# Patient Record
Sex: MALE | Race: WHITE | NOT HISPANIC OR LATINO | Employment: FULL TIME | URBAN - METROPOLITAN AREA
[De-identification: names, ages, dates, MRNs, and addresses within clinical notes are randomized per-mention and may not be internally consistent; named-entity substitution may affect disease eponyms.]

---

## 2017-01-13 ENCOUNTER — OFFICE VISIT (OUTPATIENT)
Dept: ENDOCRINOLOGY | Facility: CLINIC | Age: 37
End: 2017-01-13

## 2017-01-13 VITALS
WEIGHT: 145 LBS | HEART RATE: 63 BPM | SYSTOLIC BLOOD PRESSURE: 132 MMHG | HEIGHT: 70 IN | BODY MASS INDEX: 20.76 KG/M2 | DIASTOLIC BLOOD PRESSURE: 80 MMHG

## 2017-01-13 DIAGNOSIS — Z23 NEED FOR PROPHYLACTIC VACCINATION AND INOCULATION AGAINST INFLUENZA: ICD-10-CM

## 2017-01-13 DIAGNOSIS — E10.9 CONTROLLED DIABETES MELLITUS TYPE 1 WITHOUT COMPLICATIONS (H): Primary | ICD-10-CM

## 2017-01-13 DIAGNOSIS — E10.9 CONTROLLED DIABETES MELLITUS TYPE 1 WITHOUT COMPLICATIONS (H): ICD-10-CM

## 2017-01-13 LAB
CHOLEST SERPL-MCNC: 135 MG/DL
CREAT SERPL-MCNC: 0.64 MG/DL (ref 0.66–1.25)
CREAT UR-MCNC: 23 MG/DL
GFR SERPL CREATININE-BSD FRML MDRD: ABNORMAL ML/MIN/1.7M2
HBA1C MFR BLD: 6.2 % (ref 4.3–6)
HDLC SERPL-MCNC: 64 MG/DL
LDLC SERPL CALC-MCNC: 63 MG/DL
MICROALBUMIN UR-MCNC: <5 MG/L
MICROALBUMIN/CREAT UR: NORMAL MG/G CR (ref 0–17)
NONHDLC SERPL-MCNC: 70 MG/DL
POTASSIUM SERPL-SCNC: 4.2 MMOL/L (ref 3.4–5.3)
TRIGL SERPL-MCNC: 38 MG/DL
TSH SERPL DL<=0.05 MIU/L-ACNC: 0.64 MU/L (ref 0.4–4)

## 2017-01-13 RX ORDER — LISINOPRIL 10 MG/1
10 TABLET ORAL DAILY
Qty: 90 TABLET | Refills: 3 | Status: SHIPPED | OUTPATIENT
Start: 2017-01-13 | End: 2018-01-19

## 2017-01-13 ASSESSMENT — PAIN SCALES - GENERAL: PAINLEVEL: NO PAIN (0)

## 2017-01-13 NOTE — PROGRESS NOTES
Quick Note:    The results which are within normal limits. Will continue with plan as discussed. Pt informed.    Bushra Rosa MD  1/13/2017  5:46 PM    ______

## 2017-01-13 NOTE — Clinical Note
Patient:  Joseph O Thoen  :   1980  MRN:     [id number]        Mr.Joseph O Thoen  [address] [facility] [address]        2017    Dear Yung    Here is your urine which is negative for protein. This is great news.    If you have any questions, please feel free to contact my nurse at [phone] select option #3 for triage nurse  or  option #1 for scheduling related questions.    Regards    Bushra Rosa MD        Resulted Orders   Albumin Random Urine Quantitative   Result Value Ref Range    Creatinine Urine 23 mg/dL    Albumin Urine mg/L <5 mg/L    Albumin Urine mg/g Cr Unable to calculate due to low value 0 - 17 mg/g Cr   Hemoglobin A1c POCT   Result Value Ref Range    Hemoglobin A1C 6.2 (A) 4.3 - 6.0 %       Bushra Rosa MD

## 2017-01-13 NOTE — PROGRESS NOTES
Injectable Influenza Immunization Documentation    1.  Is the person to be vaccinated sick today?  No    2. Does the person to be vaccinated have an allergy to eggs or to a component of the vaccine?  No    3. Has the person to be vaccinated today ever had a serious reaction to influenza vaccine in the past?  No    4. Has the person to be vaccinated ever had Guillain-Parker syndrome?  No     Form completed by shelly hogue

## 2017-01-13 NOTE — Clinical Note
Patient:  Joseph O Thoen  :   1980  MRN:     [id number]        Mr.Joseph O Thoen  [address] [facility] [address]        2017    Dear Jose    Here are your results which are within normal limits.  Let's continue with the plan as discussed.    If you have any questions, please feel free to contact my nurse at [phone] select option #3 for triage nurse  or  option #1 for scheduling related questions.    Regards    Bushra Rosa MD      Resulted Orders   Creatinine   Result Value Ref Range    Creatinine 0.64 (L) 0.66 - 1.25 mg/dL    GFR Estimate >90  Non  GFR Calc   >60 mL/min/1.7m2    GFR Estimate If Black >90   GFR Calc   >60 mL/min/1.7m2   Lipid Profile   Result Value Ref Range    Cholesterol 135 <200 mg/dL    Triglycerides 38 <150 mg/dL    HDL Cholesterol 64 >39 mg/dL    LDL Cholesterol Calculated 63 <100 mg/dL      Comment:      Desirable:       <100 mg/dl    Non HDL Cholesterol 70 <130 mg/dL   TSH   Result Value Ref Range    TSH 0.64 0.40 - 4.00 mU/L   Tissue transglutaminase rylee IgA and IgG   Result Value Ref Range    Tissue Transglutaminase Antibody IgA 1 <7 U/mL      Comment:      Negative    Tissue Transglutaminase Rylee IgG <1  Negative   <7 U/mL   Potassium   Result Value Ref Range    Potassium 4.2 3.4 - 5.3 mmol/L       Lab

## 2017-01-13 NOTE — NURSING NOTE
Chief Complaint   Patient presents with     RECHECK     F/U TYPE I DM     Gisele Pablo, [facility]  Endocrinology & Diabetes 3G

## 2017-01-13 NOTE — Clinical Note
1/13/2017       RE: Joseph O Thoen  [address] [facility] [address]     Dear Colleague,    Thank you for referring your patient, Joseph O Thoen, to the [facility] ENDOCRINOLOGY at [facility]. Please see a copy of my visit note below.    Endocrinology Progress Note  CC: Diabetes care    HPI:   #1 Diabetes - likely Type 1  The patient was initially seen in May of 2011. He had a 30 pound weight loss with polyuria, polydipsia, and serum glucose of 466. His May 2011 hemoglobin A1c was 13.5. His anion gap of 17. His C-peptide was 0.8 with a glucose of 318. His TSH was normal. His urine was negative for microalbumin. His DEMARCO antibodies were negative. He was initially tried on metformin but if his sugars do not improve over the course of 4 days, he was then started on Lantus and Novolog. His evaluation in September of 2011 was significant for negative anti-islet and anti-insulin antibodies. His August 2011 hemoglobin A1c was 5.8.September 2011 hemoglobin A1c of 5.2. March 2012 hemoglobin A1c of 4.9. In July 2012, the patient started a Medtronic Paradigm pump. The patient is generally very satisfied with his pump. August 2013 evaluation showed low C-peptide at 0.2, concurrent glucose of 80, and negative DEMARCO Antibodies. January 2014 hemoglobin A1c of 6.1. July 2014 hemoglobin A1C is 6.2. In 2014, the patient added CGMS to his pump. January 2015 hemoglobin A1c of 5.9. The patient has met with diabetes education and has adjusted his CGMS.  July 2015 hemoglobin A1c of 6.2.    Interval history since last visit:   Patient reports doing well.  January 2017 hemoglobin A1c of 6.2 reviewing his CGM S, he tends to have hyperglycemia post breakfast as well as post supper (in the 160s).  His blood sugars appear to be fairly stable overnight.  He reports tolerating his insulin pump and sensor.      Pump program:  Basal rate:  Midnight to 10 AM at 0.55 units per hour  10 AM to noon at 0.575  "units per hour  Noon to 2 PM at 0.55 units per hour  2 PM to 6 PM at 0.575 units per hour  6 PM to midnight at 0.6 units per hour    Bolus:  9 PM to 6 AM at one unit per 20 g carbohydrate, 6 AM to 9 PM at one unit per 15 g carbohydrate.  Insulin sensitivity: 9:30 PM to 6 AM at one unit per 60 mg/dL above goal  Active insulin time 4 hours    ROS related to diabetes   CV: none   Neuro:none   Neph: Neg in July 2015, patient on lisinopril 10 mg daily  Eye: eye exam negative in January 2016  Infections: patient denies   Dental: pending   Immunizations: pneumonia shot in 2011.  Flu shot in 2017  Lantus prescription given in case of pump failure in July 2015  Glucagon prescription given in January 2016    ROS: As stated in HPI, otherwise negative.    PMH: No history of hospitalizations, surgeries.     Medications:   Current Outpatient Prescriptions   Medication Sig Dispense Refill     insulin glargine (LANTUS) 100 UNIT/ML injection Inject 13 Units Subcutaneous At Bedtime To provide basal insulin If pump fails 3 mL 0     insulin aspart (NOVOLOG VIAL) 100 UNITS/ML injection Pt uses about 40 units per day in the pump 40 mL 3     lisinopril (PRINIVIL/ZESTRIL) 10 MG tablet Take 1 tablet (10 mg) by mouth daily 90 tablet 3     blood glucose (STAS CONTOUR) test strip Patient is transitioning to insulin pump.  Needs these test strips because this is the only meter that works with his pump.  Will need to test 6 times daily 200 strip prn     MICROLET LANCETS MISC 1 each 6 times daily 200 each 11     acetone, Urine, test STRP 1 each by In Vitro route as needed. 50 strip 3     insulin syringe 31G X 5/16\" 0.3 ML MISC 1 each as needed. 100 each 3     Insulin Pen Needle (BD PEN NEEDLE KOKI U/F) 32G X 4 MM MISC Use 4  daily or as directed. 200 each 3     Blood Pressure Monitor KIT Take bp 2 times a week or PRN problems.  Dx DM 1 kit 0     Insulin Pen Needle (PEN NEEDLES) 31G X 8 MM MISC Use as directed       [DISCONTINUED] lisinopril " "(PRINIVIL/ZESTRIL) 10 MG tablet Take 1 tablet (10 mg) by mouth daily 90 tablet 2     [DISCONTINUED] insulin aspart (NOVOLOG VIAL) 100 UNITS/ML VIAL Pt uses about 30 units per day in the pump 30 mL 3     [DISCONTINUED] insulin glargine (LANTUS) 100 UNIT/ML PEN Inject 13 Units Subcutaneous At Bedtime To provide basal insulin If pump fails 3.9 mL 0       Family History:   Thyroid disease, negative for diabetes    Physical Exam:  Vital signs:   /80 mmHg  Pulse 63  Estimated body mass index is 21.09 kg/(m^2) as calculated from the following:    Height as of 8/8/16: 1.778 m (5' 10\").    Weight as of 8/8/16: 66.679 kg (147 lb).  GENERAL APPEARANCE: Alert and no distress  NECK: No lymphadenopathy appreciated  Thyroid: No obvious nodules palpated   CV: RRR without M/R/G  Lungs: CTA bilaterally  Abdomen: Soft, Nontender, non distended, positive bowel sounds   Skin: No infection in feet , intact peripheral pulses  Mood: Normal   Lymph: neg in neck and supraclavicular area    No visits with results within 1 Week(s) from this visit.  Latest known visit with results is:    Orders Only on 01/13/2017   Component Date Value Ref Range Status     Creatinine 01/13/2017 0.64* 0.66 - 1.25 mg/dL Final     GFR Estimate 01/13/2017   >60 mL/min/1.7m2 Final                    Value:>90  Non  GFR Calc       GFR Estimate If Black 01/13/2017   >60 mL/min/1.7m2 Final                    Value:>90   GFR Calc       Cholesterol 01/13/2017 135  <200 mg/dL Final     Triglycerides 01/13/2017 38  <150 mg/dL Final     HDL Cholesterol 01/13/2017 64  >39 mg/dL Final     LDL Cholesterol Calculated 01/13/2017 63  <100 mg/dL Final    Desirable:       <100 mg/dl     Non HDL Cholesterol 01/13/2017 70  <130 mg/dL Final     TSH 01/13/2017 0.64  0.40 - 4.00 mU/L Final     Potassium 01/13/2017 4.2  3.4 - 5.3 mmol/L Final   Office Visit on 01/13/2017   Component Date Value Ref Range Status     Creatinine Urine 01/13/2017 23   " Final     Albumin Urine mg/L 01/13/2017 <5   Final     Albumin Urine mg/g Cr 01/13/2017 Unable to calculate due to low value  0 - 17 mg/g Cr Final     Hemoglobin A1C 01/13/2017 6.2* 4.3 - 6.0 % Final           Assessment/Plan    1) Diabetes Mellitus -likely type 1  Patient continues to do well in terms of glycemic control given his recent hemoglobin A1c 1.6.I am uncertain whether the patient has type I or type 2 diabetes, but the evidence for type 1 diabetes include low BMI of 21, and a low C-peptide of 0.8 with glucose of 318 and hx of previous anion gap 17. His C-peptide was low at 0.2 (concurrent glucose of 80) and his DEMARCO antibodies continue to remain negative as of August 2013.    The patient continues with this Medtronic pump with sensor.  Given his hyperglycemia post breakfast and after supper, I will increase his prandial coverage with breakfast and supper (new program at one unit per 12 g carbohydrate) and slightly increase his prandial coverage with lunch (one unit per 14 g carbohydrate)    Basal rate:  11 PM to 10 AM at 0.55 units per hour  10 AM to noon at 0.575 units per hour  Noon to 2 PM at 0.55 units per hour  2 PM to 6 PM at 0.575 units per hour  6 PM to 11 PM at 0.625 units per hour    Bolus:  9 PM total 6 AM at one unit per 20 g carbohydrate  6 AM to 11 AM at one unit per 12 g carbohydrate  11 AM to 5:30 PM at one unit per 14 g carbohydrate  5:30 PM to 9 PM at one unit per 12 g carbohydrate    1 unit per 50 above goal of 100 during the day, and 1 unit per 60 above goal 120 from 9 PM to 6 AM    Prescription given for Lantus in case of pump failure.  Referral made for ophthalmology.  Flu shot given today.  Refills for NovoLog and lisinopril sent to pharmacy.    Patient interest in the possibility of the closed Loop Medtronic pump when it becomes available.  We will talk at return visit.    #2 history of hypertension  July 2015 potassium and creatinine normal.  Blood pressure today was good.  Patient  continues on lisinopril.  Will check potassium and creatinine today    25 minutes spent with patient of which 20 minutes was spent in face-to-face discussion coordination of care    Return visit planned in 6 months, sooner if needed.        Injectable Influenza Immunization Documentation    1.  Is the person to be vaccinated sick today?  No    2. Does the person to be vaccinated have an allergy to eggs or to a component of the vaccine?  No    3. Has the person to be vaccinated today ever had a serious reaction to influenza vaccine in the past?  No    4. Has the person to be vaccinated ever had Guillain-Walters syndrome?  No     Form completed by shelly hogue        Again, thank you for allowing me to participate in the care of your patient.      Sincerely,    Bushra Rosa MD

## 2017-01-13 NOTE — MR AVS SNAPSHOT
After Visit Summary   1/13/2017    Joseph O Thoen    MRN: [id number]           Patient Information     Date Of Birth          1980        Visit Information        Provider Department      1/13/2017 12:00 PM Bushra Rosa MD  [facility]        Today's Diagnoses     Controlled diabetes mellitus type 1 without complications (H)    -  1       Care Instructions    Read about medtronic closed loop pump - approved by FDA - not yet out - promising        Follow-ups after your visit        Additional Services     OPHTHALMOLOGY ADULT REFERRAL       Pt needs eye exam given hx of diabetes                  Follow-up notes from your care team     Return in about 6 months (around 7/13/2017).      Your next 10 appointments already scheduled     Jan 13, 2017  1:30 PM   LAB with  LAB   [facility]    [address] [place] [address]  [address] [facility] [address] [phone]           Patient must bring picture ID.  Patient should be prepared to give a urine specimen  Please do not eat 10-12 hours before your appointment if you are coming in fasting for labs on lipids, cholesterol, or glucose (sugar).  Pregnant women should follow their Care Team instructions. Water with medications is okay. Do not drink coffee or other fluids.   If you have concerns about taking  your medications, please ask at office or if scheduling via [employer], send a message by clicking on Secure Messaging, Message Your Care Team.            Jan 17, 2017  9:00 AM   (Arrive by 8:45 AM)   NEW GENERAL with Brodie Cheek OD   [facility]    [address] [place] [address]  4th Floor  [facility] [address] [phone]            Jul 14, 2017  8:30 AM   (Arrive by 8:15 AM)   RETURN DIABETES with Bushra Rosa MD   [facility]    [address] [place] [address]  3rd Floor  [facility] [address]  "  [phone]              Future tests that were ordered for you today     Open Future Orders        Priority Expected Expires Ordered    Creatinine Routine  1/13/2018 1/13/2017    Lipid Profile Routine  1/13/2018 1/13/2017    TSH Routine  1/13/2018 1/13/2017    Tissue transglutaminase rylee IgA and IgG Routine  1/13/2018 1/13/2017    Potassium Routine  1/13/2018 1/13/2017            Who to contact     Please call your clinic at [phone] to:    Ask questions about your health    Make or cancel appointments    Discuss your medicines    Learn about your test results    Speak to your doctor   If you have compliments or concerns about an experience at your clinic, or if you wish to file a complaint, please contact [facility] Patient Relations at [phone] or email us at [email or web address]         Additional Information About Your Visit        [employer] Information     [employer] gives you secure access to your electronic health record. If you see a primary care provider, you can also send messages to your care team and make appointments. If you have questions, please call your primary care clinic.  If you do not have a primary care provider, please call [phone] and they will assist you.      [employer] is an electronic gateway that provides easy, online access to your medical records. With [employer], you can request a clinic appointment, read your test results, renew a prescription or communicate with your care team.     To access your existing account, please contact your [facility] or call [phone] for assistance.        Care EveryWhere ID     This is your Care EveryWhere ID. This could be used by other organizations to access your [facility] medical records  [id number]        Your Vitals Were     Pulse Height BMI (Body Mass Index)             63 1.778 m (5' 10\") 20.81 kg/m2          Blood Pressure from Last 3 Encounters:   01/13/17 132/80 "   08/08/16 120/74   01/29/16 130/81    Weight from Last 3 Encounters:   01/13/17 65.772 kg (145 lb)   08/08/16 66.679 kg (147 lb)   01/29/16 66.225 kg (146 lb)              We Performed the Following     Albumin Random Urine Quantitative     OPHTHALMOLOGY ADULT REFERRAL          Today's Medication Changes          These changes are accurate as of: 1/13/17  1:16 PM.  If you have any questions, ask your nurse or doctor.               These medicines have changed or have updated prescriptions.        Dose/Directions    insulin aspart 100 UNITS/ML injection   Commonly known as:  NovoLOG VIAL   This may have changed:  additional instructions   Used for:  Controlled diabetes mellitus type 1 without complications (H)   Changed by:  Bushra Rosa MD        Pt uses about 40 units per day in the pump   Quantity:  40 mL   Refills:  3            Where to get your medicines      These medications were sent to [place], [place] - [address] [place] [address] [address] [address]    Hours:  TRANSPLANT PHONE NUMBER [phone] Phone:  [phone]    - insulin aspart 100 UNITS/ML injection  - lisinopril 10 MG tablet      Some of these will need a paper prescription and others can be bought over the counter.  Ask your nurse if you have questions.     Bring a paper prescription for each of these medications    - insulin glargine 100 UNIT/ML injection             Primary Care Provider    Doctor Unknown, MD       No address on file        Thank you!     Thank you for choosing [facility] ENDOCRINOLOGY  for your care. Our goal is always to provide you with excellent care. Hearing back from our patients is one way we can continue to improve our services. Please take a few minutes to complete the written survey that you may receive in the mail after your visit with us. Thank you!             Your Updated Medication List - Protect others around you: Learn how to safely use,  "store and throw away your medicines at www.disposemymeds.org.          This list is accurate as of: 1/13/17  1:16 PM.  Always use your most recent med list.                   Brand Name Dispense Instructions for use    acetone (Urine) test Strp     50 strip    1 each by In Vitro route as needed.       blood glucose monitoring test strip    STAS CONTOUR    200 strip    Patient is transitioning to insulin pump.  Needs these test strips because this is the only meter that works with his pump.  Will need to test 6 times daily       Blood Pressure Monitor Kit     1 kit    Take bp 2 times a week or PRN problems.  Dx DM       insulin aspart 100 UNITS/ML injection    NovoLOG VIAL    40 mL    Pt uses about 40 units per day in the pump       insulin glargine 100 UNIT/ML injection    LANTUS    3 mL    Inject 13 Units Subcutaneous At Bedtime To provide basal insulin If pump fails       insulin syringe 31G X 5/16\" 0.3 ML Misc     100 each    1 each as needed.       lisinopril 10 MG tablet    PRINIVIL/ZESTRIL    90 tablet    Take 1 tablet (10 mg) by mouth daily       MICROLET LANCETS Misc     200 each    1 each 6 times daily       * BD KOKI U/F 32G X 4 MM   Generic drug:  insulin pen needle     200 each    Use 4  daily or as directed.       * Pen Needles 31G X 8 MM Misc      Use as directed       * Notice:  This list has 2 medication(s) that are the same as other medications prescribed for you. Read the directions carefully, and ask your doctor or other care provider to review them with you.      "

## 2017-01-13 NOTE — PROGRESS NOTES
Endocrinology Progress Note  CC: Diabetes care    HPI:   #1 Diabetes - likely Type 1  The patient was initially seen in May of 2011. He had a 30 pound weight loss with polyuria, polydipsia, and serum glucose of 466. His May 2011 hemoglobin A1c was 13.5. His anion gap of 17. His C-peptide was 0.8 with a glucose of 318. His TSH was normal. His urine was negative for microalbumin. His DEMARCO antibodies were negative. He was initially tried on metformin but if his sugars do not improve over the course of 4 days, he was then started on Lantus and Novolog. His evaluation in September of 2011 was significant for negative anti-islet and anti-insulin antibodies. His August 2011 hemoglobin A1c was 5.8.September 2011 hemoglobin A1c of 5.2. March 2012 hemoglobin A1c of 4.9. In July 2012, the patient started a Medtronic Paradigm pump. The patient is generally very satisfied with his pump. August 2013 evaluation showed low C-peptide at 0.2, concurrent glucose of 80, and negative DEMARCO Antibodies. January 2014 hemoglobin A1c of 6.1. July 2014 hemoglobin A1C is 6.2. In 2014, the patient added CGMS to his pump. January 2015 hemoglobin A1c of 5.9. The patient has met with diabetes education and has adjusted his CGMS.  July 2015 hemoglobin A1c of 6.2.    Interval history since last visit:   Patient reports doing well.  January 2017 hemoglobin A1c of 6.2 reviewing his CGM S, he tends to have hyperglycemia post breakfast as well as post supper (in the 160s).  His blood sugars appear to be fairly stable overnight.  He reports tolerating his insulin pump and sensor.      Pump program:  Basal rate:  Midnight to 10 AM at 0.55 units per hour  10 AM to noon at 0.575 units per hour  Noon to 2 PM at 0.55 units per hour  2 PM to 6 PM at 0.575 units per hour  6 PM to midnight at 0.6 units per hour    Bolus:  9 PM to 6 AM at one unit per 20 g carbohydrate, 6 AM to 9 PM at one unit per 15 g carbohydrate.  Insulin sensitivity: 9:30 PM to 6 AM at one unit  "per 60 mg/dL above goal  Active insulin time 4 hours    ROS related to diabetes   CV: none   Neuro:none   Neph: Neg in July 2015, patient on lisinopril 10 mg daily  Eye: eye exam negative in January 2016  Infections: patient denies   Dental: pending   Immunizations: pneumonia shot in 2011.  Flu shot in 2017  Lantus prescription given in case of pump failure in July 2015  Glucagon prescription given in January 2016    ROS: As stated in HPI, otherwise negative.    PMH: No history of hospitalizations, surgeries.     Medications:   Current Outpatient Prescriptions   Medication Sig Dispense Refill     insulin glargine (LANTUS) 100 UNIT/ML injection Inject 13 Units Subcutaneous At Bedtime To provide basal insulin If pump fails 3 mL 0     insulin aspart (NOVOLOG VIAL) 100 UNITS/ML injection Pt uses about 40 units per day in the pump 40 mL 3     lisinopril (PRINIVIL/ZESTRIL) 10 MG tablet Take 1 tablet (10 mg) by mouth daily 90 tablet 3     blood glucose (STAS CONTOUR) test strip Patient is transitioning to insulin pump.  Needs these test strips because this is the only meter that works with his pump.  Will need to test 6 times daily 200 strip prn     MICROLET LANCETS MISC 1 each 6 times daily 200 each 11     acetone, Urine, test STRP 1 each by In Vitro route as needed. 50 strip 3     insulin syringe 31G X 5/16\" 0.3 ML MISC 1 each as needed. 100 each 3     Insulin Pen Needle (BD PEN NEEDLE KOKI U/F) 32G X 4 MM MISC Use 4  daily or as directed. 200 each 3     Blood Pressure Monitor KIT Take bp 2 times a week or PRN problems.  Dx DM 1 kit 0     Insulin Pen Needle (PEN NEEDLES) 31G X 8 MM MISC Use as directed       [DISCONTINUED] lisinopril (PRINIVIL/ZESTRIL) 10 MG tablet Take 1 tablet (10 mg) by mouth daily 90 tablet 2     [DISCONTINUED] insulin aspart (NOVOLOG VIAL) 100 UNITS/ML VIAL Pt uses about 30 units per day in the pump 30 mL 3     [DISCONTINUED] insulin glargine (LANTUS) 100 UNIT/ML PEN Inject 13 Units Subcutaneous At " "Bedtime To provide basal insulin If pump fails 3.9 mL 0       Family History:   Thyroid disease, negative for diabetes    Physical Exam:  Vital signs:   /80 mmHg  Pulse 63  Estimated body mass index is 21.09 kg/(m^2) as calculated from the following:    Height as of 8/8/16: 1.778 m (5' 10\").    Weight as of 8/8/16: 66.679 kg (147 lb).  GENERAL APPEARANCE: Alert and no distress  NECK: No lymphadenopathy appreciated  Thyroid: No obvious nodules palpated   CV: RRR without M/R/G  Lungs: CTA bilaterally  Abdomen: Soft, Nontender, non distended, positive bowel sounds   Skin: No infection in feet , intact peripheral pulses  Mood: Normal   Lymph: neg in neck and supraclavicular area    No visits with results within 1 Week(s) from this visit.  Latest known visit with results is:    Orders Only on 01/13/2017   Component Date Value Ref Range Status     Creatinine 01/13/2017 0.64* 0.66 - 1.25 mg/dL Final     GFR Estimate 01/13/2017   >60 mL/min/1.7m2 Final                    Value:>90  Non  GFR Calc       GFR Estimate If Black 01/13/2017   >60 mL/min/1.7m2 Final                    Value:>90   GFR Calc       Cholesterol 01/13/2017 135  <200 mg/dL Final     Triglycerides 01/13/2017 38  <150 mg/dL Final     HDL Cholesterol 01/13/2017 64  >39 mg/dL Final     LDL Cholesterol Calculated 01/13/2017 63  <100 mg/dL Final    Desirable:       <100 mg/dl     Non HDL Cholesterol 01/13/2017 70  <130 mg/dL Final     TSH 01/13/2017 0.64  0.40 - 4.00 mU/L Final     Potassium 01/13/2017 4.2  3.4 - 5.3 mmol/L Final   Office Visit on 01/13/2017   Component Date Value Ref Range Status     Creatinine Urine 01/13/2017 23   Final     Albumin Urine mg/L 01/13/2017 <5   Final     Albumin Urine mg/g Cr 01/13/2017 Unable to calculate due to low value  0 - 17 mg/g Cr Final     Hemoglobin A1C 01/13/2017 6.2* 4.3 - 6.0 % Final           Assessment/Plan    1) Diabetes Mellitus -likely type 1  Patient continues to do well " in terms of glycemic control given his recent hemoglobin A1c 1.6.I am uncertain whether the patient has type I or type 2 diabetes, but the evidence for type 1 diabetes include low BMI of 21, and a low C-peptide of 0.8 with glucose of 318 and hx of previous anion gap 17. His C-peptide was low at 0.2 (concurrent glucose of 80) and his DEMARCO antibodies continue to remain negative as of August 2013.    The patient continues with this Medtronic pump with sensor.  Given his hyperglycemia post breakfast and after supper, I will increase his prandial coverage with breakfast and supper (new program at one unit per 12 g carbohydrate) and slightly increase his prandial coverage with lunch (one unit per 14 g carbohydrate)    Basal rate:  11 PM to 10 AM at 0.55 units per hour  10 AM to noon at 0.575 units per hour  Noon to 2 PM at 0.55 units per hour  2 PM to 6 PM at 0.575 units per hour  6 PM to 11 PM at 0.625 units per hour    Bolus:  9 PM total 6 AM at one unit per 20 g carbohydrate  6 AM to 11 AM at one unit per 12 g carbohydrate  11 AM to 5:30 PM at one unit per 14 g carbohydrate  5:30 PM to 9 PM at one unit per 12 g carbohydrate    1 unit per 50 above goal of 100 during the day, and 1 unit per 60 above goal 120 from 9 PM to 6 AM    Prescription given for Lantus in case of pump failure.  Referral made for ophthalmology.  Flu shot given today.  Refills for NovoLog and lisinopril sent to pharmacy.    Patient interest in the possibility of the closed Loop Medtronic pump when it becomes available.  We will talk at return visit.    #2 history of hypertension  July 2015 potassium and creatinine normal.  Blood pressure today was good.  Patient continues on lisinopril.  Will check potassium and creatinine today    25 minutes spent with patient of which 20 minutes was spent in face-to-face discussion coordination of care    Return visit planned in 6 months, sooner if needed.

## 2017-01-17 ENCOUNTER — OFFICE VISIT (OUTPATIENT)
Dept: OPHTHALMOLOGY | Facility: CLINIC | Age: 37
End: 2017-01-17

## 2017-01-17 DIAGNOSIS — H02.889 MEIBOMIAN GLAND DYSFUNCTION: ICD-10-CM

## 2017-01-17 DIAGNOSIS — H52.13 MYOPIA, BILATERAL: ICD-10-CM

## 2017-01-17 DIAGNOSIS — E10.9 TYPE 1 DIABETES MELLITUS WITHOUT RETINOPATHY (H): Primary | ICD-10-CM

## 2017-01-17 LAB
TTG IGA SER-ACNC: 1 U/ML
TTG IGG SER-ACNC: NORMAL U/ML

## 2017-01-17 ASSESSMENT — EXTERNAL EXAM - RIGHT EYE: OD_EXAM: NORMAL

## 2017-01-17 ASSESSMENT — CUP TO DISC RATIO
OS_RATIO: 0.2
OD_RATIO: 0.2

## 2017-01-17 ASSESSMENT — SLIT LAMP EXAM - LIDS
COMMENTS: COLLARETTES
COMMENTS: COLLARETTES

## 2017-01-17 ASSESSMENT — VISUAL ACUITY
METHOD: SNELLEN - LINEAR
OD_SC: J1+
OD_PH_SC: 20/20
OD_SC: 20/40
OS_SC+: -1
OS_SC: 20/30
OS_SC: J1+
OD_SC+: -1

## 2017-01-17 ASSESSMENT — CONF VISUAL FIELD
OS_NORMAL: 1
METHOD: COUNTING FINGERS
OD_NORMAL: 1

## 2017-01-17 ASSESSMENT — REFRACTION_MANIFEST
OD_SPHERE: -0.50
OS_CYLINDER: SPHERE
OD_CYLINDER: SPHERE
OS_SPHERE: -0.50

## 2017-01-17 ASSESSMENT — EXTERNAL EXAM - LEFT EYE: OS_EXAM: NORMAL

## 2017-01-17 ASSESSMENT — TONOMETRY
IOP_METHOD: ICARE
OS_IOP_MMHG: 16
OD_IOP_MMHG: 16

## 2017-01-17 NOTE — NURSING NOTE
Chief Complaints and History of Present Illnesses   Patient presents with     Diabetic Eye Exam     HPI    Affected eye(s):  Both   Symptoms:     Blurred vision (Comment: distance is somewhat blurred, doesnt wear gls)   No floaters   No flashes   No tearing   No Dryness   No itching   No burning         Do you have eye pain now?:  No      Comments:    DM type 1 BS: 130 this morning  A1c 6.2 01/13/17  A1C      5.9   8/28/2013  A1C      6.1   1/18/2013  A1C      5.5   9/14/2012  A1C      4.9   3/2/2012  A1C      5.2   11/3/2011    POH: no dx, injuries, sx, or lasers  Fhx: none  Mhx: DMI

## 2017-01-18 NOTE — PROGRESS NOTES
Quick Note:    Dear Yung    Here is your urine which is negative for protein. This is great news.    If you have any questions, please feel free to contact my nurse at [phone] select option #3 for triage nurse or option #1 for scheduling related questions.    Regards    Bushra Rosa MD  ______

## 2017-01-18 NOTE — PROGRESS NOTES
Quick Note:    The results which are within normal limits. Will continue with plan as discussed. Pt informed.    Bushra Rosa MD  1/18/2017  3:11 PM    ______

## 2017-01-31 ENCOUNTER — OFFICE VISIT (OUTPATIENT)
Dept: INTERNAL MEDICINE | Facility: CLINIC | Age: 37
End: 2017-01-31

## 2017-01-31 VITALS
DIASTOLIC BLOOD PRESSURE: 84 MMHG | HEART RATE: 66 BPM | WEIGHT: 143.7 LBS | HEIGHT: 70 IN | SYSTOLIC BLOOD PRESSURE: 131 MMHG | BODY MASS INDEX: 20.57 KG/M2 | OXYGEN SATURATION: 100 % | RESPIRATION RATE: 20 BRPM

## 2017-01-31 DIAGNOSIS — R22.42 HIP REGION MASS, LEFT: Primary | ICD-10-CM

## 2017-01-31 DIAGNOSIS — E10.9 CONTROLLED DIABETES MELLITUS TYPE 1 WITHOUT COMPLICATIONS (H): ICD-10-CM

## 2017-01-31 ASSESSMENT — PAIN SCALES - GENERAL: PAINLEVEL: NO PAIN (0)

## 2017-01-31 NOTE — PATIENT INSTRUCTIONS
[facility] Medication Refill Request Information:  * Please contact your pharmacy regarding ANY request for medication refills.  ** [facility] Prescription Fax = [phone]  * Please allow 3 business days for routine medication refills.  * Please allow 5 business days for controlled substance medication refills.     [facility] Test Result notification information:  *You will be notified with in 7-10 days of your appointment day regarding the results of your test.  If you are on MyChart you will be notified as soon as the provider has reviewed the results and signed off on them.  Sebaceous Cyst [No Infection]  A sebaceous cyst is a term that most commonly refers to 2 types of cysts:  epidermoid  (from skin), and  pilar  (from hair follicles). A few things about these cysts:    A cyst is a sac filled with material that is often cheesy, fatty, oily, or fibrous material. The material in them can be thick (like cottage cheese) or liquid.    They form slowly under the skin, and can be found on most parts of the body. They are most often found in hairier areas like the scalp, face, upper back, and genitals.    You can usually move them slightly if you try.    They can be smaller than a pea or as large as a couple of inches.    They are usually not painful, unless they become inflamed or infected.    The area around the cyst may smell bad, and if the cyst breaks open, the material inside it often smells bad as well.  Causes  Sebaceous cysts are often caused when skin (epidermal) cells move under the skin surface, or are covered over by it, but continue to multiply, like skin does normally. They then form a wall around themselves (cyst) and secrete normal skin fluids (keratin). This can happen for developmental reason, but is often from skin trauma.  Cysts are often found around hair follicles, which in a sense are like cysts, but with openings, through which normal lubricating oils for your hair are excreted.  The opening can become blocked or the site inflamed, causing a cyst. This often occurs when there is damage to the hair follicles by an abrasion or wound.  Symptoms  The following are symptoms of a sebaceous cyst:    Feeling a lump just beneath the skin.    It may or may not be painful.    The cyst may or may not smell bad.    The cyst may become inflamed or red.    The cyst may leak fluid or thick material.  Home care  Sebaceous cysts often go away without any treatment. If your sebaceous cyst doesn't, and is bothersome, it may be drained or removed. If the cyst drains on its own, it may return. Resist the temptation to squeeze, pop, stick a needle in it, or cut it open. This often leads to an infection and scarring. If it gets severely inflamed or infected, you should seek medical treatment. Be sure to clean the cyst area when bathing or showering. Watch for the signs of infection listed below.  Follow-up care  Follow up with your doctor or as advised by our staff.  When to seek medical care  Get prompt medical attention if any of the following occur:    Swelling, redness, or pain    Pus coming from the cyst    8597-9683 [employer]. [address] [place] [address], [place] [address]. All rights reserved. This information is not intended as a substitute for professional medical care. Always follow your healthcare professional's instructions.

## 2017-01-31 NOTE — MR AVS SNAPSHOT
After Visit Summary   1/31/2017    Joseph O Thoen    MRN: [id number]           Patient Information     Date Of Birth          1980        Visit Information        Provider Department      1/31/2017 7:35 AM Neo Owens MD [facility]        Today's Diagnoses     Hip region mass, left    -  1       Care Instructions    [facility] Center Medication Refill Request Information:  * Please contact your pharmacy regarding ANY request for medication refills.  ** [facility] Prescription Fax = [phone]  * Please allow 3 business days for routine medication refills.  * Please allow 5 business days for controlled substance medication refills.     [facility] Test Result notification information:  *You will be notified with in 7-10 days of your appointment day regarding the results of your test.  If you are on MyChart you will be notified as soon as the provider has reviewed the results and signed off on them.  Sebaceous Cyst [No Infection]  A sebaceous cyst is a term that most commonly refers to 2 types of cysts:  epidermoid  (from skin), and  pilar  (from hair follicles). A few things about these cysts:    A cyst is a sac filled with material that is often cheesy, fatty, oily, or fibrous material. The material in them can be thick (like cottage cheese) or liquid.    They form slowly under the skin, and can be found on most parts of the body. They are most often found in hairier areas like the scalp, face, upper back, and genitals.    You can usually move them slightly if you try.    They can be smaller than a pea or as large as a couple of inches.    They are usually not painful, unless they become inflamed or infected.    The area around the cyst may smell bad, and if the cyst breaks open, the material inside it often smells bad as well.  Causes  Sebaceous cysts are often caused when skin (epidermal) cells move under the skin surface, or are covered over by it, but continue  to multiply, like skin does normally. They then form a wall around themselves (cyst) and secrete normal skin fluids (keratin). This can happen for developmental reason, but is often from skin trauma.  Cysts are often found around hair follicles, which in a sense are like cysts, but with openings, through which normal lubricating oils for your hair are excreted. The opening can become blocked or the site inflamed, causing a cyst. This often occurs when there is damage to the hair follicles by an abrasion or wound.  Symptoms  The following are symptoms of a sebaceous cyst:    Feeling a lump just beneath the skin.    It may or may not be painful.    The cyst may or may not smell bad.    The cyst may become inflamed or red.    The cyst may leak fluid or thick material.  Home care  Sebaceous cysts often go away without any treatment. If your sebaceous cyst doesn't, and is bothersome, it may be drained or removed. If the cyst drains on its own, it may return. Resist the temptation to squeeze, pop, stick a needle in it, or cut it open. This often leads to an infection and scarring. If it gets severely inflamed or infected, you should seek medical treatment. Be sure to clean the cyst area when bathing or showering. Watch for the signs of infection listed below.  Follow-up care  Follow up with your doctor or as advised by our staff.  When to seek medical care  Get prompt medical attention if any of the following occur:    Swelling, redness, or pain    Pus coming from the cyst    2025-1980 [employer]. [address] [place] [address], [place] [address]. All rights reserved. This information is not intended as a substitute for professional medical care. Always follow your healthcare professional's instructions.              Follow-ups after your visit        Follow-up notes from your care team     Return in about 1 year (around 1/31/2018).      Your next 10 appointments already scheduled     Jan 31, 2017 11:00 AM   US  EXTREMITY NON VASCULAR LEFT with UCUS3   [facility] ([facility])    [address]  1st Floor  [facility] [address] [phone]           Please bring a list of your medicines (including vitamins, minerals and over-the-counter drugs). Also, tell your doctor about any allergies you may have. Wear comfortable clothes and leave your valuables at home.  You do not need to do anything special to prepare for your exam.  Please call the Imaging Department at your exam site with any questions.            Jul 14, 2017  8:30 AM   (Arrive by 8:15 AM)   RETURN DIABETES with Bushra Rosa MD   [facility] ([facility])    [address]  3rd Floor  [facility] [address] [phone]              Future tests that were ordered for you today     Open Future Orders        Priority Expected Expires Ordered    US Extremity Non Vascular Left Routine  1/31/2018 1/31/2017            Who to contact     Please call your clinic at [phone] to:    Ask questions about your health    Make or cancel appointments    Discuss your medicines    Learn about your test results    Speak to your doctor   If you have compliments or concerns about an experience at your clinic, or if you wish to file a complaint, please contact [facility] Patient Relations at [phone] or email us at [email or web address]         Additional Information About Your Visit        MyChart Information     [employer] gives you secure access to your electronic health record. If you see a primary care provider, you can also send messages to your care team and make appointments. If you have questions, please call your primary care clinic.  If you do not have a primary care provider, please call [phone] and they will assist you.      [employer] is an electronic gateway that provides easy, online access to your medical records. With  "MyChart, you can request a clinic appointment, read your test results, renew a prescription or communicate with your care team.     To access your existing account, please contact your [facility] or call [phone] for assistance.        Care EveryWhere ID     This is your Care EveryWhere ID. This could be used by other organizations to access your [facility] medical records  [id number]        Your Vitals Were     Pulse Respirations Height BMI (Body Mass Index) Pulse Oximetry       66 20 1.778 m (5' 10\") 20.62 kg/m2 100%        Blood Pressure from Last 3 Encounters:   01/31/17 131/84   01/13/17 132/80   08/08/16 120/74    Weight from Last 3 Encounters:   01/31/17 65.182 kg (143 lb 11.2 oz)   01/13/17 65.772 kg (145 lb)   08/08/16 66.679 kg (147 lb)               Primary Care Provider    Doctor Unknown, MD       No address on file        Thank you!     Thank you for choosing [facility]  for your care. Our goal is always to provide you with excellent care. Hearing back from our patients is one way we can continue to improve our services. Please take a few minutes to complete the written survey that you may receive in the mail after your visit with us. Thank you!             Your Updated Medication List - Protect others around you: Learn how to safely use, store and throw away your medicines at www.disposemymeds.org.          This list is accurate as of: 1/31/17  8:27 AM.  Always use your most recent med list.                   Brand Name Dispense Instructions for use    acetone (Urine) test Strp     50 strip    1 each by In Vitro route as needed.       blood glucose monitoring test strip    STAS CONTOUR    200 strip    Patient is transitioning to insulin pump.  Needs these test strips because this is the only meter that works with his pump.  Will need to test 6 times daily       Blood Pressure Monitor Kit     1 kit    Take bp 2 times a week or PRN problems.  Dx DM    " "   insulin aspart 100 UNITS/ML injection    NovoLOG VIAL    40 mL    Pt uses about 40 units per day in the pump       insulin glargine 100 UNIT/ML injection    LANTUS    3 mL    Inject 13 Units Subcutaneous At Bedtime To provide basal insulin If pump fails       insulin syringe 31G X 5/16\" 0.3 ML Misc     100 each    1 each as needed.       lisinopril 10 MG tablet    PRINIVIL/ZESTRIL    90 tablet    Take 1 tablet (10 mg) by mouth daily       MICROLET LANCETS Misc     200 each    1 each 6 times daily       * BD KOKI U/F 32G X 4 MM   Generic drug:  insulin pen needle     200 each    Use 4  daily or as directed.       * Pen Needles 31G X 8 MM Misc      Use as directed       * Notice:  This list has 2 medication(s) that are the same as other medications prescribed for you. Read the directions carefully, and ask your doctor or other care provider to review them with you.      "

## 2017-01-31 NOTE — NURSING NOTE
Chief Complaint   Patient presents with     [facility]     reestablish care/provider-physical     Mass     lump on left hip -that has become bigger and hurts   Addie Pena LPN 7:36 AM on 1/31/2017

## 2017-01-31 NOTE — PROGRESS NOTES
[facility]  New Patient Evaluation    Name: Joseph O Thoen MRN: [id number]       Age: 36 year old           Chief Complaint:    YOB: 1980       CC: [facility], left hip mass           HPI and ROS:   HPI:    36 year old male with a PMH of well controlled diabetes mellitus, likely type1 on insulin pump who presents to establish care w/ complaints of left hip mass    Left hip cyst / mass - 1st noticed ~1 year ago and was relatively stable in size, but swelled up moderately in the last week and developed overlying bruising. Hurts when lying down on it. Doesn't hurt when left alone. Firmer than surrounding skin. No surrounding redness or drainage. Denies f/c/s or pain with ambulation.    No lows since august. Lowest sugar 70 in evening. Symptomatic w/ lows.     ROS:  Review Of Systems  Skin: negative  Eyes: negative  Ears/Nose/Throat: negative  Respiratory: No shortness of breath, dyspnea on exertion, cough, or hemoptysis  Cardiovascular: negative  Gastrointestinal: negative  Genitourinary: negative  Musculoskeletal: negative  Neurologic: negative  Psychiatric: negative  Hematologic/Lymphatic/Immunologic: negative  Endocrine: negative         Past Medical History:     Past Medical History   Diagnosis Date     Diabetes (H)      HTN (hypertension)             Past Surgical History:      Past Surgical History   Procedure Laterality Date     No history of surgery               Social History:     Social History     Social History     Marital Status: Single     Spouse Name: N/A     Number of Children: N/A     Years of Education: N/A     Occupational History     Not on file.     Social History Main Topics     Smoking status: Never Smoker      Smokeless tobacco: Never Used     Alcohol Use: 0.6 oz/week     1 Standard drinks or equivalent per week     Drug Use: No     Sexual Activity:     Partners: Female     Birth Control/ Protection: None     Other Topics Concern     Not on file     Social  "History Narrative     . No kids.  Trying to have kid. Had miscarriage x1 a few weeks ago.            Family History:     Family History   Problem Relation Age of Onset     Glaucoma No family hx of      Macular Degeneration No family hx of      Amblyopia No family hx of      Retinal detachment No family hx of      DIABETES No family hx of      Thyroid Disease Mother 53     Coronary Artery Disease No family hx of      Hypertension Mother      Hyperlipidemia No family hx of      Breast Cancer No family hx of      Colon Cancer No family hx of      Prostate Cancer No family hx of             Immunizations:     Immunization History   Administered Date(s) Administered     Influenza (IIV3) 11/03/2011, 01/18/2013     Influenza Vaccine IM 3yrs+ 4 Valent IIV4 01/09/2014, 01/12/2015     Influenza Vaccine, 3 YRS +, IM (QUADRIVALENT W/PRESERVATIVES) 01/13/2017     Pneumococcal 23 valent 11/03/2011     TDAP (BOOSTRIX AGES 10-64) 08/03/2011             Allergies:     Allergies   Allergen Reactions     Benadryl [Altaryl] Itching             Medications:     Current Outpatient Prescriptions on File Prior to Visit:  insulin glargine (LANTUS) 100 UNIT/ML injection Inject 13 Units Subcutaneous At Bedtime To provide basal insulin If pump fails   insulin aspart (NOVOLOG VIAL) 100 UNITS/ML injection Pt uses about 40 units per day in the pump   lisinopril (PRINIVIL/ZESTRIL) 10 MG tablet Take 1 tablet (10 mg) by mouth daily   blood glucose (STAS CONTOUR) test strip Patient is transitioning to insulin pump.  Needs these test strips because this is the only meter that works with his pump.  Will need to test 6 times daily   MICROLET LANCETS MISC 1 each 6 times daily   acetone, Urine, test STRP 1 each by In Vitro route as needed.   insulin syringe 31G X 5/16\" 0.3 ML MISC 1 each as needed.   Insulin Pen Needle (BD PEN NEEDLE KOKI U/F) 32G X 4 MM MISC Use 4  daily or as directed.   Blood Pressure Monitor KIT Take bp 2 times a week or PRN " "problems.  Dx DM   Insulin Pen Needle (PEN NEEDLES) 31G X 8 MM MISC Use as directed     No current facility-administered medications on file prior to visit.         Exam:   /84 mmHg  Pulse 66  Resp 20  Ht 1.778 m (5' 10\")  Wt 65.182 kg (143 lb 11.2 oz)  BMI 20.62 kg/m2  SpO2 100%    General: NAD, alert and conversational  HEENT: NC/AT, EOMI w/PERRLA. OP clear. MMM no cervical LAD.   CV: Normal S1,S2 with RRR no murmurs, rubs or gallops  Resp: Lungs CTA bilaterally with no wheezes or crackles  Abd: Soft, NTND with no organomegaly or masses  MSK: no joint or bony abnormalities, normal muscle bulk. Left later hip area has a ~3cm circular, mobile firmness in subdermal tissue with overlying soft tissue swelling and mild ecchymois w/ possible central umbilication. No drainage or surrounding erythema. Mildly tender to palpation. No pain with movement of left hip to flexion/extension/internal and external rotation.   Neuro: Grossly nonfocal, CNII-XII intact bilaterally. Sensation intact to light touch and vibration in feet and toes bilaterally.   Skin: No concerning lesions or rashes        Labs:   Reviewed labs in [facility].    A1C  A1C      5.9   8/28/2013  A1C      6.1   1/18/2013  A1C      5.5   9/14/2012  A1C      4.9   3/2/2012  A1C      5.2   11/3/2011     1/13/17 labs:  SCr  0.64  Lipid panel normal  Urine neg for albumin  TSH 0.64         Assessment and Plan:     # (R22.42) Hip region mass, left  (primary encounter diagnosis)  Comment: Most likely benign tissue swelling. Lipoma vs sebaceous cyst. Does not appear infected but given diabetes concern for increased risk of infection if is cystic structure.  Plan: US Extremity Non Vascular Left         # T1DM - well controlled on current pump. Follows with Dr. Rosa. Has had dilated eye exam 1/2017. Urine screen neg for albumin 1/13/17. Last A1c well controlled at 6.2.  - continue current medical management and f/u with Dr. Rosa as needed.    # HTN - well " controlled on Lisinopril  - cont lisinopril    RTC: 1 year or PRN    # health care maintenance:  -blood pressure: well controlled  -lipids: normal 1/13/17    Patient was seen and discussed with Dr. Matthew Owens MD PGY2  P: [phone]    Pt was seen and examined with Dr. Owens.  I agree with his documentation as noted above.    My additional comments: None    Lin Castro MD

## 2017-07-14 ENCOUNTER — OFFICE VISIT (OUTPATIENT)
Dept: ENDOCRINOLOGY | Facility: CLINIC | Age: 37
End: 2017-07-14

## 2017-07-14 VITALS
HEART RATE: 65 BPM | BODY MASS INDEX: 20.94 KG/M2 | DIASTOLIC BLOOD PRESSURE: 79 MMHG | HEIGHT: 70 IN | WEIGHT: 146.3 LBS | SYSTOLIC BLOOD PRESSURE: 144 MMHG

## 2017-07-14 DIAGNOSIS — E10.9 CONTROLLED DIABETES MELLITUS TYPE 1 WITHOUT COMPLICATIONS (H): Primary | ICD-10-CM

## 2017-07-14 ASSESSMENT — PAIN SCALES - GENERAL: PAINLEVEL: NO PAIN (0)

## 2017-07-14 NOTE — PROGRESS NOTES
Endocrinology Progress Note  CC: Diabetes care    HPI:   #1 Diabetes - likely Type 1  The patient was initially seen in May of 2011. He had a 30 pound weight loss with polyuria, polydipsia, and serum glucose of 466. His May 2011 hemoglobin A1c was 13.5. His anion gap of 17. His C-peptide was 0.8 with a glucose of 318. His TSH was normal. His urine was negative for microalbumin. His DEMARCO antibodies were negative. He was initially tried on metformin but if his sugars do not improve over the course of 4 days, he was then started on Lantus and Novolog. His evaluation in September of 2011 was significant for negative anti-islet and anti-insulin antibodies. His August 2011 hemoglobin A1c was 5.8.September 2011 hemoglobin A1c of 5.2. March 2012 hemoglobin A1c of 4.9. In July 2012, the patient started a Medtronic Paradigm pump. The patient is generally very satisfied with his pump. August 2013 evaluation showed low C-peptide at 0.2, concurrent glucose of 80, and negative DEMARCO Antibodies. January 2014 hemoglobin A1c of 6.1. July 2014 hemoglobin A1C is 6.2. In 2014, the patient added CGMS to his pump. January 2015 hemoglobin A1c of 5.9. The patient has met with diabetes education and has adjusted his CGMS.  July 2015 hemoglobin A1c of 6.2.  January 2017 hemoglobin A1c of 6.2    Interval history since last visit:   July 2017 hemoglobin A1c of 6.0.  The patient has reported some fasting a.m. hyperglycemia.  He also reports problems with the sensor being accurate and frequently waking him up at night.  He denies any significant hypoglycemia.    Pump program:  Basal rate:  11 pm to 10 AM at 0.55 units per hour  10 AM to noon at 0.575 units per hour  Noon to 2 PM at 0.55 units per hour  2 PM to 6 PM at 0.575 units per hour  6 PM to 11 pm at 0.625 units per hour    Bolus:  9 PM total 6 AM at one unit per 20 g carbohydrate  6 AM to 11 AM at one unit per 12 g carbohydrate  11 AM to 5:30 PM at one unit per 14 g carbohydrate  5:30 PM to  "9 PM at one unit per 12 g carbohydrate    1 unit per 50 above goal of 100 during the day, and 1 unit per 60 above goal 120 from 9 PM to 6 AM    ROS related to diabetes   CV: none   Neuro:none   Neph: Negative in January 2017 patient on lisinopril 10 mg daily  Eye: Negative in 2017  Infections: patient denies   Dental: pending   Immunizations: pneumonia shot in 2011.  Flu shot in 2017  Lantus prescription given in case of pump failure in July 2015  Glucagon prescription given in an UA 2007    ROS: As stated in HPI, otherwise negative.    PMH: No history of hospitalizations, surgeries.     Medications:   Current Outpatient Prescriptions   Medication Sig Dispense Refill     insulin glargine (LANTUS) 100 UNIT/ML injection Inject 13 Units Subcutaneous At Bedtime To provide basal insulin If pump fails 3 mL 0     insulin aspart (NOVOLOG VIAL) 100 UNITS/ML injection Pt uses about 40 units per day in the pump 40 mL 3     lisinopril (PRINIVIL/ZESTRIL) 10 MG tablet Take 1 tablet (10 mg) by mouth daily 90 tablet 3     blood glucose (STAS CONTOUR) test strip Patient is transitioning to insulin pump.  Needs these test strips because this is the only meter that works with his pump.  Will need to test 6 times daily 200 strip prn     MICROLET LANCETS MISC 1 each 6 times daily 200 each 11     acetone, Urine, test STRP 1 each by In Vitro route as needed. 50 strip 3     insulin syringe 31G X 5/16\" 0.3 ML MISC 1 each as needed. 100 each 3     Insulin Pen Needle (BD PEN NEEDLE KOKI U/F) 32G X 4 MM MISC Use 4  daily or as directed. 200 each 3     Blood Pressure Monitor KIT Take bp 2 times a week or PRN problems.  Dx DM 1 kit 0     Insulin Pen Needle (PEN NEEDLES) 31G X 8 MM MISC Use as directed         Family History:   Thyroid disease, negative for diabetes    Physical Exam:  Vital signs:   /79 (BP Location: Right arm, Patient Position: Chair, Cuff Size: Adult Regular)  Pulse 65  Ht 1.778 m (5' 10\")  Wt 66.4 kg (146 lb 4.8 oz) " " BMI 20.99 kg/m2  Estimated body mass index is 20.99 kg/(m^2) as calculated from the following:    Height as of this encounter: 1.778 m (5' 10\").    Weight as of this encounter: 66.4 kg (146 lb 4.8 oz).  GENERAL APPEARANCE: Alert and no distress  NECK: No lymphadenopathy appreciated  Thyroid: No obvious nodules palpated   CV: RRR without M/R/G  Lungs: CTA bilaterally  Abdomen: Soft, Nontender, non distended, positive bowel sounds   Skin: No infection in feet , intact peripheral pulses  Mood: Normal   Lymph: neg in neck and supraclavicular area    Orders Only on 01/13/2017   Component Date Value Ref Range Status     Creatinine 01/13/2017 0.64* 0.66 - 1.25 mg/dL Final     GFR Estimate 01/13/2017   >60 mL/min/1.7m2 Final                    Value:>90  Non  GFR Calc       GFR Estimate If Black 01/13/2017   >60 mL/min/1.7m2 Final                    Value:>90   GFR Calc       Cholesterol 01/13/2017 135  <200 mg/dL Final     Triglycerides 01/13/2017 38  <150 mg/dL Final     HDL Cholesterol 01/13/2017 64  >39 mg/dL Final     LDL Cholesterol Calculated 01/13/2017 63  <100 mg/dL Final    Desirable:       <100 mg/dl     Non HDL Cholesterol 01/13/2017 70  <130 mg/dL Final     TSH 01/13/2017 0.64  0.40 - 4.00 mU/L Final     Tissue Transglutaminase Antibody I* 01/13/2017 1  <7 U/mL Final    Negative     Tissue Transglutaminase Enriqueta IgG 01/13/2017   <7 U/mL Final                    Value:<1  Negative       Potassium 01/13/2017 4.2  3.4 - 5.3 mmol/L Final             Assessment/Plan    1) Diabetes Mellitus -likely type 1  Patient continues to do well in terms of glycemic control given his recent hemoglobin A1c 1.6.I am uncertain whether the patient has type I or type 2 diabetes, but the evidence for type 1 diabetes include low BMI of 21, and a low C-peptide of 0.8 with glucose of 318 and hx of previous anion gap 17. His C-peptide was low at 0.2 (concurrent glucose of 80) and his DEMARCO antibodies " continue to remain negative as of August 2013.  July 2017 hemoglobin A1c remains reasonable at 6.0    The patient continues with this Medtronic pump with sensor.  However, he frequently has problems with sensor waking him up at night.  He is wondering if there are alternative options.  In addition, he has mild a.m. hyperglycemia.  We will increase his basal rate accordingly.  The patient is excited about his baby, pending delivery in Feb 2018 - congratulated pt    Basal rate:  11 PM to noon at 0.575 units per hour  Noon to 2 PM at 0.55 units per hour  2 PM to 6 PM at 0.575 units per hour  6 PM to 11 PM at 0.625 units per hour    Bolus:  9 PM total 6 AM at one unit per 20 g carbohydrate  6 AM to 11 AM at one unit per 12 g carbohydrate  11 AM to 5:30 PM at one unit per 14 g carbohydrate  5:30 PM to 9 PM at one unit per 12 g carbohydrate    1 unit per 50 above goal of 100 during the day, and 1 unit per 60 above goal 120 from 9 PM to 6 AM    Prescription given for Lantus in case of pump failure.     Patient interest in the possibility of the closed Loop Medtronic pump when it becomes available.  We will talk at return visit.    I did discuss with the patient the possibility of the DEXCOM CGMS.  He is interested.  Will refer for diabetes education.    #2 history of hypertension  Patient slightly hypertensive today.  He is on lisinopril.  Recommended that patient check his blood pressure weekly.  Will call him in roughly 3 weeks.  He will let me know if his systolic blood pressures greater than 140 or diastolic is greater than 90.  The patient understands that that is his hypertension persists we need to increase his lisinopril.    25 minutes spent with patient of which 20 minutes was spent in face-to-face discussion coordination of care    Return visit planned in 6 months, sooner if needed.

## 2017-07-14 NOTE — MR AVS SNAPSHOT
After Visit Summary   7/14/2017    Joseph O Thoen    MRN: [id number]           Patient Information     Date Of Birth          1980        Visit Information        Provider Department      7/14/2017 8:30 AM Bushra Rosa MD  [facility]        Today's Diagnoses     Controlled diabetes mellitus type 1 without complications (H)    -  1      Care Instructions    Pt to check blood pressure once per week at home using home cuff - let me know if it is > 140/90          Follow-ups after your visit        Additional Services     DIABETES EDUCATOR REFERRAL       Pt interested in dexcom sensor with phone katy - please describe to pt                  Follow-up notes from your care team     Return in about 6 months (around 1/14/2018).      Your next 10 appointments already scheduled     Sep 07, 2017  2:30 PM CDT   (Arrive by 2:15 PM)   Office Visit with Cindy Rendon RN   [facility] ([facility])    [address] [place] [id number] [phone]           Bring a current list of meds and any records pertaining to this visit.  For Physicals, please bring immunization records and any forms needing to be filled out.  Please arrive 10 minutes early to complete paperwork.            Jan 19, 2018  8:00 AM CST   (Arrive by 7:45 AM)   RETURN DIABETES with Bushra Rosa MD   [facility]in[facility] ([facility])    [address] [place] [id number] [phone]              Who to contact     Please call your clinic at [phone] to:    Ask questions about your health    Make or cancel appointments    Discuss your medicines    Learn about your test results    Speak to your doctor   If you have compliments or concerns about an experience at your clinic, or if you wish to file a complaint, please contact [facility] Patient Relations at [phone] or email us at  [email or web address]         Additional Information About Your Visit        MyChart Information     [employer] gives you secure access to your electronic health record. If you see a primary care provider, you can also send messages to your care team and make appointments. If you have questions, please call your primary care clinic.  If you do not have a primary care provider, please call [phone] and they will assist you.      [employer] is an electronic gateway that provides easy, online access to your medical records. With [employer], you can request a clinic appointment, read your test results, renew a prescription or communicate with your care team.     To access your existing account, please contact your [facility] or call [phone] for assistance.        Care EveryWhere ID     This is your Care EveryWhere ID. This could be used by other organizations to access your [facility] medical records  [id number]        Your Vitals Were     Pulse Height BMI (Body Mass Index)             65 1.778 m (5' 10\") 20.99 kg/m2          Blood Pressure from Last 3 Encounters:   07/14/17 144/79   01/31/17 131/84   01/13/17 132/80    Weight from Last 3 Encounters:   07/14/17 66.4 kg (146 lb 4.8 oz)   01/31/17 65.2 kg (143 lb 11.2 oz)   01/13/17 65.8 kg (145 lb)              We Performed the Following     DIABETES EDUCATOR REFERRAL          Where to get your medicines      Some of these will need a paper prescription and others can be bought over the counter.  Ask your nurse if you have questions.     Bring a paper prescription for each of these medications     insulin glargine 100 UNIT/ML injection          Primary Care Provider Office Phone # Fax #    Neo Owens [phone]       [facility] [address] [address]        Equal Access to Services     TRUDI ART: Halina Nieves, ernie walker, carlos michael " edmar andujarmary ashleyaajeferson ah. Marci [facility] [phone].    ATENCIÓN: Si jerrellla trice, tiene a altman disposición servicios gratuitos de asistencia lingüística. Leyda al [phone].    We comply with applicable federal civil rights laws and [place] laws. We do not discriminate on the basis of race, color, national origin, age, disability sex, sexual orientation or gender identity.            Thank you!     Thank you for choosing [facility] ENDOCRINOLOGY  for your care. Our goal is always to provide you with excellent care. Hearing back from our patients is one way we can continue to improve our services. Please take a few minutes to complete the written survey that you may receive in the mail after your visit with us. Thank you!             Your Updated Medication List - Protect others around you: Learn how to safely use, store and throw away your medicines at www.disposemymeds.org.          This list is accurate as of: 7/14/17 10:11 AM.  Always use your most recent med list.                   Brand Name Dispense Instructions for use Diagnosis    acetone (Urine) test Strp     50 strip    1 each by In Vitro route as needed.    Diabetes mellitus type 1, controlled (H)       blood glucose monitoring test strip    STAS CONTOUR    200 strip    Patient is transitioning to insulin pump.  Needs these test strips because this is the only meter that works with his pump.  Will need to test 6 times daily    Diabetes mellitus type 1, controlled (H)       Blood Pressure Monitor Kit     1 kit    Take bp 2 times a week or PRN problems.  Dx DM    Diabetes mellitus type 1, controlled (H)       insulin aspart 100 UNITS/ML injection    NovoLOG VIAL    40 mL    Pt uses about 40 units per day in the pump    Controlled diabetes mellitus type 1 without complications (H)       insulin glargine 100 UNIT/ML injection    LANTUS    3 mL    Inject 13 Units Subcutaneous At Bedtime To provide basal insulin If pump fails     "Controlled diabetes mellitus type 1 without complications (H)       insulin syringe 31G X 5/16\" 0.3 ML Misc     100 each    1 each as needed.    Diabetes mellitus type 1, controlled (H)       lisinopril 10 MG tablet    PRINIVIL/ZESTRIL    90 tablet    Take 1 tablet (10 mg) by mouth daily    Controlled diabetes mellitus type 1 without complications (H)       MICROLET LANCETS Misc     200 each    1 each 6 times daily    Diabetes mellitus type 1, controlled (H)       * BD KOKI U/F 32G X 4 MM   Generic drug:  insulin pen needle     200 each    Use 4  daily or as directed.    Diabetes mellitus (H)       * Pen Needles 31G X 8 MM Misc      Use as directed        * Notice:  This list has 2 medication(s) that are the same as other medications prescribed for you. Read the directions carefully, and ask your doctor or other care provider to review them with you.      "

## 2017-07-14 NOTE — NURSING NOTE
"Chief Complaint   Patient presents with     RECHECK     DIABETES TYPE 1 F/U        Initial /79 (BP Location: Right arm, Patient Position: Chair, Cuff Size: Adult Regular)  Pulse 65  Ht 1.778 m (5' 10\")  Wt 66.4 kg (146 lb 4.8 oz)  BMI 20.99 kg/m2 Estimated body mass index is 20.99 kg/(m^2) as calculated from the following:    Height as of this encounter: 1.778 m (5' 10\").    Weight as of this encounter: 66.4 kg (146 lb 4.8 oz).  Medication Reconciliation: complete    "

## 2017-07-14 NOTE — LETTER
7/14/2017       RE: Joseph O Thoen  [address] [facility] [address]     Dear Colleague,    Thank you for referring your patient, Joseph O Thoen, to the [facility] ENDOCRINOLOGY at [facility]. Please see a copy of my visit note below.    Endocrinology Progress Note  CC: Diabetes care    HPI:   #1 Diabetes - likely Type 1  The patient was initially seen in May of 2011. He had a 30 pound weight loss with polyuria, polydipsia, and serum glucose of 466. His May 2011 hemoglobin A1c was 13.5. His anion gap of 17. His C-peptide was 0.8 with a glucose of 318. His TSH was normal. His urine was negative for microalbumin. His DEMARCO antibodies were negative. He was initially tried on metformin but if his sugars do not improve over the course of 4 days, he was then started on Lantus and Novolog. His evaluation in September of 2011 was significant for negative anti-islet and anti-insulin antibodies. His August 2011 hemoglobin A1c was 5.8.September 2011 hemoglobin A1c of 5.2. March 2012 hemoglobin A1c of 4.9. In July 2012, the patient started a Medtronic Paradigm pump. The patient is generally very satisfied with his pump. August 2013 evaluation showed low C-peptide at 0.2, concurrent glucose of 80, and negative DEMARCO Antibodies. January 2014 hemoglobin A1c of 6.1. July 2014 hemoglobin A1C is 6.2. In 2014, the patient added CGMS to his pump. January 2015 hemoglobin A1c of 5.9. The patient has met with diabetes education and has adjusted his CGMS.  July 2015 hemoglobin A1c of 6.2.  January 2017 hemoglobin A1c of 6.2    Interval history since last visit:   July 2017 hemoglobin A1c of 6.0.  The patient has reported some fasting a.m. hyperglycemia.  He also reports problems with the sensor being accurate and frequently waking him up at night.  He denies any significant hypoglycemia.    Pump program:  Basal rate:  11 pm to 10 AM at 0.55 units per hour  10 AM to noon at 0.575 units per hour  Noon  "to 2 PM at 0.55 units per hour  2 PM to 6 PM at 0.575 units per hour  6 PM to 11 pm at 0.625 units per hour    Bolus:  9 PM total 6 AM at one unit per 20 g carbohydrate  6 AM to 11 AM at one unit per 12 g carbohydrate  11 AM to 5:30 PM at one unit per 14 g carbohydrate  5:30 PM to 9 PM at one unit per 12 g carbohydrate    1 unit per 50 above goal of 100 during the day, and 1 unit per 60 above goal 120 from 9 PM to 6 AM    ROS related to diabetes   CV: none   Neuro:none   Neph: Negative in January 2017 patient on lisinopril 10 mg daily  Eye: Negative in 2017  Infections: patient denies   Dental: pending   Immunizations: pneumonia shot in 2011.  Flu shot in 2017  Lantus prescription given in case of pump failure in July 2015  Glucagon prescription given in an UA 2007    ROS: As stated in HPI, otherwise negative.    PMH: No history of hospitalizations, surgeries.     Medications:   Current Outpatient Prescriptions   Medication Sig Dispense Refill     insulin glargine (LANTUS) 100 UNIT/ML injection Inject 13 Units Subcutaneous At Bedtime To provide basal insulin If pump fails 3 mL 0     insulin aspart (NOVOLOG VIAL) 100 UNITS/ML injection Pt uses about 40 units per day in the pump 40 mL 3     lisinopril (PRINIVIL/ZESTRIL) 10 MG tablet Take 1 tablet (10 mg) by mouth daily 90 tablet 3     blood glucose (STAS CONTOUR) test strip Patient is transitioning to insulin pump.  Needs these test strips because this is the only meter that works with his pump.  Will need to test 6 times daily 200 strip prn     MICROLET LANCETS MISC 1 each 6 times daily 200 each 11     acetone, Urine, test STRP 1 each by In Vitro route as needed. 50 strip 3     insulin syringe 31G X 5/16\" 0.3 ML MISC 1 each as needed. 100 each 3     Insulin Pen Needle (BD PEN NEEDLE KOKI U/F) 32G X 4 MM MISC Use 4  daily or as directed. 200 each 3     Blood Pressure Monitor KIT Take bp 2 times a week or PRN problems.  Dx DM 1 kit 0     Insulin Pen Needle (PEN " "NEEDLES) 31G X 8 MM MISC Use as directed         Family History:   Thyroid disease, negative for diabetes    Physical Exam:  Vital signs:   /79 (BP Location: Right arm, Patient Position: Chair, Cuff Size: Adult Regular)  Pulse 65  Ht 1.778 m (5' 10\")  Wt 66.4 kg (146 lb 4.8 oz)  BMI 20.99 kg/m2  Estimated body mass index is 20.99 kg/(m^2) as calculated from the following:    Height as of this encounter: 1.778 m (5' 10\").    Weight as of this encounter: 66.4 kg (146 lb 4.8 oz).  GENERAL APPEARANCE: Alert and no distress  NECK: No lymphadenopathy appreciated  Thyroid: No obvious nodules palpated   CV: RRR without M/R/G  Lungs: CTA bilaterally  Abdomen: Soft, Nontender, non distended, positive bowel sounds   Skin: No infection in feet , intact peripheral pulses  Mood: Normal   Lymph: neg in neck and supraclavicular area    Orders Only on 01/13/2017   Component Date Value Ref Range Status     Creatinine 01/13/2017 0.64* 0.66 - 1.25 mg/dL Final     GFR Estimate 01/13/2017   >60 mL/min/1.7m2 Final                    Value:>90  Non  GFR Calc       GFR Estimate If Black 01/13/2017   >60 mL/min/1.7m2 Final                    Value:>90   GFR Calc       Cholesterol 01/13/2017 135  <200 mg/dL Final     Triglycerides 01/13/2017 38  <150 mg/dL Final     HDL Cholesterol 01/13/2017 64  >39 mg/dL Final     LDL Cholesterol Calculated 01/13/2017 63  <100 mg/dL Final    Desirable:       <100 mg/dl     Non HDL Cholesterol 01/13/2017 70  <130 mg/dL Final     TSH 01/13/2017 0.64  0.40 - 4.00 mU/L Final     Tissue Transglutaminase Antibody I* 01/13/2017 1  <7 U/mL Final    Negative     Tissue Transglutaminase Enriqueta IgG 01/13/2017   <7 U/mL Final                    Value:<1  Negative       Potassium 01/13/2017 4.2  3.4 - 5.3 mmol/L Final             Assessment/Plan    1) Diabetes Mellitus -likely type 1  Patient continues to do well in terms of glycemic control given his recent hemoglobin A1c 1.6.I am " uncertain whether the patient has type I or type 2 diabetes, but the evidence for type 1 diabetes include low BMI of 21, and a low C-peptide of 0.8 with glucose of 318 and hx of previous anion gap 17. His C-peptide was low at 0.2 (concurrent glucose of 80) and his DEMARCO antibodies continue to remain negative as of August 2013.  July 2017 hemoglobin A1c remains reasonable at 6.0    The patient continues with this Medtronic pump with sensor.  However, he frequently has problems with sensor waking him up at night.  He is wondering if there are alternative options.  In addition, he has mild a.m. hyperglycemia.  We will increase his basal rate accordingly.  The patient is excited about his baby, pending delivery in Feb 2018 - congratulated pt    Basal rate:  11 PM to noon at 0.575 units per hour  Noon to 2 PM at 0.55 units per hour  2 PM to 6 PM at 0.575 units per hour  6 PM to 11 PM at 0.625 units per hour    Bolus:  9 PM total 6 AM at one unit per 20 g carbohydrate  6 AM to 11 AM at one unit per 12 g carbohydrate  11 AM to 5:30 PM at one unit per 14 g carbohydrate  5:30 PM to 9 PM at one unit per 12 g carbohydrate    1 unit per 50 above goal of 100 during the day, and 1 unit per 60 above goal 120 from 9 PM to 6 AM    Prescription given for Lantus in case of pump failure.     Patient interest in the possibility of the closed Loop Medtronic pump when it becomes available.  We will talk at return visit.    I did discuss with the patient the possibility of the DEXCOM CGMS.  He is interested.  Will refer for diabetes education.    #2 history of hypertension  Patient slightly hypertensive today.  He is on lisinopril.  Recommended that patient check his blood pressure weekly.  Will call him in roughly 3 weeks.  He will let me know if his systolic blood pressures greater than 140 or diastolic is greater than 90.  The patient understands that that is his hypertension persists we need to increase his lisinopril.    25 minutes  spent with patient of which 20 minutes was spent in face-to-face discussion coordination of care    Return visit planned in 6 months, sooner if needed.    Bushra Rosa MD

## 2017-09-07 ENCOUNTER — MEDICAL CORRESPONDENCE (OUTPATIENT)
Dept: HEALTH INFORMATION MANAGEMENT | Facility: CLINIC | Age: 37
End: 2017-09-07

## 2017-09-07 ENCOUNTER — OFFICE VISIT (OUTPATIENT)
Dept: EDUCATION SERVICES | Facility: CLINIC | Age: 37
End: 2017-09-07

## 2017-09-07 DIAGNOSIS — E10.9 DIABETES MELLITUS TYPE 1, CONTROLLED (H): Primary | ICD-10-CM

## 2017-09-07 NOTE — MR AVS SNAPSHOT
After Visit Summary   9/7/2017    Joseph O Thoen    MRN: [id number]           Patient Information     Date Of Birth          1980        Visit Information        Provider Department      9/7/2017 2:30 PM Cindy Rendon RN M [facility] Diabetes        Today's Diagnoses     Diabetes mellitus type 1, controlled (H)    -  1       Follow-ups after your visit        Your next 10 appointments already scheduled     Jan 19, 2018  8:00 AM CST   (Arrive by 7:45 AM)   RETURN DIABETES with MD KARLEY Amado [facility] Endocrinology ([facility])    [address] [place] [address] [phone]              Who to contact     Please call your clinic at [phone] to:    Ask questions about your health    Make or cancel appointments    Discuss your medicines    Learn about your test results    Speak to your doctor   If you have compliments or concerns about an experience at your clinic, or if you wish to file a complaint, please contact [facility] Physicians Patient Relations at [phone] or email us at [email or web address]         Additional Information About Your Visit        MyChart Information     Bedloot gives you secure access to your electronic health record. If you see a primary care provider, you can also send messages to your care team and make appointments. If you have questions, please call your primary care clinic.  If you do not have a primary care provider, please call [phone] and they will assist you.      Bedloot is an electronic gateway that provides easy, online access to your medical records. With SafedoX, you can request a clinic appointment, read your test results, renew a prescription or communicate with your care team.     To access your existing account, please contact your [facility] Clinic or call [phone] for assistance.        Care EveryWhere ID     This is your Care  EveryWhere ID. This could be used by other organizations to access your [facility] medical records  [id number]         Blood Pressure from Last 3 Encounters:   07/14/17 144/79   01/31/17 131/84   01/13/17 132/80    Weight from Last 3 Encounters:   07/14/17 66.4 kg (146 lb 4.8 oz)   01/31/17 65.2 kg (143 lb 11.2 oz)   01/13/17 65.8 kg (145 lb)              Today, you had the following     No orders found for display       Primary Care Provider Office Phone # Fax #    Neo Owens [phone]       [facility] [address] [facility] [address]  [facility] [address]        Equal Access to Services     TRUDI VANN : Hadii aad ku hadasho Soomaali, waaxda luqadaha, qaybta kaalmada adeegyada, waxay inderin heath [place]. So [facility] [phone].    ATENCIÓN: Si habla español, tiene a altman disposición servicios gratuitos de asistencia lingüística. Llame al [phone].    We comply with applicable federal civil rights laws and [place] laws. We do not discriminate on the basis of race, color, national origin, age, disability sex, sexual orientation or gender identity.            Thank you!     Thank you for choosing [facility] DIABETES  for your care. Our goal is always to provide you with excellent care. Hearing back from our patients is one way we can continue to improve our services. Please take a few minutes to complete the written survey that you may receive in the mail after your visit with us. Thank you!             Your Updated Medication List - Protect others around you: Learn how to safely use, store and throw away your medicines at www.disposemymeds.org.          This list is accurate as of: 9/7/17 11:59 PM.  Always use your most recent med list.                   Brand Name Dispense Instructions for use Diagnosis    acetone (Urine) test Strp     50 strip    1 each by In Vitro route as needed.    Diabetes mellitus type 1, controlled (H)       blood glucose monitoring test strip     "STSA CONTOUR    200 strip    Patient is transitioning to insulin pump.  Needs these test strips because this is the only meter that works with his pump.  Will need to test 6 times daily    Diabetes mellitus type 1, controlled (H)       Blood Pressure Monitor Kit     1 kit    Take bp 2 times a week or PRN problems.  Dx DM    Diabetes mellitus type 1, controlled (H)       insulin aspart 100 UNITS/ML injection    NovoLOG VIAL    40 mL    Pt uses about 40 units per day in the pump    Controlled diabetes mellitus type 1 without complications (H)       insulin glargine 100 UNIT/ML injection    LANTUS    3 mL    Inject 13 Units Subcutaneous At Bedtime To provide basal insulin If pump fails    Controlled diabetes mellitus type 1 without complications (H)       insulin syringe 31G X 5/16\" 0.3 ML Misc     100 each    1 each as needed.    Diabetes mellitus type 1, controlled (H)       lisinopril 10 MG tablet    PRINIVIL/ZESTRIL    90 tablet    Take 1 tablet (10 mg) by mouth daily    Controlled diabetes mellitus type 1 without complications (H)       MICROLET LANCETS Misc     200 each    1 each 6 times daily    Diabetes mellitus type 1, controlled (H)       * BD KOKI U/F 32G X 4 MM   Generic drug:  insulin pen needle     200 each    Use 4  daily or as directed.    Diabetes mellitus (H)       * Pen Needles 31G X 8 MM Misc      Use as directed        * Notice:  This list has 2 medication(s) that are the same as other medications prescribed for you. Read the directions carefully, and ask your doctor or other care provider to review them with you.      "

## 2017-09-08 ENCOUNTER — MEDICAL CORRESPONDENCE (OUTPATIENT)
Dept: HEALTH INFORMATION MANAGEMENT | Facility: CLINIC | Age: 37
End: 2017-09-08

## 2017-09-19 NOTE — PROGRESS NOTES
DIABETES EDUCATION NOTE:    Joseph O Thoen presents today for education related to Type 1 diabetes    Patient is being treated with:  insulin pump  He is accompanied by self    PATIENT CONCERNS RELATED TO DIABETES SELF MANAGEMENT:   Here to discuss upgrading his current pump to the 670G HCL system.  He is currently using the 530G with the CGM.  He wears his CGM consistently.    ASSESSMENT:    Monitoring  Patient glucose self monitoring as follows: four times daily.   BG meter: Contour Next USB Link (with Medtronic Pump) meter  BG results: Not reviewed today     Patient's most recent   Lab Results   Component Value Date    A1C 5.9 08/28/2013    is meeting goal of <7.0                        EDUCATION and INSTRUCTION PROVIDED AT THIS VISIT:      Reviewed Yung's indications for 670G system.  He is currently out of warranty with his pump and ready to start using the HCL system.    Discussed how the pump works, reasonable expectations, and process for upgrading.  Paperwork completed and CMN sent to [employer].  He may not be receiving the pump until well into the new year.  He verbalized understanding of this.    Explained the process for training.  Will start in manual mode and then auto mode after a minimum of 72 hours.  Explained importance of establishing Carelink account.         Patient-stated goal written and given to Joseph O Thoen.  Verbalized and demonstrated understanding of instructions.     PLAN:  See patient instructions  AVS printed and given to patient    FOLLOW-UP:    Y[employer] will call to set up training appointment once he has a better idea of when the pump will be delivered.       Time spent with patient at today's visit was 30 minutes.      Any diabetes medication dose changes were made via the CDE Protocol and Collaborative Practice Agreement with [place] and  Julien.  A copy of this encounter was provided to patient's referring provider.

## 2017-11-16 ENCOUNTER — OFFICE VISIT (OUTPATIENT)
Dept: EDUCATION SERVICES | Facility: CLINIC | Age: 37
End: 2017-11-16

## 2017-11-16 DIAGNOSIS — E10.9 DIABETES MELLITUS TYPE 1, CONTROLLED (H): Primary | ICD-10-CM

## 2017-11-18 NOTE — PROGRESS NOTES
Diabetes Self Management Training: Insulin Pump Start    SUBJECTIVE:  Patient presents for a Medtronic 670 G insulin pump start accompanied by self  OBJECTIVE:    Patient is checking blood sugar: 4 to 8 (averages 5) times daily     Patient is currently wearing glucose sensor? NO  Has worn sensor in the past? NO    ASSESSMENT / INTERVENTION:    Patient instructed on basic insulin pump topics and insulin pump programming. See pump company pump start checklist scanned and attached to this encounter for details.    Insulin pump was programmed according to the Pump Start Orders signed by MD and attached to this encounter:     Pump Settings:    BASAL RATES and times:  12   AM (midnight): 0.575 units/hour      03:30 AM time added today because he is waking up over 150 consistently 0.625 units/hour  10   AM: 0.575 units/hour   12  PM (noon): 0.550 units/hour   2    PM: 0.575 units/hour (removed this time today as it was redundant)  6    PM: 0.625 units/hour   11   PM: 0.550 units/hour     Carb ratio:   CARB RATIO and times:  12   AM (midnight): 20  6     AM:  12  11   AM: 14  5:30   PM:  12  9    PM:  20    Corection Factor (Sensitivity) and times:  12   AM (midnight): 60 mg/dL  6     AM: 50 mg/dL  9:30PM: 60 mg/dL    Target BG Ranges:   BLOOD GLUCOSE TARGET and times:  12   AM (midnight): 100-120   7     AM:  80 - 100  11   PM:  100 - 120    Active insulin time: 4 hours      Dual wave/Square wave Bolus: on     BG Reminder: off    Infusion set: Medtronic Quick Set    Patient is in: manual mode until his sensors arrive after the first of the year.     FOLLOW-UP:  Encouraged to reactivate his Carelink account so we can more easily access when he downloads from home or in the clinic.      Post-pump start follow-up appointment TBD when he has received his sensors.      Time Spent: 60  Visit Type: Individual    Scanned documents: Insulin pump initiation settings with MD signature, Insulin pump start checklist.

## 2018-01-15 ENCOUNTER — OFFICE VISIT (OUTPATIENT)
Dept: EDUCATION SERVICES | Facility: CLINIC | Age: 38
End: 2018-01-15
Payer: COMMERCIAL

## 2018-01-15 DIAGNOSIS — E10.9 DIABETES MELLITUS TYPE 1, CONTROLLED (H): Primary | ICD-10-CM

## 2018-01-15 NOTE — MR AVS SNAPSHOT
After Visit Summary   1/15/2018    Joseph O Thoen    MRN: [id number]           Patient Information     Date Of Birth          1980        Visit Information        Provider Department      1/15/2018 2:30 PM Cindy Rendon RN M [facility] Diabetes        Today's Diagnoses     Diabetes mellitus type 1, controlled (H)    -  1       Follow-ups after your visit        Your next 10 appointments already scheduled     Jan 26, 2018  8:20 AM CST   (Arrive by 8:05 AM)   RETURN GENERAL with ILA Diaz [facility] Ophthalmology ([facility])    [address]  4th Floor  [facility] [id number] [phone]            Jul 20, 2018  8:30 AM CDT   (Arrive by 8:15 AM)   RETURN DIABETES with MD KARLEY Amado [facility] Endocrinology ([facility])    [address]  [address] [facility] [id number] [phone]              Who to contact     Please call your clinic at [phone] to:    Ask questions about your health    Make or cancel appointments    Discuss your medicines    Learn about your test results    Speak to your doctor   If you have compliments or concerns about an experience at your clinic, or if you wish to file a complaint, please contact [facility] Physicians Patient Relations at [phone] or email us at [email or web address]         Additional Information About Your Visit        MyChart Information     [employer] gives you secure access to your electronic health record. If you see a primary care provider, you can also send messages to your care team and make appointments. If you have questions, please call your primary care clinic.  If you do not have a primary care provider, please call [phone] and they will assist you.      Notorious is an electronic gateway that provides easy, online access to your medical records. With Notorious, you can request a clinic appointment, read your test results,  renew a prescription or communicate with your care team.     To access your existing account, please contact your [facility] Physicians Clinic or call [phone] for assistance.        Care EveryWhere ID     This is your Care EveryWhere ID. This could be used by other organizations to access your [facility] medical records  [id number]         Blood Pressure from Last 3 Encounters:   01/19/18 145/82   07/14/17 144/79   01/31/17 131/84    Weight from Last 3 Encounters:   01/19/18 67.6 kg (149 lb 1.6 oz)   07/14/17 66.4 kg (146 lb 4.8 oz)   01/31/17 65.2 kg (143 lb 11.2 oz)              We Performed the Following     DIABETES EDUCATION - Individual  []        Primary Care Provider Office Phone # Fax #    Neo Owens [phone]       [address]  [facility] [address]        Equal Access to Services     SHANAE [facility] HospitalYOMI : Hadii aad ku hadasho Soomaali, waaxda luqadaha, qaybta kaalmada adeegyada, waxay idiin hayaan klebereg esthelaarawen wolf . So [place] [phone].    ATENCIÓN: Si habla español, tiene a altman disposición servicios gratuitos de asistencia lingüística. Llame al [phone].    We comply with applicable federal civil rights laws and [place] laws. We do not discriminate on the basis of race, color, national origin, age, disability, sex, sexual orientation, or gender identity.            Thank you!     Thank you for choosing [facility] DIABETES  for your care. Our goal is always to provide you with excellent care. Hearing back from our patients is one way we can continue to improve our services. Please take a few minutes to complete the written survey that you may receive in the mail after your visit with us. Thank you!             Your Updated Medication List - Protect others around you: Learn how to safely use, store and throw away your medicines at www.disposemymeds.org.          This list is accurate as of 1/15/18 11:59 PM.   "Always use your most recent med list.                   Brand Name Dispense Instructions for use Diagnosis    acetone (Urine) test Strp     50 strip    1 each by In Vitro route as needed.    Diabetes mellitus type 1, controlled (H)       Blood Pressure Monitor Kit     1 kit    Take bp 2 times a week or PRN problems.  Dx DM    Diabetes mellitus type 1, controlled (H)       insulin syringe 31G X 5/16\" 0.3 ML Misc     100 each    1 each as needed.    Diabetes mellitus type 1, controlled (H)       lisinopril 10 MG tablet    PRINIVIL/ZESTRIL    90 tablet    Take 1 tablet (10 mg) by mouth daily    Controlled diabetes mellitus type 1 without complications (H)       * BD KOKI U/F 32G X 4 MM   Generic drug:  insulin pen needle     200 each    Use 4  daily or as directed.    Diabetes mellitus (H)       * Pen Needles 31G X 8 MM Misc      Use as directed        * Notice:  This list has 2 medication(s) that are the same as other medications prescribed for you. Read the directions carefully, and ask your doctor or other care provider to review them with you.      "

## 2018-01-16 DIAGNOSIS — E10.9 CONTROLLED DIABETES MELLITUS TYPE 1 WITHOUT COMPLICATIONS (H): ICD-10-CM

## 2018-01-19 ENCOUNTER — OFFICE VISIT (OUTPATIENT)
Dept: ENDOCRINOLOGY | Facility: CLINIC | Age: 38
End: 2018-01-19
Payer: COMMERCIAL

## 2018-01-19 VITALS
SYSTOLIC BLOOD PRESSURE: 145 MMHG | BODY MASS INDEX: 21.35 KG/M2 | WEIGHT: 149.1 LBS | DIASTOLIC BLOOD PRESSURE: 82 MMHG | HEART RATE: 60 BPM | HEIGHT: 70 IN

## 2018-01-19 DIAGNOSIS — E10.9 CONTROLLED DIABETES MELLITUS TYPE 1 WITHOUT COMPLICATIONS (H): Primary | ICD-10-CM

## 2018-01-19 LAB — HBA1C MFR BLD: 5.7 % (ref 4.3–6)

## 2018-01-19 RX ORDER — LANCETS
1 EACH MISCELLANEOUS
Qty: 200 EACH | Refills: 11 | Status: SHIPPED | OUTPATIENT
Start: 2018-01-19 | End: 2019-11-01

## 2018-01-19 RX ORDER — LISINOPRIL 5 MG/1
TABLET ORAL
Qty: 270 TABLET | Refills: 1 | Status: SHIPPED | OUTPATIENT
Start: 2018-01-19 | End: 2018-10-12

## 2018-01-19 ASSESSMENT — PAIN SCALES - GENERAL: PAINLEVEL: NO PAIN (0)

## 2018-01-19 NOTE — PROGRESS NOTES
Endocrinology Progress Note  CC: Diabetes care    HPI:   #1 Diabetes - likely Type 1  The patient was initially seen in May of 2011. He had a 30 pound weight loss with polyuria, polydipsia, and serum glucose of 466. His May 2011 hemoglobin A1c was 13.5. His anion gap of 17. His C-peptide was 0.8 with a glucose of 318. His TSH was normal. His urine was negative for microalbumin. His DEMARCO antibodies were negative. He was initially tried on metformin but if his sugars do not improve over the course of 4 days, he was then started on Lantus and Novolog. His evaluation in September of 2011 was significant for negative anti-islet and anti-insulin antibodies. His August 2011 hemoglobin A1c was 5.8.September 2011 hemoglobin A1c of 5.2. March 2012 hemoglobin A1c of 4.9. In July 2012, the patient started a Medtronic Paradigm pump. The patient is generally very satisfied with his pump. August 2013 evaluation showed low C-peptide at 0.2, concurrent glucose of 80, and negative DEMARCO Antibodies. January 2014 hemoglobin A1c of 6.1. July 2014 hemoglobin A1C is 6.2. In 2014, the patient added CGMS to his pump. January 2015 hemoglobin A1c of 5.9. The patient has met with diabetes education and has adjusted his CGMS.  July 2015 hemoglobin A1c of 6.2.  January 2017 hemoglobin A1c of 6.2. July 2017 hemoglobin A1c of 6.0.      Interval history since last visit: January 2018 hemoglobin A1c of 5.7.  He reports that his hypoglycemia overnight is essentially minimal.  He reports he is having some issues with frequently having to calibrate his sensor.  He is hopeful that the auto function on his new pump will work more smoothly moving forward.    Basal rate:( when not in auto mode)  11 PM to noon at 0.575 units per hour  Noon to 2 PM at 0.55 units per hour  2 PM to 6 PM at 0.575 units per hour  6 PM to 11 PM at 0.625 units per hour    Bolus:  9 PM total 6 AM at one unit per 20 g carbohydrate  6 AM to 11 AM at one unit per 12 g carbohydrate  11 AM  "to 5:30 PM at one unit per 14 g carbohydrate  5:30 PM to 9 PM at one unit per 12 g carbohydrate    1 unit per 50 above goal of 100 during the day, and 1 unit per 60 above goal 120 from 9 PM to 6 AM    ROS related to diabetes   CV: none   Neuro:none   Neph: Negative in January 2017 patient on lisinopril 10 mg daily  Eye: Negative in 2017  Infections: patient denies   Dental: pending   Immunizations: pneumonia shot in 2011.  Flu shot in 2017  Lantus prescription given in case of pump failure in July 2015  Glucagon prescription given in 2016    #2 hypertension  The patient has a history of mild hypertension.  Reports home blood pressures generally \"< than 140s\" .  He is been on lisinopril 10 mg daily since roughly 2015.    ROS: As stated in HPI, otherwise negative.    PMH: No history of hospitalizations, surgeries.     Medications:   Current Outpatient Prescriptions   Medication Sig Dispense Refill     insulin aspart (NOVOLOG VIAL) 100 UNITS/ML injection Pt uses about 40 units per day in the pump 40 mL 3     insulin glargine (LANTUS) 100 UNIT/ML injection Inject 13 Units Subcutaneous At Bedtime To provide basal insulin If pump fails 3 mL 0     lisinopril (PRINIVIL/ZESTRIL) 10 MG tablet Take 1 tablet (10 mg) by mouth daily 90 tablet 3     blood glucose (STAS CONTOUR) test strip Patient is transitioning to insulin pump.  Needs these test strips because this is the only meter that works with his pump.  Will need to test 6 times daily 200 strip prn     MICROLET LANCETS MISC 1 each 6 times daily 200 each 11     acetone, Urine, test STRP 1 each by In Vitro route as needed. 50 strip 3     insulin syringe 31G X 5/16\" 0.3 ML MISC 1 each as needed. 100 each 3     Insulin Pen Needle (BD PEN NEEDLE KOKI U/F) 32G X 4 MM MISC Use 4  daily or as directed. 200 each 3     Blood Pressure Monitor KIT Take bp 2 times a week or PRN problems.  Dx DM 1 kit 0     Insulin Pen Needle (PEN NEEDLES) 31G X 8 MM MISC Use as directed         Family " "History:   Thyroid disease, negative for diabetes    Physical Exam:  Vital signs:   /82  Pulse 60  Ht 1.778 m (5' 10\")  Wt 67.6 kg (149 lb 1.6 oz)  BMI 21.39 kg/m2  Estimated body mass index is 21.39 kg/(m^2) as calculated from the following:    Height as of this encounter: 1.778 m (5' 10\").    Weight as of this encounter: 67.6 kg (149 lb 1.6 oz).  GENERAL APPEARANCE: Alert and no distress  NECK: No lymphadenopathy appreciated  Thyroid: No obvious nodules palpated   CV: RRR without M/R/G  Lungs: CTA bilaterally  Abdomen: Soft, Nontender, non distended, positive bowel sounds   Skin: No infection in feet , intact peripheral pulses  Mood: Normal   Lymph: neg in neck and supraclavicular area    Orders Only on 01/13/2017   Component Date Value Ref Range Status     Creatinine 01/13/2017 0.64* 0.66 - 1.25 mg/dL Final     GFR Estimate 01/13/2017   >60 mL/min/1.7m2 Final                    Value:>90  Non  GFR Calc       GFR Estimate If Black 01/13/2017   >60 mL/min/1.7m2 Final                    Value:>90   GFR Calc       Cholesterol 01/13/2017 135  <200 mg/dL Final     Triglycerides 01/13/2017 38  <150 mg/dL Final     HDL Cholesterol 01/13/2017 64  >39 mg/dL Final     LDL Cholesterol Calculated 01/13/2017 63  <100 mg/dL Final    Desirable:       <100 mg/dl     Non HDL Cholesterol 01/13/2017 70  <130 mg/dL Final     TSH 01/13/2017 0.64  0.40 - 4.00 mU/L Final     Tissue Transglutaminase Antibody I* 01/13/2017 1  <7 U/mL Final    Negative     Tissue Transglutaminase Enriqueta IgG 01/13/2017   <7 U/mL Final                    Value:<1  Negative       Potassium 01/13/2017 4.2  3.4 - 5.3 mmol/L Final       Assessment/Plan    1) Diabetes Mellitus -likely type 1  Patient continues to do well in terms of glycemic control given his recent hemoglobin A1c 1.6.I am uncertain whether the patient has type I or type 2 diabetes, but the evidence for type 1 diabetes include low BMI of 21, and a low " "C-peptide of 0.8 with glucose of 318 and hx of previous anion gap 17. His C-peptide was low at 0.2 (concurrent glucose of 80) and his DEMARCO antibodies continue to remain negative as of August 2013.  Patient started on closed-loop artificial pump in January 2018.    January 2018 hemoglobin A1c of 5.7.  Patient now on the closed-loop artificial pump as of January 2018.  He still feels the need to manage the pump, but is willing to wait on the \"auto mode\" for to kick in.  He takes about 36 units daily.  Auto mode  is roughly 21% of the time.  He understands to revert back to manual mode if he feels that the auto mode is not helping in a few weeks.    Prescription given for Lantus in case of pump failure.     Referral for ophthalmology made.  Refills for Lantus, in case of pump failure, as well as NovoLog and test strips given.    #2 history of hypertension  We will increase his lisinopril to 15 mg daily.    Patient return in 1 week to recheck his blood for potassium and creatinine.  We will also check vitamin D, albumin, TSH, lipid profile, urine for microalbumin, TTG at that time.    25 minutes spent with patient of which 20 minutes was spent in face-to-face discussion coordination of care    Return visit planned in 6 months, sooner if needed.    "

## 2018-01-19 NOTE — LETTER
1/19/2018       RE: Joseph O Thoen  [address] [facility] [address]     Dear Colleague,    Thank you for referring your patient, Joseph O Thoen, to the [facility] ENDOCR[facility]OLOGY at [facility]. Please see a copy of my visit note below.    Endocrinology Progress Note  CC: Diabetes care    HPI:   #1 Diabetes - likely Type 1  The patient was initially seen in May of 2011. He had a 30 pound weight loss with polyuria, polydipsia, and serum glucose of 466. His May 2011 hemoglobin A1c was 13.5. His anion gap of 17. His C-peptide was 0.8 with a glucose of 318. His TSH was normal. His urine was negative for microalbumin. His DEMARCO antibodies were negative. He was initially tried on metformin but if his sugars do not improve over the course of 4 days, he was then started on Lantus and Novolog. His evaluation in September of 2011 was significant for negative anti-islet and anti-insulin antibodies. His August 2011 hemoglobin A1c was 5.8.September 2011 hemoglobin A1c of 5.2. March 2012 hemoglobin A1c of 4.9. In July 2012, the patient started a Medtronic Paradigm pump. The patient is generally very satisfied with his pump. August 2013 evaluation showed low C-peptide at 0.2, concurrent glucose of 80, and negative DEMARCO Antibodies. January 2014 hemoglobin A1c of 6.1. July 2014 hemoglobin A1C is 6.2. In 2014, the patient added CGMS to his pump. January 2015 hemoglobin A1c of 5.9. The patient has met with diabetes education and has adjusted his CGMS.  July 2015 hemoglobin A1c of 6.2.  January 2017 hemoglobin A1c of 6.2. July 2017 hemoglobin A1c of 6.0.      Interval history since last visit: January 2018 hemoglobin A1c of 5.7.  He reports that his hypoglycemia overnight is essentially minimal.  He reports he is having some issues with frequently having to calibrate his sensor.  He is hopeful that the auto function on his new pump will work more smoothly moving forward.    Basal rate:( when not  "in auto mode)  11 PM to noon at 0.575 units per hour  Noon to 2 PM at 0.55 units per hour  2 PM to 6 PM at 0.575 units per hour  6 PM to 11 PM at 0.625 units per hour    Bolus:  9 PM total 6 AM at one unit per 20 g carbohydrate  6 AM to 11 AM at one unit per 12 g carbohydrate  11 AM to 5:30 PM at one unit per 14 g carbohydrate  5:30 PM to 9 PM at one unit per 12 g carbohydrate    1 unit per 50 above goal of 100 during the day, and 1 unit per 60 above goal 120 from 9 PM to 6 AM    ROS related to diabetes   CV: none   Neuro:none   Neph: Negative in January 2017 patient on lisinopril 10 mg daily  Eye: Negative in 2017  Infections: patient denies   Dental: pending   Immunizations: pneumonia shot in 2011.  Flu shot in 2017  Lantus prescription given in case of pump failure in July 2015  Glucagon prescription given in 2016    #2 hypertension  The patient has a history of mild hypertension.  Reports home blood pressures generally \"< than 140s\" .  He is been on lisinopril 10 mg daily since roughly 2015.    ROS: As stated in HPI, otherwise negative.    PMH: No history of hospitalizations, surgeries.     Medications:   Current Outpatient Prescriptions   Medication Sig Dispense Refill     insulin aspart (NOVOLOG VIAL) 100 UNITS/ML injection Pt uses about 40 units per day in the pump 40 mL 3     insulin glargine (LANTUS) 100 UNIT/ML injection Inject 13 Units Subcutaneous At Bedtime To provide basal insulin If pump fails 3 mL 0     lisinopril (PRINIVIL/ZESTRIL) 10 MG tablet Take 1 tablet (10 mg) by mouth daily 90 tablet 3     blood glucose (STAS CONTOUR) test strip Patient is transitioning to insulin pump.  Needs these test strips because this is the only meter that works with his pump.  Will need to test 6 times daily 200 strip prn     MICROLET LANCETS MISC 1 each 6 times daily 200 each 11     acetone, Urine, test STRP 1 each by In Vitro route as needed. 50 strip 3     insulin syringe 31G X 5/16\" 0.3 ML MISC 1 each as needed. " "100 each 3     Insulin Pen Needle (BD PEN NEEDLE KOKI U/F) 32G X 4 MM MISC Use 4  daily or as directed. 200 each 3     Blood Pressure Monitor KIT Take bp 2 times a week or PRN problems.  Dx DM 1 kit 0     Insulin Pen Needle (PEN NEEDLES) 31G X 8 MM MISC Use as directed         Family History:   Thyroid disease, negative for diabetes    Physical Exam:  Vital signs:   /82  Pulse 60  Ht 1.778 m (5' 10\")  Wt 67.6 kg (149 lb 1.6 oz)  BMI 21.39 kg/m2  Estimated body mass index is 21.39 kg/(m^2) as calculated from the following:    Height as of this encounter: 1.778 m (5' 10\").    Weight as of this encounter: 67.6 kg (149 lb 1.6 oz).  GENERAL APPEARANCE: Alert and no distress  NECK: No lymphadenopathy appreciated  Thyroid: No obvious nodules palpated   CV: RRR without M/R/G  Lungs: CTA bilaterally  Abdomen: Soft, Nontender, non distended, positive bowel sounds   Skin: No infection in feet , intact peripheral pulses  Mood: Normal   Lymph: neg in neck and supraclavicular area    Orders Only on 01/13/2017   Component Date Value Ref Range Status     Creatinine 01/13/2017 0.64* 0.66 - 1.25 mg/dL Final     GFR Estimate 01/13/2017   >60 mL/min/1.7m2 Final                    Value:>90  Non  GFR Calc       GFR Estimate If Black 01/13/2017   >60 mL/min/1.7m2 Final                    Value:>90   GFR Calc       Cholesterol 01/13/2017 135  <200 mg/dL Final     Triglycerides 01/13/2017 38  <150 mg/dL Final     HDL Cholesterol 01/13/2017 64  >39 mg/dL Final     LDL Cholesterol Calculated 01/13/2017 63  <100 mg/dL Final    Desirable:       <100 mg/dl     Non HDL Cholesterol 01/13/2017 70  <130 mg/dL Final     TSH 01/13/2017 0.64  0.40 - 4.00 mU/L Final     Tissue Transglutaminase Antibody I* 01/13/2017 1  <7 U/mL Final    Negative     Tissue Transglutaminase Enriqueta IgG 01/13/2017   <7 U/mL Final                    Value:<1  Negative       Potassium 01/13/2017 4.2  3.4 - 5.3 mmol/L Final " "      Assessment/Plan    1) Diabetes Mellitus -likely type 1  Patient continues to do well in terms of glycemic control given his recent hemoglobin A1c 1.6.I am uncertain whether the patient has type I or type 2 diabetes, but the evidence for type 1 diabetes include low BMI of 21, and a low C-peptide of 0.8 with glucose of 318 and hx of previous anion gap 17. His C-peptide was low at 0.2 (concurrent glucose of 80) and his DEMARCO antibodies continue to remain negative as of August 2013.  Patient started on closed-loop artificial pump in January 2018.    January 2018 hemoglobin A1c of 5.7.  Patient now on the closed-loop artificial pump as of January 2018.  He still feels the need to manage the pump, but is willing to wait on the \"auto mode\" for to kick in.  He takes about 36 units daily.  Auto mode  is roughly 21% of the time.  He understands to revert back to manual mode if he feels that the auto mode is not helping in a few weeks.    Prescription given for Lantus in case of pump failure.     Referral for ophthalmology made.  Refills for Lantus, in case of pump failure, as well as NovoLog and test strips given.    #2 history of hypertension  We will increase his lisinopril to 15 mg daily.    Patient return in 1 week to recheck his blood for potassium and creatinine.  We will also check vitamin D, albumin, TSH, lipid profile, urine for microalbumin, TTG at that time.    25 minutes spent with patient of which 20 minutes was spent in face-to-face discussion coordination of care    Return visit planned in 6 months, sooner if needed.    Sincerely,    Bushra Rosa MD      "

## 2018-01-26 ENCOUNTER — OFFICE VISIT (OUTPATIENT)
Dept: OPHTHALMOLOGY | Facility: CLINIC | Age: 38
End: 2018-01-26
Payer: COMMERCIAL

## 2018-01-26 DIAGNOSIS — B88.0 INFESTATION BY DEMODEX: ICD-10-CM

## 2018-01-26 DIAGNOSIS — H52.13 MYOPIA, BILATERAL: ICD-10-CM

## 2018-01-26 DIAGNOSIS — E10.9 CONTROLLED DIABETES MELLITUS TYPE 1 WITHOUT COMPLICATIONS (H): ICD-10-CM

## 2018-01-26 DIAGNOSIS — E10.9 TYPE 1 DIABETES MELLITUS WITHOUT RETINOPATHY (H): Primary | ICD-10-CM

## 2018-01-26 LAB
ALBUMIN SERPL-MCNC: 3.9 G/DL (ref 3.4–5)
ANION GAP SERPL CALCULATED.3IONS-SCNC: 5 MMOL/L (ref 3–14)
BUN SERPL-MCNC: 13 MG/DL (ref 7–30)
CALCIUM SERPL-MCNC: 8.6 MG/DL (ref 8.5–10.1)
CHLORIDE SERPL-SCNC: 104 MMOL/L (ref 94–109)
CHOLEST SERPL-MCNC: 118 MG/DL
CO2 SERPL-SCNC: 30 MMOL/L (ref 20–32)
CREAT SERPL-MCNC: 0.79 MG/DL (ref 0.66–1.25)
CREAT UR-MCNC: 27 MG/DL
GFR SERPL CREATININE-BSD FRML MDRD: >90 ML/MIN/1.7M2
GLUCOSE SERPL-MCNC: 94 MG/DL (ref 70–99)
HDLC SERPL-MCNC: 58 MG/DL
LDLC SERPL CALC-MCNC: 52 MG/DL
MICROALBUMIN UR-MCNC: <5 MG/L
MICROALBUMIN/CREAT UR: NORMAL MG/G CR (ref 0–17)
NONHDLC SERPL-MCNC: 60 MG/DL
POTASSIUM SERPL-SCNC: 4 MMOL/L (ref 3.4–5.3)
SODIUM SERPL-SCNC: 140 MMOL/L (ref 133–144)
TRIGL SERPL-MCNC: 41 MG/DL
TSH SERPL DL<=0.005 MIU/L-ACNC: 0.89 MU/L (ref 0.4–4)
TTG IGA SER-ACNC: <1 U/ML
TTG IGG SER-ACNC: <1 U/ML

## 2018-01-26 ASSESSMENT — SLIT LAMP EXAM - LIDS
COMMENTS: COLLARETTES
COMMENTS: COLLARETTES

## 2018-01-26 ASSESSMENT — VISUAL ACUITY
OS_PH_SC: 20/20-2
OS_SC+: -1
OD_SC: J1+
OS_SC: 20/40
OD_PH_SC: 20/20-2
OD_SC: 20/50
OS_SC: J1+
OD_SC+: -2
METHOD: SNELLEN - LINEAR

## 2018-01-26 ASSESSMENT — CONF VISUAL FIELD
OS_NORMAL: 1
METHOD: COUNTING FINGERS
OD_NORMAL: 1

## 2018-01-26 ASSESSMENT — REFRACTION_MANIFEST
OS_CYLINDER: SPHERE
OS_SPHERE: -0.75
OD_CYLINDER: SPHERE
OD_SPHERE: -1.00

## 2018-01-26 ASSESSMENT — TONOMETRY
OD_IOP_MMHG: 14
IOP_METHOD: TONOPEN
OS_IOP_MMHG: 13

## 2018-01-26 ASSESSMENT — EXTERNAL EXAM - LEFT EYE: OS_EXAM: NORMAL

## 2018-01-26 ASSESSMENT — CUP TO DISC RATIO
OS_RATIO: 0.2
OD_RATIO: 0.2

## 2018-01-26 ASSESSMENT — EXTERNAL EXAM - RIGHT EYE: OD_EXAM: NORMAL

## 2018-01-26 NOTE — PROGRESS NOTES
Assessment/Plan  (E10.9) Type 1 diabetes mellitus without retinopathy (H)  (primary encounter diagnosis)  Comment: No retinopathy OU  Plan:  Educated patient on clinical findings and the importance of continued management with primary care physician. Continue management as directed and return to clinic in 1 year for dilated exam, or sooner, as needed.    (H52.13) Myopia, bilateral [H52.13]  Comment: Myopia OU  Plan: REFRACTION [50069]         Dispensed spectacle prescription for full time wear. Educated patient on possibility of adaptation period, if symptoms do not improve return to clinic for further testing.    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 Monitor annually.    Return to clinic in 1 year for comprehensive eye exam.    Complete documentation of historical and exam elements from today's encounter can  be found in the full encounter summary report (not reduplicated in this progress  note). I personally obtained the chief complaint(s) and history of present illness. I  confirmed and edited as necessary the review of systems, past medical/surgical  history, family history, social history, and examination findings as documented by  others; and I examined the patient myself. I personally reviewed the relevant tests,  images, and reports as documented above. I formulated and edited as necessary the  assessment and plan and discussed the findings and management plan with the  patient and family.    Brodie Cheek, ILA, FAAO

## 2018-01-26 NOTE — NURSING NOTE
Chief Complaints and History of Present Illnesses   Patient presents with     Follow Up For     Type 1 diabetes mellitus without retinopathy (H)      HPI    Last Eye Exam:  1/17/17   Affected eye(s):  Both   Symptoms:        Unknown duration    Frequency:  Constant       Do you have eye pain now?:  No      Comments:  Jose is here today for his annual diabetic eye exam.  He says his distance vision is not quite as good as last visit. He did not buy new glasses last year    Last A1C was 5.7 last week  BS today 159    Keny Ortez COT 8:27 AM January 26, 2018

## 2018-01-26 NOTE — LETTER
Patient:  Joseph O Thoen  :   1980  MRN:     [id number]        Mr.Joseph O Thoen  [address] [facility] [address]        2018    Dear Jose    Here are your results which are within normal limits.  Let's continue with the plan as discussed.    If you have any questions, please feel free to contact my nurse at [phone] select option #3 for triage nurse  or  option #1 for scheduling related questions.    Regards    Bushra Rosa MD      Resulted Orders   Basic metabolic panel   Result Value Ref Range    Sodium 140 133 - 144 mmol/L    Potassium 4.0 3.4 - 5.3 mmol/L    Chloride 104 94 - 109 mmol/L    Carbon Dioxide 30 20 - 32 mmol/L    Anion Gap 5 3 - 14 mmol/L    Glucose 94 70 - 99 mg/dL    Urea Nitrogen 13 7 - 30 mg/dL    Creatinine 0.79 0.66 - 1.25 mg/dL    GFR Estimate >90 >60 mL/min/1.7m2      Comment:      Non  GFR Calc    GFR Estimate If Black >90 >60 mL/min/1.7m2      Comment:       GFR Calc    Calcium 8.6 8.5 - 10.1 mg/dL   25 Hydroxyvitamin D2 and D3   Result Value Ref Range    25 OH Vit D2 <5 ug/L    25 OH Vit D3 32 ug/L    25 OH Vit D total <37 20 - 75 ug/L      Comment:      Season, race, dietary intake, and treatment affect the concentration of   25-hydroxy-Vitamin D. Values may decrease during winter months and increase   during summer months. Values 20-29 ug/L may indicate Vitamin D insufficiency   and values <20 ug/L may indicate Vitamin D deficiency.  This test was developed and its performance characteristics determined by the   [facility],  [facility]. It has   not been cleared or approved by the FDA. The laboratory is regulated under   CLIA as qualified to perform high-complexity testing. This test is used for   clinical purposes. It should not be regarded as investigational or for   research.     Albumin level   Result Value Ref Range    Albumin 3.9 3.4 - 5.0 g/dL   TSH   Result Value Ref  Range    TSH 0.89 0.40 - 4.00 mU/L   Lipid Profile   Result Value Ref Range    Cholesterol 118 <200 mg/dL    Triglycerides 41 <150 mg/dL    HDL Cholesterol 58 >39 mg/dL    LDL Cholesterol Calculated 52 <100 mg/dL      Comment:      Desirable:       <100 mg/dl    Non HDL Cholesterol 60 <130 mg/dL   Albumin Random Urine Quantitative with Creat Ratio   Result Value Ref Range    Creatinine Urine 27 mg/dL    Albumin Urine mg/L <5 mg/L    Albumin Urine mg/g Cr Unable to calculate due to low value 0 - 17 mg/g Cr   Tissue transglutaminase rylee IgA and IgG   Result Value Ref Range    Tissue Transglutaminase Antibody IgA <1 <7 U/mL      Comment:      Negative  The tTG-IgA assay has limited utility for patients with decreased levels of   IgA. Screening for celiac disease should include IgA testing to rule out   selective IgA deficiency and to guide selection and interpretation of   serological testing. tTG-IgG testing may be positive in celiac disease   patients with IgA deficiency.      Tissue Transglutaminase Rylee IgG <1 <7 U/mL      Comment:      Negative       Lab

## 2018-01-26 NOTE — PROGRESS NOTES
Diabetes Self Management Training:   Medtronic 670G Insulin Pump Follow Up.    SUBJECTIVE:  Patient presents to follow up 670G insulin pump start and to start Auto Mode, related to Type 1 diabetes accompanied by self  OBJECTIVE:    Patient is checking blood sugar: 5 times daily     ASSESSMENT / INTERVENTION:  Patient Currently in Manual Mode:    Manual Mode Settings:      Basal to Bolus Ratio:  37 % to 63%   Avg Daily Carbs (g)  287  Average TDD insulin 38  Average BG (mg/dl) 124  /  5 BG Readings /day  ACTIVE INSULIN TIME:  4 hours  (changed in prep for Auto Mode to 3.5 hours today)  BLOOD GLUCOSE TARGET and times:  12   AM (midnight): 120 - 120  BASAL RATES and times:  12   AM (midnight): 0.575 units/hour    3     AM: 0.625 units/hour   10   AM: 0.575 units/hour   6    PM:  0.625 units/hour   11   PM: 0.575 units/hour     Corection Factor (Sensitivity) and times:  12    AM: 60 mg/dL  6      AM: 50 mg/dL  9:30    PM:  60 mg/dL    CARB RATIO and times:  12   AM (midnight): 13 (changed from multiple ICRs to this one today)    Weekly Review Report:         Assessment and Progress Report:       Meal Bolus Wizard Report:                Infusion set: Medtronic Quick Set  Frequency of Infusion Set Changes:  Every 3 days  Problems/Concerns:  No particular problems.     EDUCATION PROVIDED:   Discussed the differences between Auto Mode, Safe Basal and Manual Mode and which circumstances will prompt the pump to exit Auto Mode to Safe Basal Mode and from Safe Basal Mode to Manual Mode.   Had the patient practice getting into Auto Mode, exit Auto Mode and access the Auto Mode Readiness Checklist.    Reviewed some tips in managing auto mode which include:  Bolus 15 to 20 minutes before meals  Respond to all alerts and alarms promptly  When the pump suggests a correction bolus, accept the bolus, unless planning some intense physical activity in the next hour or so.    Explained and demonstrated the Temp Target feature to be used  with physical activity or need to have blood glucoses higher than usual.     When treating lows, use about a third of the amount you would normally treat with (5 to 10 grams, rather than 15-30 grams)  and recheck actual BG to determine response to treatment, rather than looking at the CGM screen, which will be quite slow to catch up and may lead one to over-treat the low.      Discussed some tips for calibrating the sensor, and in general how to lessen frustration with alarms from the sensor.    Discussed how to use the Temp Target feature for increased activity, etc.     Education materials provided to patient:   Tips for Auto Mode  Guardian 3 Sensor Tips  Guide to Auto Mode Alarms/Troubleshooting:      Discussed Linking [employer] account:  Account successfully linked.  Login is:  TBD  Password is TBD  Yung agrees to upload in a week to review report and make necessary adjustments.     FOLLOW-UP:  Request Upload of pump in one week so we can evaluate whether or not the ICR or Active Insulin Time needs to be adjusted.     Time Spent: 60  Visit Type: Individual

## 2018-01-26 NOTE — LETTER
January 26, 2018          Your patient, Joseph O Thoen, was examined in the Ophthalmology Clinic today for a diabetic evaluation.     Examination of the right eye today shows: No diabetic retinopathy.    Examination of the left eye today shows: No diabetic retinopathy.    The condition of the eyes today is: stable.    Treatment recommended: Monitor.    I recommend follow-up examination in: 1 year.    I have encouraged the patient to continue working with you to maintain tight control of blood glucose and blood pressure.   Thank you for allowing us to participate in the care of this patient.     Sincerely,    Brodie Cheek, ILA, FAAO

## 2018-01-26 NOTE — MR AVS SNAPSHOT
After Visit Summary   1/26/2018    Joseph O Thoen    MRN: [id number]           Patient Information     Date Of Birth          1980        Visit Information        Provider Department      1/26/2018 8:20 AM Brodie Cheek OD M [facility] Ophthalmology        Today's Diagnoses     Type 1 diabetes mellitus without retinopathy (H)    -  1    Myopia, bilateral [H52.13]        Infestation by Demodex           Follow-ups after your visit        Follow-up notes from your care team     Return in about 1 year (around 1/26/2019) for Comprehensive Eye Exam.      Your next 10 appointments already scheduled     Jul 20, 2018  8:30 AM CDT   (Arrive by 8:15 AM)   RETURN DIABETES with MD KARLEY Amado [facility] Endocrinology ([facility])    [address] [place] [id number] [phone]              Who to contact     Please call your clinic at [phone] to:    Ask questions about your health    Make or cancel appointments    Discuss your medicines    Learn about your test results    Speak to your doctor   If you have compliments or concerns about an experience at your clinic, or if you wish to file a complaint, please contact [facility] Patient Relations at [phone] or email us at [email or web address]         Additional Information About Your Visit        MyChart Information     [employer] gives you secure access to your electronic health record. If you see a primary care provider, you can also send messages to your care team and make appointments. If you have questions, please call your primary care clinic.  If you do not have a primary care provider, please call [phone] and they will assist you.      Starteed is an electronic gateway that provides easy, online access to your medical records. With [employer], you can request a clinic appointment, read your test results, renew a prescription or communicate with your  care team.     To access your existing account, please contact your [facility] or call [phone] for assistance.        Care EveryWhere ID     This is your Care EveryWhere ID. This could be used by other organizations to access your [facility] medical records  [id number]         Blood Pressure from Last 3 Encounters:   01/19/18 145/82   07/14/17 144/79   01/31/17 131/84    Weight from Last 3 Encounters:   01/19/18 67.6 kg (149 lb 1.6 oz)   07/14/17 66.4 kg (146 lb 4.8 oz)   01/31/17 65.2 kg (143 lb 11.2 oz)              We Performed the Following     REFRACTION [[id number]]        Primary Care Provider Office Phone # Fax #    Neo Owens [phone]       [address] [facility] [place] [address] [facility] [place] [address]  [facility] [address]        Equal Access to Services     TRUDI VANN : Hadii aad ku hadasho Soelena, waaxda luqadaha, qaybta kaalmada adeegyada, edmar wolf . So [facility] [phone].    ATENCIÓN: Si habla español, tiene a altman disposición servicios gratuitos de asistencia lingüística. Llame al [phone].    We comply with applicable federal civil rights laws and [place] laws. We do not discriminate on the basis of race, color, national origin, age, disability, sex, sexual orientation, or gender identity.            Thank you!     Thank you for choosing [facility] OPHTHALMOLOGY  for your care. Our goal is always to provide you with excellent care. Hearing back from our patients is one way we can continue to improve our services. Please take a few minutes to complete the written survey that you may receive in the mail after your visit with us. Thank you!             Your Updated Medication List - Protect others around you: Learn how to safely use, store and throw away your medicines at www.disposemymeds.org.          This list is accurate as of 1/26/18  2:11 PM.  Always use your most recent med list.                   Brand  Name Dispense Instructions for use Diagnosis    acetone (Urine) test Strp     50 strip    1 each by In Vitro route as needed.    Diabetes mellitus type 1, controlled (H)       blood glucose monitoring test strip    STAS CONTOUR    200 strip    Needs these test strips because this is the only meter that works with his pump.  Will need to test 6 times daily    Controlled diabetes mellitus type 1 without complications (H)       Blood Pressure Monitor Kit     1 kit    Take bp 2 times a week or PRN problems.  Dx DM    Diabetes mellitus type 1, controlled (H)       insulin aspart 100 UNITS/ML injection    NovoLOG VIAL    50 mL    Pt uses about 50 units per day in the pump    Controlled diabetes mellitus type 1 without complications (H)       insulin glargine 100 UNIT/ML injection    LANTUS    3 mL    Inject 16 Units Subcutaneous At Bedtime To provide basal insulin If pump fails    Controlled diabetes mellitus type 1 without complications (H)       lisinopril 5 MG tablet    PRINIVIL/ZESTRIL    270 tablet    Take 3 tablet (15 mg) daily    Controlled diabetes mellitus type 1 without complications (H)       MICROLET LANCETS Misc     200 each    1 each 6 times daily    Controlled diabetes mellitus type 1 without complications (H)

## 2018-01-30 LAB
DEPRECATED CALCIDIOL+CALCIFEROL SERPL-MC: <37 UG/L (ref 20–75)
VITAMIN D2 SERPL-MCNC: <5 UG/L
VITAMIN D3 SERPL-MCNC: 32 UG/L

## 2018-01-30 NOTE — PROGRESS NOTES
The  results which are within normal limits. Will continue with plan as discussed. Pt informed.    Bushra Rosa MD  1/30/2018  4:09 PM

## 2018-07-20 ENCOUNTER — OFFICE VISIT (OUTPATIENT)
Dept: ENDOCRINOLOGY | Facility: CLINIC | Age: 38
End: 2018-07-20
Payer: COMMERCIAL

## 2018-07-20 VITALS
SYSTOLIC BLOOD PRESSURE: 115 MMHG | HEIGHT: 70 IN | DIASTOLIC BLOOD PRESSURE: 76 MMHG | BODY MASS INDEX: 20.76 KG/M2 | WEIGHT: 145 LBS | HEART RATE: 65 BPM

## 2018-07-20 DIAGNOSIS — E10.9 CONTROLLED DIABETES MELLITUS TYPE 1 WITHOUT COMPLICATIONS (H): Primary | ICD-10-CM

## 2018-07-20 LAB — HBA1C MFR BLD: 5.9 % (ref 4.3–6)

## 2018-07-20 RX ORDER — FLASH GLUCOSE SENSOR
1 KIT MISCELLANEOUS PRN
Qty: 1 DEVICE | Refills: 1 | Status: SHIPPED | OUTPATIENT
Start: 2018-07-20 | End: 2019-01-11

## 2018-07-20 RX ORDER — FLASH GLUCOSE SENSOR
KIT MISCELLANEOUS
Qty: 3 EACH | Refills: 11 | Status: SHIPPED | OUTPATIENT
Start: 2018-07-20 | End: 2019-01-11

## 2018-07-20 ASSESSMENT — PAIN SCALES - GENERAL: PAINLEVEL: NO PAIN (0)

## 2018-07-20 NOTE — MR AVS SNAPSHOT
"              After Visit Summary   7/20/2018    Joseph O Thoen    MRN: [id number]           Patient Information     Date Of Birth          1980        Visit Information        Provider Department      7/20/2018 8:30 AM Bushra Rosa [facility]        Today's Diagnoses     Controlled diabetes mellitus type 1 without complications (H)    -  1      Care Instructions    Consider freestyle anuel CGMS          Follow-ups after your visit        Follow-up notes from your care team     Return in about 6 months (around 1/20/2019).      Who to contact     Please call your clinic at [phone] to:    Ask questions about your health    Make or cancel appointments    Discuss your medicines    Learn about your test results    Speak to your doctor            Additional Information About Your Visit        Swivlhart Information     [employer] gives you secure access to your electronic health record. If you see a primary care provider, you can also send messages to your care team and make appointments. If you have questions, please call your primary care clinic.  If you do not have a primary care provider, please call [phone] and they will assist you.      [employer] is an electronic gateway that provides easy, online access to your medical records. With [employer], you can request a clinic appointment, read your test results, renew a prescription or communicate with your care team.     To access your existing account, please contact your [facility] or call [phone] for assistance.        Care EveryWhere ID     This is your Care EveryWhere ID. This could be used by other organizations to access your [facility] medical records  [id number]        Your Vitals Were     Pulse Height BMI (Body Mass Index)             65 1.778 m (5' 10\") 20.81 kg/m2          Blood Pressure from Last 3 Encounters:   07/20/18 115/76   01/19/18 145/82   07/14/17 144/79    Weight from Last 3 Encounters: "   07/20/18 65.8 kg (145 lb)   01/19/18 67.6 kg (149 lb 1.6 oz)   07/14/17 66.4 kg (146 lb 4.8 oz)              We Performed the Following     Hemoglobin A1c POCT          Today's Medication Changes          These changes are accurate as of 7/20/18 10:48 AM.  If you have any questions, ask your nurse or doctor.               Start taking these medicines.        Dose/Directions    continuous blood glucose monitoring sensor   Started by:  Bushra Rosa MD        For use with Freestyle Troy Flash  for continuous monitioring of blood glucose levels. Replace sensor every 10 days.   Quantity:  3 each   Refills:  11       FREESTYLE TROY READER Shama   Used for:  Controlled diabetes mellitus type 1 without complications (H)   Started by:  Bushra Rosa MD        Dose:  1 applicator   1 applicator as needed   Quantity:  1 Device   Refills:  1       glucagon 1 MG kit   Commonly known as:  GLUCAGON EMERGENCY   Started by:  Bushra Rosa MD        Dose:  1 mg   Inject 1 mg into the muscle once for 1 dose   Quantity:  1 mg   Refills:  0            Where to get your medicines      These medications were sent to [facility] [address] [facility]  [address] [facility] [address] [address]    Hours:  TRANSPLANT PHONE NUMBER [phone] Phone:  [phone]     glucagon 1 MG kit    insulin aspart 100 UNITS/ML injection         Some of these will need a paper prescription and others can be bought over the counter.  Ask your nurse if you have questions.     Bring a paper prescription for each of these medications     continuous blood glucose monitoring sensor    FREESTYLE TROY READER Shama    insulin glargine 100 UNIT/ML injection                Primary Care Provider Office Phone # Fax #    Neo Owens [phone]       [address] [place] [address]  [facility] [address]        Equal Access to Services     TRUDI VANN AH: Halina chandra  ruth Nieves, wajamada lutheoadaha, qamumtazta kadonovan andujar, edmar inderin hayaan klebermary esthelasharon laamilcarjeferson mahin. So [facility] [phone].    ATENCIÓN: Si habla español, tiene a altman disposición servicios gratuitos de asistencia lingüística. Leyda al [phone].    We comply with applicable federal civil rights laws and [place] laws. We do not discriminate on the basis of race, color, national origin, age, disability, sex, sexual orientation, or gender identity.            Thank you!     Thank you for choosing [facility] ENDOCRINOLOGY  for your care. Our goal is always to provide you with excellent care. Hearing back from our patients is one way we can continue to improve our services. Please take a few minutes to complete the written survey that you may receive in the mail after your visit with us. Thank you!             Your Updated Medication List - Protect others around you: Learn how to safely use, store and throw away your medicines at www.disposemymeds.org.          This list is accurate as of 7/20/18 10:48 AM.  Always use your most recent med list.                   Brand Name Dispense Instructions for use Diagnosis    acetone (Urine) test Strp     50 strip    1 each by In Vitro route as needed.    Diabetes mellitus type 1, controlled (H)       blood glucose monitoring test strip    STAS CONTOUR    200 strip    Needs these test strips because this is the only meter that works with his pump.  Will need to test 6 times daily    Controlled diabetes mellitus type 1 without complications (H)       Blood Pressure Monitor Kit     1 kit    Take bp 2 times a week or PRN problems.  Dx DM    Diabetes mellitus type 1, controlled (H)       continuous blood glucose monitoring sensor     3 each    For use with Freestyle Troy Flash  for continuous monitioring of blood glucose levels. Replace sensor every 10 days.        FREESTYLE TROY READER Shama     1 Device    1 applicator as needed    Controlled diabetes mellitus type 1  without complications (H)       glucagon 1 MG kit    GLUCAGON EMERGENCY    1 mg    Inject 1 mg into the muscle once for 1 dose        insulin aspart 100 UNITS/ML injection    NovoLOG VIAL    50 mL    Pt uses about 50 units per day in the pump    Controlled diabetes mellitus type 1 without complications (H)       insulin glargine 100 UNIT/ML injection    LANTUS    3 mL    Inject 16 Units Subcutaneous At Bedtime To provide basal insulin If pump fails    Controlled diabetes mellitus type 1 without complications (H)       lisinopril 5 MG tablet    PRINIVIL/ZESTRIL    270 tablet    Take 3 tablet (15 mg) daily    Controlled diabetes mellitus type 1 without complications (H)       MICROLET LANCETS Misc     200 each    1 each 6 times daily    Controlled diabetes mellitus type 1 without complications (H)

## 2018-07-20 NOTE — LETTER
7/20/2018       RE: Joseph O Thoen  [address] [facility] [address]     Dear Colleague,    Thank you for referring your patient, Joseph O Thoen, to the [facility] ENDOCRINOLOGY at [facility]. Please see a copy of my visit note below.    [facility]  Endocrinology  Bushra Rosa MD  07/20/2018      Chief Complaint:   Diabetes    History of Present Illness:   Joseph O Thoen is a 38 year old male with a history of controlled type 1 diabetes mellitus and hypertension, who presents alone for evaluation of diabetes follow    #1 Type 1 diabetes mellitus: The patient was initially seen in May of 2011. He had a 30 pound weight loss with polyuria, polydipsia, and serum glucose of 466. His May 2011 hemoglobin A1c was 13.5. His anion gap of 17. His C-peptide was 0.8 with a glucose of 318. His TSH was normal. His urine was negative for microalbumin. His DEMARCO antibodies were negative. He was initially tried on metformin but his sugars did not improve so he was started on Lantus and Novolog. His evaluation in September of 2011 was significant for negative anti-islet and anti-insulin antibodies. His August 2011 hemoglobin A1c was 5.8.September 2011 hemoglobin A1c of 5.2. March 2012 hemoglobin A1c of 4.9. In July 2012, the patient started a Medtronic Paradigm pump. August 2013 evaluation showed low C-peptide at 0.2, concurrent glucose of 80, and negative DEMARCO Antibodies. January 2014 hemoglobin A1c of 6.1. July 2014 hemoglobin A1C is 6.2. In 2014, the patient added CGMS to his pump. January 2015 hemoglobin A1c of 5.9. The patient has met with diabetes education and has adjusted his CGMS.  July 2015 hemoglobin A1c of 6.2.  January 2017 hemoglobin A1c of 6.2. July 2017 hemoglobin A1c of 6.0. January 2018 hemoglobin A1c of 5.7. Patient started on closed-loop artificial pump in January 2018.     Interval history: He reports that he has not been using his CGMS as often as he believes that he  "should be. He believes that he is auto-mode about 25% of the time (pump reports 16%) . He does note some difficulty with the pump working consistently. He does not wear a sensor about 75% of the time. He did experience two separate hypoglycemic events, one on 07/03 and another on 07/10. He did feel both of these lows. Today, the patient's HbA1c is 5.9%.     #2 Hypertension: The patient has a history of mild hypertension.  Reports home blood pressures generally \"< than 140s\" .  He is been on lisinopril 10 mg daily since roughly 2015.    Interval history: Appears well controlled on presentation today.     Blood Glucose Monitoring:  We reviewed glucometer and CGM data together.  Reviewing his results, he tends to have hyperglycemia overnight when he takes a snack.  Overall, his blood sugars generally range between 100-120 he does have some hypoglycemia when he stopped his insulin.  If anything, his blood sugars a little bit high after lunch in the 180s.    Current Insulin Pump Settings:  Type of Pump: [employer] Paradigm pump  BASAL RATES and times:  12   AM (midnight): 0.575 units/hour   3:30    AM: 0.625 units/hour   10    AM: 0.575 units/hour   6    PM: 0.625 units/hour   11        PM: 0.575 units/hour   Carb ratio:   CARB RATIO and times:  12      AM (midnight):  1 unit per 13 g carbs    1 unit per 50 above goal of 100 during the day, and 1 unit per 60 above goal 120 from 9 PM to 6 AM    Corection Factor (Sensitivity) and times:  6      AM: 50 mg/dL  9:30    PM: 60 mg/dL  Target BG:   BLOOD GLUCOSE TARGET and times:  12   AM (midnight): 120   Blood glucose target: 120 mg/dL    Active insulin time: 3:30    Low alert: 60 mg.dL  High alert: 250 mg/dL   Maximum bolus: 12.0 units     Diabetes monitoring and complications:  CAD: No  Last eye exam results: 01/26/2018  Last dental exam: Not discussed at this time  Microalbuminuria: Unable to calculate on 01/26/2018 due to low value   HTN: Yes, currently taking 5 mg " "Lisinopril TID  On Statin: No  On Aspirin: No  Depression: Not discussed at this time  Erectile dysfunction: Not discussed at this time    Review of Systems:   Pertinent items are noted in HPI.  All other systems are negative.    Active Medications:      insulin aspart (NOVOLOG VIAL) 100 UNITS/ML injection, Pt uses about 50 units per day in the pump, Disp: 50 mL, Rfl: 3     insulin glargine (LANTUS) 100 UNIT/ML injection, Inject 16 Units Subcutaneous At Bedtime To provide basal insulin If pump fails, Disp: 3 mL, Rfl: 0     lisinopril (PRINIVIL/ZESTRIL) 5 MG tablet, Take 3 tablet (15 mg) daily, Disp: 270 tablet, Rfl: 1     Allergies:   Benadryl [altaryl]      Past Medical History:  Controlled type 1 diabetes mellitus   Hypertension      Past Surgical History:  The patient does not have any pertinent past surgical history.     Family History:   Thyroid disease   Hypertension       Social History:   The patient has a significant other and a 5-month old baby girl. He is a nonsmoker and does consume alcohol.     Physical Exam:   /76  Pulse 65  Ht 1.778 m (5' 10\")  Wt 65.8 kg (145 lb)  BMI 20.81 kg/m2     Wt Readings from Last 10 Encounters:   07/20/18 65.8 kg (145 lb)   01/19/18 67.6 kg (149 lb 1.6 oz)   07/14/17 66.4 kg (146 lb 4.8 oz)   01/31/17 65.2 kg (143 lb 11.2 oz)   01/13/17 65.8 kg (145 lb)   08/08/16 66.7 kg (147 lb)   01/29/16 66.2 kg (146 lb)   07/24/15 67.1 kg (148 lb)   01/12/15 70.3 kg (155 lb)   07/11/14 65.8 kg (145 lb)        GENERAL APPEARANCE: Alert and no distress  NECK: No lymphadenopathy appreciated  Eyes: No obvious retinopathy noted  Thyroid: No obvious nodules palpated   CV: RRR without M/R/G  Lungs: CTA bilaterally  Abdomen: Soft, Nontender, non distended, positive bowel sounds   Neuro: normal reflexes, no focal deficits  Diabetic foot: Intact to monofilament bilaterally.   Skin: No infection in feet   Mood: Normal   Lymph: neg in neck and supraclavicular area      Data:  Lab Results "   Component Value Date     01/26/2018    POTASSIUM 4.0 01/26/2018    CHLORIDE 104 01/26/2018    CO2 30 01/26/2018    ANIONGAP 5 01/26/2018    GLC 94 01/26/2018    BUN 13 01/26/2018    CR 0.79 01/26/2018    LUH 8.6 01/26/2018     Lab Results   Component Value Date    GFRESTIMATED >90 01/26/2018    GFRESTIMATED >90  Non  GFR Calc   01/13/2017    GFRESTIMATED >90  Non  GFR Calc   08/08/2016    GFRESTBLACK >90 01/26/2018    GFRESTBLACK >90   GFR Calc   01/13/2017    GFRESTBLACK >90   GFR Calc   08/08/2016      Lab Results   Component Value Date    MICROL <5 01/26/2018    UMALCR Unable to calculate due to low value 01/26/2018        Lab Results   Component Value Date    A1C 5.9 08/28/2013    A1C 6.1 (A) 01/18/2013    A1C 5.5 09/14/2012    A1C 4.9 03/02/2012    A1C 5.2 11/03/2011    HEMOGLOBINA1 5.9 07/20/2018    HEMOGLOBINA1 5.7 01/19/2018    HEMOGLOBINA1 6.2 (A) 01/13/2017    HEMOGLOBINA1 5.7 08/08/2016    HEMOGLOBINA1 5.9 01/29/2016     Lab Results   Component Value Date    CPEPT 0.2 (L) 08/28/2013    GADAB  08/28/2013     <5.0  Reference range: 0.0 to 5.0  Unit: IU/mL  (Note)  INTERPRETIVE INFORMATION:  Glutamic Acid Decarboxylase  Antibody    A value greater than 5.0 IU/mL is considered positive for  Glutamic Acid Decarboxylase Antibody.  Performed by [facility],  [address] [place] [id number] [phone]  [email or web address], Dieter Garzon MD, Lab. Director    ISCAB  09/02/2011     <1:4  Reference range: <1:4  (Note)  TEST INFORMATION: Islet Cell Ab, IgG    Islet cell antibodies (ICAs) are associated with type 1  diabetes (TID), an autoimmune endocrine disorder. These  antibodies may be present in individuals years before the  onset of clinical symptoms. To calculate  Juvenile Diabetes Foundation (JDF) units: multiply the  titer x 5 (1:8  8 x 5 = 40 JDF Units).  Performed by [facility],  [address], [place] [facility] [place] [address]  "[phone]  www.[employer], Chantel Connors MD, Lab. Director       Lab Results   Component Value Date    CHOL 118 01/26/2018    CHOL 135 01/13/2017    TRIG 41 01/26/2018    TRIG 38 01/13/2017    HDL 58 01/26/2018    HDL 64 01/13/2017    LDL 52 01/26/2018    LDL 63 01/13/2017    NHDL 60 01/26/2018    NHDL 70 01/13/2017       Assessment and Plan:  #1 Type 1 diabetes mellitus  July 2018 hemoglobin A1c outstanding at 5.9.  Encourage patient to use  \"auto mode\" more frequently as this will help reduce hypoglycemia.  Alternatives to the Medtronic  CGMS discussed if he does not wear his Medtronic sensor.  This will include the Dexcom and troy pro.     Overall, I would like the patient to be more cognizant of his low BG levels. Would like for him to continue to consider discussed sensors. Prescription provided for troy CGMS for pt to consider.  No changes were made to his pump settings at this time.     - Hemoglobin A1c POCT  - glucagon (GLUCAGON EMERGENCY) 1 MG kit; Inject 1 mg into the muscle once for 1 dose  Dispense: 1 mg; Refill: 0  - continuous blood glucose monitoring (FREESTYLE TROY) sensor; For use with Freestyle Troy Flash  for continuous monitoring of blood glucose levels. Replace sensor every 10 days.  Dispense: 3 each; Refill: 11  - Continuous Blood Gluc  (FREESTYLE TROY READER) SUZIE; 1 applicator as needed  Dispense: 1 Device; Refill: 1  - insulin glargine (LANTUS SOLOSTAR) 100 UNIT/ML pen; Inject 16 Units Subcutaneous At Bedtime To provide basal insulin If pump fails  Dispense: 3 mL; Refill: 0  - insulin aspart (NOVOLOG VIAL) 100 UNITS/ML injection; Pt uses about 50 units per day in the pump  Dispense: 50 mL; Refill: 3    #2 Hypertension:  Well controlled at this time. No adjustments were made.       Follow-up: Return in about 6 months (around 1/20/2019).     >50% of 30 minute visit spent in face to face counseling, education and coordination of care related to options for better " glycemic control as well as preventing, detecting, and treating hypoglycemia.         Scribe Disclosure:  I, Roberta Maurice, am serving as a scribe to document services personally performed by Bushra Rosa MD at this visit, based upon the provider's statements to me. All documentation has been reviewed by the aforementioned provider prior to being entered into the official medical record.     Portions of this medical record were completed by a scribe. UPON MY REVIEW AND AUTHENTICATION BY ELECTRONIC SIGNATURE, this confirms (a) I performed the applicable clinical services, and (b) the record is accurate.        Again, thank you for allowing me to participate in the care of your patient.      Sincerely,    Bushra Rosa MD

## 2018-07-20 NOTE — PROGRESS NOTES
[facility]  Endocrinology  Bushra Rosa MD  07/20/2018      Chief Complaint:   Diabetes    History of Present Illness:   Joseph O Thoen is a 38 year old male with a history of controlled type 1 diabetes mellitus and hypertension, who presents alone for evaluation of diabetes follow    #1 Type 1 diabetes mellitus: The patient was initially seen in May of 2011. He had a 30 pound weight loss with polyuria, polydipsia, and serum glucose of 466. His May 2011 hemoglobin A1c was 13.5. His anion gap of 17. His C-peptide was 0.8 with a glucose of 318. His TSH was normal. His urine was negative for microalbumin. His DEMARCO antibodies were negative. He was initially tried on metformin but his sugars did not improve so he was started on Lantus and Novolog. His evaluation in September of 2011 was significant for negative anti-islet and anti-insulin antibodies. His August 2011 hemoglobin A1c was 5.8.September 2011 hemoglobin A1c of 5.2. March 2012 hemoglobin A1c of 4.9. In July 2012, the patient started a Medtronic Paradigm pump. August 2013 evaluation showed low C-peptide at 0.2, concurrent glucose of 80, and negative DEMARCO Antibodies. January 2014 hemoglobin A1c of 6.1. July 2014 hemoglobin A1C is 6.2. In 2014, the patient added CGMS to his pump. January 2015 hemoglobin A1c of 5.9. The patient has met with diabetes education and has adjusted his CGMS.  July 2015 hemoglobin A1c of 6.2.  January 2017 hemoglobin A1c of 6.2. July 2017 hemoglobin A1c of 6.0. January 2018 hemoglobin A1c of 5.7. Patient started on closed-loop artificial pump in January 2018.     Interval history: He reports that he has not been using his CGMS as often as he believes that he should be. He believes that he is auto-mode about 25% of the time (pump reports 16%) . He does note some difficulty with the pump working consistently. He does not wear a sensor about 75% of the time. He did experience two separate hypoglycemic events, one on 07/03 and another on  "07/10. He did feel both of these lows. Today, the patient's HbA1c is 5.9%.     #2 Hypertension: The patient has a history of mild hypertension.  Reports home blood pressures generally \"< than 140s\" .  He is been on lisinopril 10 mg daily since roughly 2015.    Interval history: Appears well controlled on presentation today.     Blood Glucose Monitoring:  We reviewed glucometer and CGM data together.  Reviewing his results, he tends to have hyperglycemia overnight when he takes a snack.  Overall, his blood sugars generally range between 100-120 he does have some hypoglycemia when he stopped his insulin.  If anything, his blood sugars a little bit high after lunch in the 180s.    Current Insulin Pump Settings:  Type of Pump: Medtronic Paradigm pump  BASAL RATES and times:  12   AM (midnight): 0.575 units/hour   3:30    AM: 0.625 units/hour   10    AM: 0.575 units/hour   6    PM: 0.625 units/hour   11        PM: 0.575 units/hour   Carb ratio:   CARB RATIO and times:  12      AM (midnight):  1 unit per 13 g carbs    1 unit per 50 above goal of 100 during the day, and 1 unit per 60 above goal 120 from 9 PM to 6 AM    Corection Factor (Sensitivity) and times:  6      AM: 50 mg/dL  9:30    PM: 60 mg/dL  Target BG:   BLOOD GLUCOSE TARGET and times:  12   AM (midnight): 120   Blood glucose target: 120 mg/dL    Active insulin time: 3:30    Low alert: 60 mg.dL  High alert: 250 mg/dL   Maximum bolus: 12.0 units     Diabetes monitoring and complications:  CAD: No  Last eye exam results: 01/26/2018  Last dental exam: Not discussed at this time  Microalbuminuria: Unable to calculate on 01/26/2018 due to low value   HTN: Yes, currently taking 5 mg Lisinopril TID  On Statin: No  On Aspirin: No  Depression: Not discussed at this time  Erectile dysfunction: Not discussed at this time    Review of Systems:   Pertinent items are noted in HPI.  All other systems are negative.    Active Medications:      insulin aspart (NOVOLOG VIAL) 100 " "UNITS/ML injection, Pt uses about 50 units per day in the pump, Disp: 50 mL, Rfl: 3     insulin glargine (LANTUS) 100 UNIT/ML injection, Inject 16 Units Subcutaneous At Bedtime To provide basal insulin If pump fails, Disp: 3 mL, Rfl: 0     lisinopril (PRINIVIL/ZESTRIL) 5 MG tablet, Take 3 tablet (15 mg) daily, Disp: 270 tablet, Rfl: 1     Allergies:   Benadryl [altaryl]      Past Medical History:  Controlled type 1 diabetes mellitus   Hypertension      Past Surgical History:  The patient does not have any pertinent past surgical history.     Family History:   Thyroid disease   Hypertension       Social History:   The patient has a significant other and a 5-month old baby girl. He is a nonsmoker and does consume alcohol.     Physical Exam:   /76  Pulse 65  Ht 1.778 m (5' 10\")  Wt 65.8 kg (145 lb)  BMI 20.81 kg/m2     Wt Readings from Last 10 Encounters:   07/20/18 65.8 kg (145 lb)   01/19/18 67.6 kg (149 lb 1.6 oz)   07/14/17 66.4 kg (146 lb 4.8 oz)   01/31/17 65.2 kg (143 lb 11.2 oz)   01/13/17 65.8 kg (145 lb)   08/08/16 66.7 kg (147 lb)   01/29/16 66.2 kg (146 lb)   07/24/15 67.1 kg (148 lb)   01/12/15 70.3 kg (155 lb)   07/11/14 65.8 kg (145 lb)        GENERAL APPEARANCE: Alert and no distress  NECK: No lymphadenopathy appreciated  Eyes: No obvious retinopathy noted  Thyroid: No obvious nodules palpated   CV: RRR without M/R/G  Lungs: CTA bilaterally  Abdomen: Soft, Nontender, non distended, positive bowel sounds   Neuro: normal reflexes, no focal deficits  Diabetic foot: Intact to monofilament bilaterally.   Skin: No infection in feet   Mood: Normal   Lymph: neg in neck and supraclavicular area      Data:  Lab Results   Component Value Date     01/26/2018    POTASSIUM 4.0 01/26/2018    CHLORIDE 104 01/26/2018    CO2 30 01/26/2018    ANIONGAP 5 01/26/2018    GLC 94 01/26/2018    BUN 13 01/26/2018    CR 0.79 01/26/2018    LUH 8.6 01/26/2018     Lab Results   Component Value Date    GFRESTIMATED >90 " 01/26/2018    GFRESTIMATED >90  Non  GFR Calc   01/13/2017    GFRESTIMATED >90  Non  GFR Calc   08/08/2016    GFRESTBLACK >90 01/26/2018    GFRESTBLACK >90   GFR Calc   01/13/2017    GFRESTBLACK >90   GFR Calc   08/08/2016      Lab Results   Component Value Date    MICROL <5 01/26/2018    UMALCR Unable to calculate due to low value 01/26/2018        Lab Results   Component Value Date    A1C 5.9 08/28/2013    A1C 6.1 (A) 01/18/2013    A1C 5.5 09/14/2012    A1C 4.9 03/02/2012    A1C 5.2 11/03/2011    HEMOGLOBINA1 5.9 07/20/2018    HEMOGLOBINA1 5.7 01/19/2018    HEMOGLOBINA1 6.2 (A) 01/13/2017    HEMOGLOBINA1 5.7 08/08/2016    HEMOGLOBINA1 5.9 01/29/2016     Lab Results   Component Value Date    CPEPT 0.2 (L) 08/28/2013    GADAB  08/28/2013     <5.0  Reference range: 0.0 to 5.0  Unit: IU/mL  (Note)  INTERPRETIVE INFORMATION:  Glutamic Acid Decarboxylase  Antibody    A value greater than 5.0 IU/mL is considered positive for  Glutamic Acid Decarboxylase Antibody.  Performed by [facility],  [address] [place] [address] [phone]  [email or web address], Dieter Garzon MD, Lab. Director    ISCAB  09/02/2011     <1:4  Reference range: <1:4  (Note)  TEST INFORMATION: Islet Cell Ab, IgG    Islet cell antibodies (ICAs) are associated with type 1  diabetes (TID), an autoimmune endocrine disorder. These  antibodies may be present in individuals years before the  onset of clinical symptoms. To calculate  Juvenile Diabetes Foundation (JDF) units: multiply the  titer x 5 (1:8  8 x 5 = 40 JDF Units).  Performed by [facility],  [address] [place] [address] [phone]  [email or web address], Chantel Connors MD, Lab. Director       Lab Results   Component Value Date    CHOL 118 01/26/2018    CHOL 135 01/13/2017    TRIG 41 01/26/2018    TRIG 38 01/13/2017    HDL 58 01/26/2018    HDL 64 01/13/2017    LDL 52 01/26/2018    LDL 63 01/13/2017    NHDL 60 01/26/2018  "   NHDL 70 01/13/2017       Assessment and Plan:  #1 Type 1 diabetes mellitus  July 2018 hemoglobin A1c outstanding at 5.9.  Encourage patient to use  \"auto mode\" more frequently as this will help reduce hypoglycemia.  Alternatives to the Medtronic  CGMS discussed if he does not wear his Medtronic sensor.  This will include the Dexcom and troy pro.     Overall, I would like the patient to be more cognizant of his low BG levels. Would like for him to continue to consider discussed sensors. Prescription provided for troy CGMS for pt to consider.  No changes were made to his pump settings at this time.     - Hemoglobin A1c POCT  - glucagon (GLUCAGON EMERGENCY) 1 MG kit; Inject 1 mg into the muscle once for 1 dose  Dispense: 1 mg; Refill: 0  - continuous blood glucose monitoring (FREESTYLE TROY) sensor; For use with Freestyle Troy Flash  for continuous monitoring of blood glucose levels. Replace sensor every 10 days.  Dispense: 3 each; Refill: 11  - Continuous Blood Gluc  (FREESTYLE TROY READER) SUZIE; 1 applicator as needed  Dispense: 1 Device; Refill: 1  - insulin glargine (LANTUS SOLOSTAR) 100 UNIT/ML pen; Inject 16 Units Subcutaneous At Bedtime To provide basal insulin If pump fails  Dispense: 3 mL; Refill: 0  - insulin aspart (NOVOLOG VIAL) 100 UNITS/ML injection; Pt uses about 50 units per day in the pump  Dispense: 50 mL; Refill: 3    #2 Hypertension:  Well controlled at this time. No adjustments were made.       Follow-up: Return in about 6 months (around 1/20/2019).     >50% of 30 minute visit spent in face to face counseling, education and coordination of care related to options for better glycemic control as well as preventing, detecting, and treating hypoglycemia.         Scribe Disclosure:  I, Roberta Richards, am serving as a scribe to document services personally performed by Bushra Rosa MD at this visit, based upon the provider's statements to me. All documentation has been " reviewed by the aforementioned provider prior to being entered into the official medical record.     Portions of this medical record were completed by a scribe. UPON MY REVIEW AND AUTHENTICATION BY ELECTRONIC SIGNATURE, this confirms (a) I performed the applicable clinical services, and (b) the record is accurate.

## 2018-10-12 DIAGNOSIS — E10.9 CONTROLLED DIABETES MELLITUS TYPE 1 WITHOUT COMPLICATIONS (H): ICD-10-CM

## 2018-10-16 RX ORDER — LISINOPRIL 5 MG/1
TABLET ORAL
Qty: 270 TABLET | Refills: 3 | Status: SHIPPED | OUTPATIENT
Start: 2018-10-16 | End: 2019-05-24

## 2019-01-07 ENCOUNTER — PATIENT OUTREACH (OUTPATIENT)
Dept: CARE COORDINATION | Facility: CLINIC | Age: 39
End: 2019-01-07

## 2019-01-11 ENCOUNTER — OFFICE VISIT (OUTPATIENT)
Dept: ENDOCRINOLOGY | Facility: CLINIC | Age: 39
End: 2019-01-11
Payer: COMMERCIAL

## 2019-01-11 VITALS
BODY MASS INDEX: 20.69 KG/M2 | SYSTOLIC BLOOD PRESSURE: 125 MMHG | HEART RATE: 71 BPM | HEIGHT: 70 IN | DIASTOLIC BLOOD PRESSURE: 78 MMHG | WEIGHT: 144.5 LBS

## 2019-01-11 DIAGNOSIS — E10.9 CONTROLLED DIABETES MELLITUS TYPE 1 WITHOUT COMPLICATIONS (H): Primary | ICD-10-CM

## 2019-01-11 DIAGNOSIS — E10.9 CONTROLLED DIABETES MELLITUS TYPE 1 WITHOUT COMPLICATIONS (H): ICD-10-CM

## 2019-01-11 LAB
ANION GAP SERPL CALCULATED.3IONS-SCNC: 6 MMOL/L (ref 3–14)
BUN SERPL-MCNC: 14 MG/DL (ref 7–30)
CALCIUM SERPL-MCNC: 8.9 MG/DL (ref 8.5–10.1)
CHLORIDE SERPL-SCNC: 101 MMOL/L (ref 94–109)
CHOLEST SERPL-MCNC: 127 MG/DL
CO2 SERPL-SCNC: 30 MMOL/L (ref 20–32)
CREAT SERPL-MCNC: 0.63 MG/DL (ref 0.66–1.25)
CREAT UR-MCNC: 48 MG/DL
GFR SERPL CREATININE-BSD FRML MDRD: >90 ML/MIN/{1.73_M2}
GLUCOSE SERPL-MCNC: 178 MG/DL (ref 70–99)
HDLC SERPL-MCNC: 52 MG/DL
LDLC SERPL CALC-MCNC: 56 MG/DL
MICROALBUMIN UR-MCNC: <5 MG/L
MICROALBUMIN/CREAT UR: NORMAL MG/G CR (ref 0–17)
NONHDLC SERPL-MCNC: 75 MG/DL
POTASSIUM SERPL-SCNC: 3.9 MMOL/L (ref 3.4–5.3)
SODIUM SERPL-SCNC: 137 MMOL/L (ref 133–144)
TRIGL SERPL-MCNC: 98 MG/DL
TSH SERPL DL<=0.005 MIU/L-ACNC: 0.45 MU/L (ref 0.4–4)

## 2019-01-11 ASSESSMENT — PAIN SCALES - GENERAL: PAINLEVEL: NO PAIN (0)

## 2019-01-11 ASSESSMENT — MIFFLIN-ST. JEOR: SCORE: 1576.7

## 2019-01-11 NOTE — LETTER
1/11/2019       RE: Joseph O Thoen  [address] [facility] [address]     Dear Colleague,    Thank you for referring your patient, Joseph O Thoen, to the [facility] ENDOCRINOLOGY at [facility]. Please see a copy of my visit note below.    [facility]  Endocrinology  Bushra Rosa MD  01/11/2019      Chief Complaint:   Diabetes    History of Present Illness:   Joseph O Thoen is a 38 year old male with a history of controlled type 1 diabetes mellitus and hypertension, who presents alone for evaluation of diabetes follow    #1 Type 1 diabetes mellitus: The patient was initially seen in May of 2011. He had a 30 pound weight loss with polyuria, polydipsia, and serum glucose of 466. His May 2011 hemoglobin A1c was 13.5. His anion gap of 17. His C-peptide was 0.8 with a glucose of 318. His TSH was normal. His urine was negative for microalbumin. His DEMARCO antibodies were negative. He was initially tried on metformin but his sugars did not improve so he was started on Lantus and Novolog. His evaluation in September of 2011 was significant for negative anti-islet and anti-insulin antibodies. His August 2011 hemoglobin A1c was 5.8.September 2011 hemoglobin A1c of 5.2. March 2012 hemoglobin A1c of 4.9. In July 2012, the patient started a Medtronic Paradigm pump. August 2013 evaluation showed low C-peptide at 0.2, concurrent glucose of 80, and negative DEMARCO Antibodies. January 2014 hemoglobin A1c of 6.1. July 2014 hemoglobin A1C is 6.2. In 2014, the patient added CGMS to his pump. January 2015 hemoglobin A1c of 5.9. The patient has met with diabetes education and has adjusted his CGMS.  July 2015 hemoglobin A1c of 6.2.  January 2017 hemoglobin A1c of 6.2. July 2017 hemoglobin A1c of 6.0. January 2018 hemoglobin A1c of 5.7. Patient started on closed-loop artificial pump in January 2018.   July 2018 hemoglobin A1c is 5.9.    Interval history: January 2019 hemoglobin A1c is 6.6.  Of note, he  only uses the auto mode perhaps 14% of the time.  He uses the manual mode about 86% of the time.  Average blood sugar is 155.  He reports frustration with the closed-loop process.  Of note, his active insulin time is roughly 3.5 hours which she does not change when he is between manual mode versus auto mode.  He also reports frustration with the sensor.  He reports that his child has been in  and therefore has been frequently sick and attributes his hyperglycemia accordingly.    #2 Hypertension: The patient has a history of mild hypertension.     He has been on lisinopril since 2015.    Interval history: Currently on lisinopril 15 mg daily.    Blood Glucose Monitoring:  Reviewing the CGM S levels, has hyperglycemia particularly during the day from about 1 PM to 5 PM.  He he does not appear to have significant hypoglycemia overnight.  He has she has been checking his blood sugar multiple times (roughly 5-7 times per day) because of his frustration with his sensor.  Current Insulin Pump Settings:  Type of Pump: Speedment Paradigm pump  BASAL RATES and times:  12   AM (midnight): 0.575 units/hour   3:30    AM: 0.650 units/hour   10    AM: 0.6 units/hour   6    PM: 0.65 units/hour   11        PM: 0.575 units/hour   Carb ratio:   CARB RATIO and times:  12      AM (midnight):  1 unit per 1 12 g carbs    1 unit per 50 above goal of 100 during the day, and 1 unit per 60 above goal 120 from 9 PM to 6 AM    Corection Factor (Sensitivity) and times:  6      AM: 50 mg/dL  9:30    PM: 60 mg/dL  Target BG:   BLOOD GLUCOSE TARGET and times:  12   AM (midnight): 120   Blood glucose target: 120 mg/dL    Active insulin time: 3:30    Low alert: 60 mg.dL  High alert: 250 mg/dL   Maximum bolus: 12.0 units     Diabetes monitoring and complications:  CAD: No  Last eye exam results: 01/26/2018  Last dental exam: Not discussed at this time  Microalbuminuria: Unable to calculate on 01/26/2018 due to low value   HTN: Yes, currently  "taking 5 mg Lisinopril TID  On Statin: No  On Aspirin: No  Depression: Not discussed at this time  Erectile dysfunction: Not discussed at this time    Review of Systems:   Pertinent items are noted in HPI.  All other systems are negative.    Active Medications:      insulin aspart (NOVOLOG VIAL) 100 UNITS/ML injection, Pt uses about 50 units per day in the pump, Disp: 50 mL, Rfl: 3     insulin glargine (LANTUS) 100 UNIT/ML injection, Inject 16 Units Subcutaneous At Bedtime To provide basal insulin If pump fails, Disp: 3 mL, Rfl: 0     lisinopril (PRINIVIL/ZESTRIL) 5 MG tablet, Take 3 tablet (15 mg) daily, Disp: 270 tablet, Rfl: 1     Allergies:   Benadryl [altaryl]      Past Medical History:  Controlled type 1 diabetes mellitus   Hypertension      Past Surgical History:  The patient does not have any pertinent past surgical history.     Family History:   Thyroid disease   Hypertension       Social History:    He is a nonsmoker and does consume alcohol.     Physical Exam:   /78   Pulse 71   Ht 1.778 m (5' 10\")   Wt 65.5 kg (144 lb 8 oz)   BMI 20.73 kg/m        Wt Readings from Last 10 Encounters:   01/11/19 65.5 kg (144 lb 8 oz)   07/20/18 65.8 kg (145 lb)   01/19/18 67.6 kg (149 lb 1.6 oz)   07/14/17 66.4 kg (146 lb 4.8 oz)   01/31/17 65.2 kg (143 lb 11.2 oz)   01/13/17 65.8 kg (145 lb)   08/08/16 66.7 kg (147 lb)   01/29/16 66.2 kg (146 lb)   07/24/15 67.1 kg (148 lb)   01/12/15 70.3 kg (155 lb)        GENERAL APPEARANCE: Alert and no distress  NECK: No lymphadenopathy appreciated  Eyes: No obvious retinopathy noted  Thyroid: No obvious nodules palpated   CV: RRR without M/R/G  Lungs: CTA bilaterally  Abdomen: Soft, Nontender, non distended, positive bowel sounds   Neuro: normal reflexes, no focal deficits  Diabetic foot: Intact to monofilament bilaterally.   Skin: No infection in feet   Mood: Normal   Lymph: neg in neck and supraclavicular area      Data:  Orders Only on 01/11/2019   Component Date Value " Ref Range Status     Sodium 01/11/2019 137  133 - 144 mmol/L Final     Potassium 01/11/2019 3.9  3.4 - 5.3 mmol/L Final     Chloride 01/11/2019 101  94 - 109 mmol/L Final     Carbon Dioxide 01/11/2019 30  20 - 32 mmol/L Final     Anion Gap 01/11/2019 6  3 - 14 mmol/L Final     Glucose 01/11/2019 178* 70 - 99 mg/dL Final     Urea Nitrogen 01/11/2019 14  7 - 30 mg/dL Final     Creatinine 01/11/2019 0.63* 0.66 - 1.25 mg/dL Final     GFR Estimate 01/11/2019 >90  >60 mL/min/[1.73_m2] Final    Comment: Non  GFR Calc  Starting 12/18/2018, serum creatinine based estimated GFR (eGFR) will be   calculated using the Chronic Kidney Disease Epidemiology Collaboration   (CKD-EPI) equation.       GFR Estimate If Black 01/11/2019 >90  >60 mL/min/[1.73_m2] Final    Comment:  GFR Calc  Starting 12/18/2018, serum creatinine based estimated GFR (eGFR) will be   calculated using the Chronic Kidney Disease Epidemiology Collaboration   (CKD-EPI) equation.       Calcium 01/11/2019 8.9  8.5 - 10.1 mg/dL Final     TSH 01/11/2019 0.45  0.40 - 4.00 mU/L Final     Creatinine Urine 01/11/2019 48  mg/dL Final     Albumin Urine mg/L 01/11/2019 <5  mg/L Final     Albumin Urine mg/g Cr 01/11/2019 Unable to calculate due to low value  0 - 17 mg/g Cr Final     Cholesterol 01/11/2019 127  <200 mg/dL Final     Triglycerides 01/11/2019 98  <150 mg/dL Final     HDL Cholesterol 01/11/2019 52  >39 mg/dL Final     LDL Cholesterol Calculated 01/11/2019 56  <100 mg/dL Final    Desirable:       <100 mg/dl     Non HDL Cholesterol 01/11/2019 75  <130 mg/dL Final         Assessment and Plan:  #1 Type 1 diabetes mellitus  January 2019 hemoglobin A1c of 6.6.  This is slightly higher than previous.  He reports extreme frustration with his sensor as well as his pump.  He is been only using auto mode at 14% of the time and has not been adjusting his active insulin time while in auto mode.  I think is important that he meet with our  diabetes educator and be instructed on how to minimize the household related to the auto mode of his pump.  I did talk with him about the possibility of the Dex com CGM S and freestyle anuel as alternative options if he feels that his current set up is not viable.    For now, I would not change his pump settings.    Current Insulin Pump Settings:  Type of Pump: Medtronic Paradigm pump  BASAL RATES and times:  12   AM (midnight): 0.575 units/hour   3:30    AM: 0.650 units/hour   10    AM: 0.6 units/hour   6    PM: 0.65 units/hour   11        PM: 0.575 units/hour   Carb ratio:   CARB RATIO and times:  12      AM (midnight):  1 unit per 1 12 g carbs    1 unit per 50 above goal of 100 during the day, and 1 unit per 60 above goal 120 from 9 PM to 6 AM    Corection Factor (Sensitivity) and times:  6      AM: 50 mg/dL  9:30    PM: 60 mg/dL  Target BG:   BLOOD GLUCOSE TARGET and times:  12   AM (midnight): 120   Blood glucose target: 120 mg/dL    Active insulin time: 3:30    Low alert: 60 mg.dL  High alert: 250 mg/dL   Maximum bolus: 12.0 units     We will check vitamin D, basic metabolic panel, urine for microalbumin,, TSH, lipid profile, TTG referral to diabetes education    ADDENDUM: Labs today were normal.    #2 Hypertension:  Well controlled at this time. No adjustments were made.       Follow-up: Return in about 4 months (around 5/11/2019).     >50% of 30 minute visit spent in face to face counseling, education and coordination of care related to options for better glycemic control as well as preventing, detecting, and treating hypoglycemia.       Again, thank you for allowing me to participate in the care of your patient.      Sincerely,    Bushra Rosa MD

## 2019-01-11 NOTE — PROGRESS NOTES
[facility]  Endocrinology  Bushra Rosa MD  01/11/2019      Chief Complaint:   Diabetes    History of Present Illness:   Joseph O Thoen is a 38 year old male with a history of controlled type 1 diabetes mellitus and hypertension, who presents alone for evaluation of diabetes follow    #1 Type 1 diabetes mellitus: The patient was initially seen in May of 2011. He had a 30 pound weight loss with polyuria, polydipsia, and serum glucose of 466. His May 2011 hemoglobin A1c was 13.5. His anion gap of 17. His C-peptide was 0.8 with a glucose of 318. His TSH was normal. His urine was negative for microalbumin. His DEMARCO antibodies were negative. He was initially tried on metformin but his sugars did not improve so he was started on Lantus and Novolog. His evaluation in September of 2011 was significant for negative anti-islet and anti-insulin antibodies. His August 2011 hemoglobin A1c was 5.8.September 2011 hemoglobin A1c of 5.2. March 2012 hemoglobin A1c of 4.9. In July 2012, the patient started a Medtronic Paradigm pump. August 2013 evaluation showed low C-peptide at 0.2, concurrent glucose of 80, and negative DEMARCO Antibodies. January 2014 hemoglobin A1c of 6.1. July 2014 hemoglobin A1C is 6.2. In 2014, the patient added CGMS to his pump. January 2015 hemoglobin A1c of 5.9. The patient has met with diabetes education and has adjusted his CGMS.  July 2015 hemoglobin A1c of 6.2.  January 2017 hemoglobin A1c of 6.2. July 2017 hemoglobin A1c of 6.0. January 2018 hemoglobin A1c of 5.7. Patient started on closed-loop artificial pump in January 2018.   July 2018 hemoglobin A1c is 5.9.    Interval history: January 2019 hemoglobin A1c is 6.6.  Of note, he only uses the auto mode perhaps 14% of the time.  He uses the manual mode about 86% of the time.  Average blood sugar is 155.  He reports frustration with the closed-loop process.  Of note, his active insulin time is roughly 3.5 hours which she does not change when he is between  manual mode versus auto mode.  He also reports frustration with the sensor.  He reports that his child has been in  and therefore has been frequently sick and attributes his hyperglycemia accordingly.    #2 Hypertension: The patient has a history of mild hypertension.    He has been on lisinopril since 2015.    Interval history: Currently on lisinopril 15 mg daily.    Blood Glucose Monitoring:  Reviewing the CGM S levels, has hyperglycemia particularly during the day from about 1 PM to 5 PM.  He he does not appear to have significant hypoglycemia overnight.  He has she has been checking his blood sugar multiple times (roughly 5-7 times per day) because of his frustration with his sensor.  Current Insulin Pump Settings:  Type of Pump: i-marker Paradigm pump  BASAL RATES and times:  12   AM (midnight): 0.575 units/hour   3:30    AM: 0.650 units/hour   10    AM: 0.6 units/hour   6    PM: 0.65 units/hour   11        PM: 0.575 units/hour   Carb ratio:   CARB RATIO and times:  12      AM (midnight):  1 unit per 1 12 g carbs    1 unit per 50 above goal of 100 during the day, and 1 unit per 60 above goal 120 from 9 PM to 6 AM    Corection Factor (Sensitivity) and times:  6      AM: 50 mg/dL  9:30    PM: 60 mg/dL  Target BG:   BLOOD GLUCOSE TARGET and times:  12   AM (midnight): 120   Blood glucose target: 120 mg/dL    Active insulin time: 3:30    Low alert: 60 mg.dL  High alert: 250 mg/dL   Maximum bolus: 12.0 units     Diabetes monitoring and complications:  CAD: No  Last eye exam results: 01/26/2018  Last dental exam: Not discussed at this time  Microalbuminuria: Unable to calculate on 01/26/2018 due to low value   HTN: Yes, currently taking 5 mg Lisinopril TID  On Statin: No  On Aspirin: No  Depression: Not discussed at this time  Erectile dysfunction: Not discussed at this time    Review of Systems:   Pertinent items are noted in HPI.  All other systems are negative.    Active Medications:      insulin aspart  "(NOVOLOG VIAL) 100 UNITS/ML injection, Pt uses about 50 units per day in the pump, Disp: 50 mL, Rfl: 3     insulin glargine (LANTUS) 100 UNIT/ML injection, Inject 16 Units Subcutaneous At Bedtime To provide basal insulin If pump fails, Disp: 3 mL, Rfl: 0     lisinopril (PRINIVIL/ZESTRIL) 5 MG tablet, Take 3 tablet (15 mg) daily, Disp: 270 tablet, Rfl: 1     Allergies:   Benadryl [altaryl]      Past Medical History:  Controlled type 1 diabetes mellitus   Hypertension      Past Surgical History:  The patient does not have any pertinent past surgical history.     Family History:   Thyroid disease   Hypertension       Social History:    He is a nonsmoker and does consume alcohol.     Physical Exam:   /78   Pulse 71   Ht 1.778 m (5' 10\")   Wt 65.5 kg (144 lb 8 oz)   BMI 20.73 kg/m       Wt Readings from Last 10 Encounters:   01/11/19 65.5 kg (144 lb 8 oz)   07/20/18 65.8 kg (145 lb)   01/19/18 67.6 kg (149 lb 1.6 oz)   07/14/17 66.4 kg (146 lb 4.8 oz)   01/31/17 65.2 kg (143 lb 11.2 oz)   01/13/17 65.8 kg (145 lb)   08/08/16 66.7 kg (147 lb)   01/29/16 66.2 kg (146 lb)   07/24/15 67.1 kg (148 lb)   01/12/15 70.3 kg (155 lb)        GENERAL APPEARANCE: Alert and no distress  NECK: No lymphadenopathy appreciated  Eyes: No obvious retinopathy noted  Thyroid: No obvious nodules palpated   CV: RRR without M/R/G  Lungs: CTA bilaterally  Abdomen: Soft, Nontender, non distended, positive bowel sounds   Neuro: normal reflexes, no focal deficits  Diabetic foot: Intact to monofilament bilaterally.   Skin: No infection in feet   Mood: Normal   Lymph: neg in neck and supraclavicular area      Data:  Orders Only on 01/11/2019   Component Date Value Ref Range Status     Sodium 01/11/2019 137  133 - 144 mmol/L Final     Potassium 01/11/2019 3.9  3.4 - 5.3 mmol/L Final     Chloride 01/11/2019 101  94 - 109 mmol/L Final     Carbon Dioxide 01/11/2019 30  20 - 32 mmol/L Final     Anion Gap 01/11/2019 6  3 - 14 mmol/L Final     " Glucose 01/11/2019 178* 70 - 99 mg/dL Final     Urea Nitrogen 01/11/2019 14  7 - 30 mg/dL Final     Creatinine 01/11/2019 0.63* 0.66 - 1.25 mg/dL Final     GFR Estimate 01/11/2019 >90  >60 mL/min/[1.73_m2] Final    Comment: Non  GFR Calc  Starting 12/18/2018, serum creatinine based estimated GFR (eGFR) will be   calculated using the Chronic Kidney Disease Epidemiology Collaboration   (CKD-EPI) equation.       GFR Estimate If Black 01/11/2019 >90  >60 mL/min/[1.73_m2] Final    Comment:  GFR Calc  Starting 12/18/2018, serum creatinine based estimated GFR (eGFR) will be   calculated using the Chronic Kidney Disease Epidemiology Collaboration   (CKD-EPI) equation.       Calcium 01/11/2019 8.9  8.5 - 10.1 mg/dL Final     TSH 01/11/2019 0.45  0.40 - 4.00 mU/L Final     Creatinine Urine 01/11/2019 48  mg/dL Final     Albumin Urine mg/L 01/11/2019 <5  mg/L Final     Albumin Urine mg/g Cr 01/11/2019 Unable to calculate due to low value  0 - 17 mg/g Cr Final     Cholesterol 01/11/2019 127  <200 mg/dL Final     Triglycerides 01/11/2019 98  <150 mg/dL Final     HDL Cholesterol 01/11/2019 52  >39 mg/dL Final     LDL Cholesterol Calculated 01/11/2019 56  <100 mg/dL Final    Desirable:       <100 mg/dl     Non HDL Cholesterol 01/11/2019 75  <130 mg/dL Final         Assessment and Plan:  #1 Type 1 diabetes mellitus  January 2019 hemoglobin A1c of 6.6.  This is slightly higher than previous.  He reports extreme frustration with his sensor as well as his pump.  He is been only using auto mode at 14% of the time and has not been adjusting his active insulin time while in auto mode.  I think is important that he meet with our diabetes educator and be instructed on how to minimize the household related to the auto mode of his pump.  I did talk with him about the possibility of the Dex com CGM S and freestyle anuel as alternative options if he feels that his current set up is not viable.    For now, I  would not change his pump settings.    Current Insulin Pump Settings:  Type of Pump: Medtronic Paradigm pump  BASAL RATES and times:  12   AM (midnight): 0.575 units/hour   3:30    AM: 0.650 units/hour   10    AM: 0.6 units/hour   6    PM: 0.65 units/hour   11        PM: 0.575 units/hour   Carb ratio:   CARB RATIO and times:  12      AM (midnight):  1 unit per 1 12 g carbs    1 unit per 50 above goal of 100 during the day, and 1 unit per 60 above goal 120 from 9 PM to 6 AM    Corection Factor (Sensitivity) and times:  6      AM: 50 mg/dL  9:30    PM: 60 mg/dL  Target BG:   BLOOD GLUCOSE TARGET and times:  12   AM (midnight): 120   Blood glucose target: 120 mg/dL    Active insulin time: 3:30    Low alert: 60 mg.dL  High alert: 250 mg/dL   Maximum bolus: 12.0 units     We will check vitamin D, basic metabolic panel, urine for microalbumin,, TSH, lipid profile, TTG referral to diabetes education    ADDENDUM: Labs today were normal.    #2 Hypertension:  Well controlled at this time. No adjustments were made.       Follow-up: Return in about 4 months (around 5/11/2019).     >50% of 30 minute visit spent in face to face counseling, education and coordination of care related to options for better glycemic control as well as preventing, detecting, and treating hypoglycemia.

## 2019-01-14 LAB
DEPRECATED CALCIDIOL+CALCIFEROL SERPL-MC: <20 UG/L (ref 20–75)
TTG IGA SER-ACNC: <1 U/ML
TTG IGG SER-ACNC: 1 U/ML
VITAMIN D2 SERPL-MCNC: <5 UG/L
VITAMIN D3 SERPL-MCNC: 15 UG/L

## 2019-01-29 ENCOUNTER — ALLIED HEALTH/NURSE VISIT (OUTPATIENT)
Dept: EDUCATION SERVICES | Facility: CLINIC | Age: 39
End: 2019-01-29
Payer: COMMERCIAL

## 2019-01-29 DIAGNOSIS — E10.9 DIABETES MELLITUS TYPE 1, CONTROLLED (H): Primary | ICD-10-CM

## 2019-01-30 NOTE — PROGRESS NOTES
"Diabetes Self-Management Education & Support    Diabetes Education Self Management & Training    SUBJECTIVE/OBJECTIVE:     Cultural Influences/Ethnic Background:  Not  or    Occupation:  [employer] [place]     Purpose of Today's Visit:  Has had the 670G insulin pump since November 16, 2017.  States that since he started the insulin pump he has stopped using Auto mode, because of frustration with repeated requests from the pump for calibration, repeated BG checks.  He got kicked out of Auto mode on numerous occasions and ultimately stopped using it.  He saw Dr. Rosa recently who suggested that he visit with DE and try to figure out ways to lessen the frustration with the pump.      Patient Problem List and Family Medical History reviewed for relevant medical history, current medical status, and diabetes risk factors.    Vitals:  There were no vitals taken for this visit.  Estimated body mass index is 20.73 kg/m  as calculated from the following:    Height as of 1/11/19: 1.778 m (5' 10\").    Weight as of 1/11/19: 65.5 kg (144 lb 8 oz).   Last 3 BP:   BP Readings from Last 3 Encounters:   01/11/19 125/78   07/20/18 115/76   01/19/18 145/82       History   Smoking Status     Never Smoker   Smokeless Tobacco     Never Used       Labs:  Lab Results   Component Value Date    A1C 5.9 08/28/2013     Lab Results   Component Value Date     01/11/2019     Lab Results   Component Value Date    LDL 56 01/11/2019     HDL Cholesterol   Date Value Ref Range Status   01/11/2019 52 >39 mg/dL Final   ]  GFR Estimate   Date Value Ref Range Status   01/11/2019 >90 >60 mL/min/[1.73_m2] Final     Comment:     Non  GFR Calc  Starting 12/18/2018, serum creatinine based estimated GFR (eGFR) will be   calculated using the Chronic Kidney Disease Epidemiology Collaboration   (CKD-EPI) equation.       GFR Estimate If Black   Date Value Ref Range Status   01/11/2019 >90 >60 mL/min/[1.73_m2] Final     " Comment:      GFR Calc  Starting 12/18/2018, serum creatinine based estimated GFR (eGFR) will be   calculated using the Chronic Kidney Disease Epidemiology Collaboration   (CKD-EPI) equation.       A1C:  7/20/2018:  5.9  1/2019   :  6.6    EDUCATION PROVIDED TODAY:      Reviewed the problems that he has been having with using the pump, primarily it sounds as if the sensor and transmitter are the issue.  He reports repeated requests for calibration, blood glucose checks, and then repeats these requests until it tells him to replace the sensor or kicks him out of Auto Mode.      Given information about how to get his current transmitter replaced with an updated transmitter that has been reported to reduce the number of repeated requests by 90%.  His current transmitter is over a year old, so due to be replaced anyway.  [employer]tr[employer] is offering these new transmitters for free, as long as the patient is willing to return the old transmitter.  Also discussed in detail the calibration requirements for the sensor, encouraged him to put himself on a schedule with calibration so that he is calibrating before 2 meals each day, and before bedtime, rather than waiting for the pump to request a calibration.  Discussed how to use the ISIG/BG ratio to determine whether or not it's a good time to calibrate.  He was unaware that when he is out of Auto Mode and in Manual Mode, he needs to go into the sensor settings and turn ON his low glucose suspend, so reviewed how to do that.      Shared with him all of the Medtronic Weekly Tips regarding Auto Mode, which are actually quite helpful.  In addition [employer] is offering some Webinars to help optimize the Auto mode experience.       PLAN:  See Patient Instructions for co-developed, patient-stated behavior change goals.  AVS printed and provided to patient today. See Follow-Up section for recommended follow-up.  Asked him to follow up with getting a new transmitter, and  to upload to [facility] after he has been in Auto mode for a couple of weeks.     Time Spent: 60 minutes  Encounter Type: Individual    Any diabetes medication dose changes were made via the CDE Protocol and Collaborative Practice Agreement with the patient's referring provider. A copy of this encounter was shared with the provider.

## 2019-02-13 ENCOUNTER — OFFICE VISIT (OUTPATIENT)
Dept: OPHTHALMOLOGY | Facility: CLINIC | Age: 39
End: 2019-02-13
Payer: COMMERCIAL

## 2019-02-13 DIAGNOSIS — E10.9 TYPE 1 DIABETES MELLITUS WITHOUT RETINOPATHY (H): Primary | ICD-10-CM

## 2019-02-13 DIAGNOSIS — H52.13 MYOPIA, BILATERAL: ICD-10-CM

## 2019-02-13 DIAGNOSIS — H00.022 HORDEOLUM INTERNUM OF RIGHT LOWER EYELID: ICD-10-CM

## 2019-02-13 DIAGNOSIS — D31.32 CHOROIDAL NEVUS OF LEFT EYE: ICD-10-CM

## 2019-02-13 ASSESSMENT — REFRACTION_MANIFEST
OS_CYLINDER: SPHERE
OS_SPHERE: -0.75
OD_SPHERE: -0.75
OD_CYLINDER: SPHERE

## 2019-02-13 ASSESSMENT — EXTERNAL EXAM - RIGHT EYE: OD_EXAM: NORMAL

## 2019-02-13 ASSESSMENT — CONF VISUAL FIELD
METHOD: COUNTING FINGERS
OD_NORMAL: 1
OS_NORMAL: 1

## 2019-02-13 ASSESSMENT — CUP TO DISC RATIO
OD_RATIO: 0.2
OS_RATIO: 0.2

## 2019-02-13 ASSESSMENT — VISUAL ACUITY
METHOD: SNELLEN - LINEAR
OD_SC: 20/50
OS_SC: 20/50

## 2019-02-13 ASSESSMENT — SLIT LAMP EXAM - LIDS
COMMENTS: LOWER LID HORDEOLUM
COMMENTS: COLLARETTES

## 2019-02-13 ASSESSMENT — TONOMETRY
IOP_METHOD: ICARE
OD_IOP_MMHG: 14
OS_IOP_MMHG: 16

## 2019-02-13 ASSESSMENT — EXTERNAL EXAM - LEFT EYE: OS_EXAM: NORMAL

## 2019-02-13 NOTE — PROGRESS NOTES
History  HPI     Swelling Around Right Eye     In right lower lid.  Associated symptoms include mattering, lid pain, lid swelling and lid redness.  Negative for blurred vision, discharge, bleeding and eye pain.  Treatments tried include warm compresses.  Response to treatment was no improvement.              Comments     Patient notes that Sunday he has a spot on the RLL and has been using warm compresses, but hasn't been going done, tender, denies discharge, VA is good  Last BGL: 160 less than an hour ago  Last A1C: 6.? unsure  Lab Results       Component                Value               Date                       A1C                      5.9                 08/28/2013                 A1C                      6.1                 01/18/2013                 A1C                      5.5                 09/14/2012                 A1C                      4.9                 03/02/2012                 A1C                      5.2                 11/03/2011              Shaniqua Loomis February 13, 2019 3:22 PM            Last edited by Fely Loomis on 2/13/2019  3:22 PM. (History)          Assessment/Plan  (E10.9) Type 1 diabetes mellitus without retinopathy (H)  (primary encounter diagnosis)  Comment: No retinopathy either eye  Plan:  Educated patient on clinical findings and the importance of continued management with primary care physician. Continue management as directed and return to clinic in 1 year for dilated exam, or sooner, as needed.    (H00.022) Hordeolum internum of right lower eyelid  Comment: Minimal improvement with warm compresses, tender to touch  Plan:  Recommended warm compresses 3-4 times each day for ten minutes. If no improvement, referred to oculoplastics in 1 month.    (H52.13) Myopia, bilateral [H52.13]  Comment: Myopia both eyes   Plan: REFRACTION   Dispensed spectacle prescription for as-needed time wear. Educated patient on possibility of adaptation period, if symptoms do not improve return  to clinic for further testing.    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    Return to clinic in 1 month for oculoplastics consult; 1 year for comprehensive eye exam.    Complete documentation of historical and exam elements from today's encounter can  be found in the full encounter summary report (not reduplicated in this progress  note). I personally obtained the chief complaint(s) and history of present illness. I  confirmed and edited as necessary the review of systems, past medical/surgical  history, family history, social history, and examination findings as documented by  others; and I examined the patient myself. I personally reviewed the relevant tests,  images, and reports as documented above. I formulated and edited as necessary the  assessment and plan and discussed the findings and management plan with the  patient and family.    Brodie Cheek OD, FAAO

## 2019-02-13 NOTE — TELEPHONE ENCOUNTER
FUTURE VISIT INFORMATION      FUTURE VISIT INFORMATION:    Date: 03/15/19    Time: 1130am    Location: [facility]  REFERRAL INFORMATION:    Referring provider:  LAMINE ROMERO    Referring providers clinic:  [facility]    Reason for visit/diagnosis  Oculoplastics consult/ Procedure for Stye-    RECORDS REQUESTED FROM:       Clinic name Comments Records Status Imaging Status   [facility] Notes in Epic per visit on 2/13/19 IN EPIC

## 2019-02-13 NOTE — NURSING NOTE
Chief Complaints and History of Present Illnesses   Patient presents with     Swelling Around Right Eye     Chief Complaint(s) and History of Present Illness(es)     Swelling Around Right Eye     Laterality: right lower lid    Associated symptoms: mattering, lid pain, lid swelling and lid redness.  Negative for blurred vision, discharge, bleeding and eye pain    Treatments tried: warm compresses    Response to treatment: no improvement              Comments     Patient notes that Sunday he has a spot on the RLL and has been using warm compresses, but hasn't been going done, tender, denies discharge, VA is good  Last BGL: 160 less than an hour ago  Last A1C: 6.? unsure  Lab Results       Component                Value               Date                       A1C                      5.9                 08/28/2013                 A1C                      6.1                 01/18/2013                 A1C                      5.5                 09/14/2012                 A1C                      4.9                 03/02/2012                 A1C                      5.2                 11/03/2011              Shaniqua Loomis February 13, 2019 3:22 PM

## 2019-02-20 ENCOUNTER — PRE VISIT (OUTPATIENT)
Dept: OPHTHALMOLOGY | Facility: CLINIC | Age: 39
End: 2019-02-20

## 2019-02-20 NOTE — TELEPHONE ENCOUNTER
FUTURE VISIT INFORMATION      FUTURE VISIT INFORMATION:    Date: 3/15/19    Time: 11:30am    Location: [facility]  REFERRAL INFORMATION:    Referring provider:  Adia    Referring providers clinic:  [facility]    Reason for visit/diagnosis  Procedure for Stye    RECORDS REQUESTED FROM:       Clinic name Comments Records Status Imaging Status   [facility] OV with Adia 2/13/19- Stye RLL EPIC

## 2019-03-10 ENCOUNTER — OFFICE VISIT (OUTPATIENT)
Dept: URGENT CARE | Facility: URGENT CARE | Age: 39
End: 2019-03-10
Payer: COMMERCIAL

## 2019-03-10 VITALS
HEIGHT: 70 IN | RESPIRATION RATE: 14 BRPM | WEIGHT: 140 LBS | BODY MASS INDEX: 20.04 KG/M2 | DIASTOLIC BLOOD PRESSURE: 72 MMHG | HEART RATE: 80 BPM | TEMPERATURE: 99.9 F | SYSTOLIC BLOOD PRESSURE: 108 MMHG

## 2019-03-10 DIAGNOSIS — J02.0 STREPTOCOCCAL PHARYNGITIS: Primary | ICD-10-CM

## 2019-03-10 DIAGNOSIS — R07.0 THROAT PAIN: ICD-10-CM

## 2019-03-10 DIAGNOSIS — E10.9 TYPE 1 DIABETES MELLITUS WITHOUT COMPLICATION (H): ICD-10-CM

## 2019-03-10 LAB
DEPRECATED S PYO AG THROAT QL EIA: ABNORMAL
SPECIMEN SOURCE: ABNORMAL

## 2019-03-10 PROCEDURE — 99203 OFFICE O/P NEW LOW 30 MIN: CPT | Performed by: PHYSICIAN ASSISTANT

## 2019-03-10 PROCEDURE — 87880 STREP A ASSAY W/OPTIC: CPT | Performed by: FAMILY MEDICINE

## 2019-03-10 RX ORDER — AMOXICILLIN 500 MG/1
500 CAPSULE ORAL 2 TIMES DAILY
Qty: 20 CAPSULE | Refills: 0 | Status: SHIPPED | OUTPATIENT
Start: 2019-03-10 | End: 2019-03-20

## 2019-03-10 ASSESSMENT — ENCOUNTER SYMPTOMS
SHORTNESS OF BREATH: 0
VOMITING: 0
NAUSEA: 0
SORE THROAT: 1
ABDOMINAL PAIN: 0
MYALGIAS: 1
COUGH: 0
FOCAL WEAKNESS: 0
DIARRHEA: 0
CHILLS: 1
FEVER: 0
HEADACHES: 0

## 2019-03-10 ASSESSMENT — MIFFLIN-ST. JEOR: SCORE: 1551.29

## 2019-03-10 NOTE — PROGRESS NOTES
HPI  March 10, 2019    HPI: Joseph O Thoen is a 39 year old male with hx Type 1 DM who complains of moderate sore throat, fatigue, chills, & myalgias onset last night. Symptoms are constant in duration. No treatments tried. Denies fever, cough, congestion, HA, CP, SOB, abd pain, N/V/D, rash, or any other symptoms. Patient's wife was diagnosed with strep throat today.    A1c was 5.9% on 7/20/18. Pt is on Novolog & Lantus for his DM.    Past Medical History:   Diagnosis Date     Diabetes (H)      HTN (hypertension)      Past Surgical History:   Procedure Laterality Date     NO HISTORY OF SURGERY       Social History     Tobacco Use     Smoking status: Never Smoker     Smokeless tobacco: Never Used   Substance Use Topics     Alcohol use: Yes     Alcohol/week: 0.6 oz     Types: 1 Standard drinks or equivalent per week     Drug use: No     Patient Active Problem List   Diagnosis     Diabetes mellitus type 1, controlled (H)     Family History   Problem Relation Age of Onset     Thyroid Disease Mother 53     Hypertension Mother      Glaucoma No family hx of      Macular Degeneration No family hx of      Amblyopia No family hx of      Retinal detachment No family hx of      Diabetes No family hx of      Coronary Artery Disease No family hx of      Hyperlipidemia No family hx of      Breast Cancer No family hx of      Colon Cancer No family hx of      Prostate Cancer No family hx of         Problem list, Medication list, Allergies, and Medical/Social/Surgical histories reviewed in [facility] and updated as appropriate.      Review of Systems   Constitutional: Positive for chills and malaise/fatigue. Negative for fever.   HENT: Positive for sore throat. Negative for congestion.    Respiratory: Negative for cough and shortness of breath.    Cardiovascular: Negative for chest pain.   Gastrointestinal: Negative for abdominal pain, diarrhea, nausea and vomiting.   Musculoskeletal: Positive for myalgias.   Skin: Negative for rash.  "  Neurological: Negative for focal weakness and headaches.   All other systems reviewed and are negative.        Physical Exam   Constitutional: He is oriented to person, place, and time.   HENT:   Head: Normocephalic and atraumatic.   Right Ear: Tympanic membrane and external ear normal.   Left Ear: Tympanic membrane and external ear normal.   Mouth/Throat: Uvula is midline and mucous membranes are normal. Posterior oropharyngeal erythema present. No oropharyngeal exudate or tonsillar abscesses.   Cardiovascular: Normal rate, regular rhythm and normal heart sounds.   Pulmonary/Chest: Effort normal and breath sounds normal.   Musculoskeletal: Normal range of motion.   Neurological: He is alert and oriented to person, place, and time.   Skin: Skin is warm and dry.   Nursing note and vitals reviewed.    Vital Signs  /72   Pulse 80   Temp 99.9  F (37.7  C) (Oral)   Resp 14   Ht 1.778 m (5' 10\")   Wt 63.5 kg (140 lb)   BMI 20.09 kg/m       Diagnostic Test Results:  Results for orders placed or performed in visit on 03/10/19 (from the past 24 hour(s))   Strep, Rapid Screen   Result Value Ref Range    Specimen Description Throat     Rapid Strep A Screen (A)      POSITIVE: Group A Streptococcal antigen detected by immunoassay.       ASSESSMENT/PLAN      ICD-10-CM    1. Streptococcal pharyngitis J02.0 amoxicillin (AMOXIL) 500 MG capsule   2. Throat pain R07.0 Strep, Rapid Screen   3. Type 1 diabetes mellitus without complication (H) E10.9       Strep positive- no s/sx PTA. Rx amoxicillin.  DM well controlled- continue insulin.    I have discussed any lab or imaging results, the patient's diagnosis, and my plan of treatment with the patient and/or family. Patient is aware to come back in if with worsening symptoms or if no relief despite treatment plan.  Patient voiced understanding and had no further questions.       Follow Up: Return in about 3 days (around 3/13/2019) for Follow up w/ PCP if not better.    Joyce" DIMAS Pastor, PA-C  [facility]

## 2019-03-15 ENCOUNTER — PRE VISIT (OUTPATIENT)
Dept: OPHTHALMOLOGY | Facility: CLINIC | Age: 39
End: 2019-03-15

## 2019-03-15 ENCOUNTER — OFFICE VISIT (OUTPATIENT)
Dept: OPHTHALMOLOGY | Facility: CLINIC | Age: 39
End: 2019-03-15
Payer: COMMERCIAL

## 2019-03-15 DIAGNOSIS — H00.12 CHALAZION RIGHT LOWER EYELID: Primary | ICD-10-CM

## 2019-03-15 RX ORDER — NEOMYCIN SULFATE, POLYMYXIN B SULFATE, AND DEXAMETHASONE 3.5; 10000; 1 MG/G; [USP'U]/G; MG/G
0.5 OINTMENT OPHTHALMIC 2 TIMES DAILY
Qty: 3.5 G | Refills: 0 | Status: SHIPPED | OUTPATIENT
Start: 2019-03-15 | End: 2019-03-25

## 2019-03-15 ASSESSMENT — SLIT LAMP EXAM - LIDS: COMMENTS: COLLARETTES

## 2019-03-15 ASSESSMENT — CONF VISUAL FIELD
OD_NORMAL: 1
OS_NORMAL: 1
METHOD: COUNTING FINGERS

## 2019-03-15 ASSESSMENT — VISUAL ACUITY
METHOD: SNELLEN - LINEAR
OD_SC: 20/40
OS_SC: 20/30-

## 2019-03-15 ASSESSMENT — EXTERNAL EXAM - LEFT EYE: OS_EXAM: NORMAL

## 2019-03-15 ASSESSMENT — EXTERNAL EXAM - RIGHT EYE: OD_EXAM: NORMAL

## 2019-03-15 NOTE — PROGRESS NOTES
Assessment    Joseph O Thoen is a 39 year old male with the following diagnoses:   1. Chalazion right lower eyelid         Pt has noted RLL stye for a couple months, now less inflamed/swollen  - has tried warm compresses without improvement    Exam w/ small nodular area of erythema RLL, loss of few lashes  - possibly resolving chalazion, but will keep close watch    PLAN:  Discussed options, including excision w/ biopsy today, or treat medically w/ warm compresses and maxitrol  - pt opts for warm compresses and Maxitrol, pt preferred not to have procedure today  - Maxitrol 2-3x per day RIGHT x 10days    F/u in 4weeks, if no improvement, pt understands need for possible biopsy      Mike Jon MD, ANDREW  Oculofacial Plastics and Orbit Surgery Fellow    Attestation:  I personally obtained the chief complaint(s) and history of present illness.  I confirmed and edited as necessary the elements in the note as documented by others; and I examined the patient myself.  I formulated and edited as necessary the assessment and plan and discussed the findings and management plan with the patient and/or family.   -Mike Jon MD, ANDREW  Oculofacial Plastics and Orbit Surgery Fellow      Attending Physician Attestation:  I did not see this patient on Mar 15, 2019, but I have reviewed the relevant history, examination findings, assessment, and plan as documented by others.  I have confirmed and edited as necessary the assessment and plan and agree with this note.  - Brodie Linton., MD 3:48 PM 3/18/2019

## 2019-03-20 ENCOUNTER — PRE VISIT (OUTPATIENT)
Dept: OPHTHALMOLOGY | Facility: CLINIC | Age: 39
End: 2019-03-20

## 2019-03-20 NOTE — TELEPHONE ENCOUNTER
FUTURE VISIT INFORMATION      FUTURE VISIT INFORMATION:    Date: 4/8/19    Time: 2:30pm    Location: [facility]  REFERRAL INFORMATION:    Referring provider:  [facility]    Referring providers clinic:  [facility]    Reason for visit/diagnosis  Return in about 4 weeks (around 4/12/2019) for chalazion/bx    RECORDS REQUESTED FROM:       Clinic name Comments Records Status Imaging Status   [facility] Procedure 3/15/19 EPIC

## 2019-04-08 ENCOUNTER — OFFICE VISIT (OUTPATIENT)
Dept: OPHTHALMOLOGY | Facility: CLINIC | Age: 39
End: 2019-04-08
Payer: COMMERCIAL

## 2019-04-08 DIAGNOSIS — H00.19 CHALAZION: ICD-10-CM

## 2019-04-08 DIAGNOSIS — H00.19 CHALAZION: Primary | ICD-10-CM

## 2019-04-08 RX ORDER — ERYTHROMYCIN 5 MG/G
OINTMENT OPHTHALMIC ONCE
Status: COMPLETED | OUTPATIENT
Start: 2019-04-08 | End: 2019-04-08

## 2019-04-08 RX ADMIN — ERYTHROMYCIN: 5 OINTMENT OPHTHALMIC at 15:30

## 2019-04-08 ASSESSMENT — PATIENT HEALTH QUESTIONNAIRE - PHQ9
SUM OF ALL RESPONSES TO PHQ QUESTIONS 1-9: 0
SUM OF ALL RESPONSES TO PHQ QUESTIONS 1-9: 0

## 2019-04-08 NOTE — PROGRESS NOTES
Chief Complaints and History of Present Illnesses   Patient presents with     Chalazion Follow Up     Chief Complaint(s) and History of Present Illness(es)     Chalazion Follow Up     In right lower lid.                   Assessment & Plan     Joseph O Thoen is a 39 year old male with the following diagnoses:   1. Chalazion         Excise Right lower lid chalazion             Attending Physician Attestation:  I have seen and examined this patient.  I have confirmed and edited as necessary the chief complaint(s), history of present illness, review of systems, relevant history, and examination findings as documented by others.  I have personally reviewed the relevant tests, images, and reports as documented above.  I have confirmed and edited as necessary the assessment and plan and agree with this note.    - Brodie Linton MD 3:35 PM 4/8/2019    Today with Joseph O Thoen, I reviewed the indications, risks, benefits, and alternatives of the proposed surgical procedure including, but not limited to, failure obtain the desired result  and need for additional surgery, bleeding, infection, loss of vision, loss of the eye, and the remote possibility of permanent damage to any organ system or death with the use of anesthesia.  I provided multiple opportunities for the questions, answered all questions to the best of my ability, and confirmed that my answers and my discussion were understood.     - Brodie Linton MD 3:35 PM 4/8/2019

## 2019-04-08 NOTE — PATIENT INSTRUCTIONS
Brodie Linton M.D.    POST OPERATIVE INSTRUCTIONS   OFFICE EYELID SURGERY      1. Ice pack immediately after surgery. Alternate ten minutes on and ten minutes off for the first 24 - 48 hours, while in a reclined position with two pillows under your head while awake.     2. You can use Tylenol extra strength for pain as directed on the bottle.     3. Sleep with two pillows under your head for the first night following surgery.     4.  If bandaged, remove the dressing 24 hours after surgery.     5. Use ointment along the incision both morning and evening until your return visit. If you get ointment in your eye, it will blur your vision slightly.     6. Avoid aspirin or anti-inflammatory medications such as Advil or Motrin for one week following your surgery unless otherwise directed.     7. Avoid strenuous activity or straining for the first week after surgery.     8. Bathing and showers are OK but to facilitate healing, do not wash or apply water to the sutured areas.     9. Small amounts of bright red blood normally stain the bandages. Some blurring of vision can be expected due to drainage and ointment in the eyes.     10. If your eyes swell shut, you cannot establish vision in the operated eye, or the pain is not reasonably controlled with medication you must contact the eye clinic immediately.     11. If you should have a problem after normal office hours, you can reach the ophthalmologist on call at [phone]. If for some reason no one can be reached, proceed to the nearest emergency room for evaluation and treatment.

## 2019-04-09 ASSESSMENT — PATIENT HEALTH QUESTIONNAIRE - PHQ9: SUM OF ALL RESPONSES TO PHQ QUESTIONS 1-9: 0

## 2019-05-24 ENCOUNTER — OFFICE VISIT (OUTPATIENT)
Dept: ENDOCRINOLOGY | Facility: CLINIC | Age: 39
End: 2019-05-24
Payer: COMMERCIAL

## 2019-05-24 VITALS
BODY MASS INDEX: 21.47 KG/M2 | HEART RATE: 71 BPM | SYSTOLIC BLOOD PRESSURE: 125 MMHG | HEIGHT: 70 IN | WEIGHT: 150 LBS | DIASTOLIC BLOOD PRESSURE: 79 MMHG

## 2019-05-24 DIAGNOSIS — E10.9 CONTROLLED DIABETES MELLITUS TYPE 1 WITHOUT COMPLICATIONS (H): Primary | ICD-10-CM

## 2019-05-24 DIAGNOSIS — E10.9 CONTROLLED DIABETES MELLITUS TYPE 1 WITHOUT COMPLICATIONS (H): ICD-10-CM

## 2019-05-24 LAB
ANION GAP SERPL CALCULATED.3IONS-SCNC: 5 MMOL/L (ref 3–14)
BUN SERPL-MCNC: 13 MG/DL (ref 7–30)
CALCIUM SERPL-MCNC: 9.7 MG/DL (ref 8.5–10.1)
CHLORIDE SERPL-SCNC: 104 MMOL/L (ref 94–109)
CO2 SERPL-SCNC: 31 MMOL/L (ref 20–32)
CREAT SERPL-MCNC: 0.64 MG/DL (ref 0.66–1.25)
GFR SERPL CREATININE-BSD FRML MDRD: >90 ML/MIN/{1.73_M2}
GLUCOSE SERPL-MCNC: 65 MG/DL (ref 70–99)
HBA1C MFR BLD: 6.4 % (ref 4.3–6)
POTASSIUM SERPL-SCNC: 4.4 MMOL/L (ref 3.4–5.3)
SODIUM SERPL-SCNC: 139 MMOL/L (ref 133–144)

## 2019-05-24 RX ORDER — FLASH GLUCOSE SENSOR
1 KIT MISCELLANEOUS PRN
Qty: 8 EACH | Refills: 3 | Status: SHIPPED | OUTPATIENT
Start: 2019-05-24 | End: 2021-03-26

## 2019-05-24 RX ORDER — FLASH GLUCOSE SCANNING READER
1 EACH MISCELLANEOUS PRN
Qty: 1 DEVICE | Refills: 1 | Status: SHIPPED | OUTPATIENT
Start: 2019-05-24 | End: 2021-03-26

## 2019-05-24 RX ORDER — LISINOPRIL 5 MG/1
TABLET ORAL
Qty: 270 TABLET | Refills: 3 | Status: SHIPPED | OUTPATIENT
Start: 2019-05-24 | End: 2020-07-20

## 2019-05-24 ASSESSMENT — PAIN SCALES - GENERAL: PAINLEVEL: NO PAIN (0)

## 2019-05-24 ASSESSMENT — MIFFLIN-ST. JEOR: SCORE: 1596.65

## 2019-05-24 NOTE — PROGRESS NOTES
[facility]  Endocrinology  Bushra Rosa MD  05/24/2019      Chief Complaint:   Diabetes    History of Present Illness:   Joseph O Thoen is a 39 year old male who presents for follow up of diabetes.    #1 Type 1 diabetes mellitus   The patient was initially seen in May 2011 at which time he had a 30 pound weight loss with polyuria, polydipsia, serum glucose of 466, and Hemoglobin A1c of 13.5%.  His C-peptide was 0.8 with a glucose of 318.  DEMARCO antibodies were negative.  He was initially started on Metformin however his blood sugars did not improve therefore he was switched to Lantus and Novolog.  Blood work in September 2011 was significant for negative anti-islet and anti-insulin antibodies.  Since starting insulin, his diabetes has been well controlled with Hemoglobin A1c values all <7%.  He started on a Medtronic insulin pump in July 2012 and switched to the closed-loop system in January 2018.  July 2018 Hemoglobin A1c was 5.9%.  January 2019 was 6.6% at which time he was having issues with automode, only using it about 14% of the time.    Interval history:  He saw Dionne Rendon to discuss his pump and sensor in January 2019.  He appreciated her insights however has since stopped using the sensor when he ran out of his CGM supplies. He has not restarted automode since he does not have his CGM. However, he is ok with not restarting CGM since the automode is bothersome for him with the alarms.  He did not get around to ordering new sensors until recently and is expecting these to be delivered sometime next week.  He has liked not having his pump alarm so much.  He is going to be doing [place] next month and figured he should have a sensor for this.    May 2019 Hemoglobin A1c is 6.4%.  He does note hypoglycemia approximately twice per week.  Most significant episode was after he went on a longer bike ride and did not bring a snack with him.  He did decrease his basal rate (by less than 50%) while exercising  but still went low.  Usually his blood sugars are at goal however occasionally he will wake up high for no apparent reason.  For exam he woke up at 300 this morning even after taking a correction for slightly high sugar before bed.          Blood Glucose Monitoring:  We reviewed glucometer data together, checks 4-6 times per day with higher blood sugars (in 200-300s) with meals.  Average blood sugar 181 with standard deviation of 63     Current Insulin Pump Settings:  Type of Pump: Medtronic Minimed: Model 670G    BASAL RATES and times:  12   AM (midnight): 0.575 units/hour    3:30     AM: 0.65 units/hour   11   PM: 0.575 units/hour     Carb ratio:   CARB RATIO and times:  12   AM (midnight): 12    Basal 38%  :  Bolus 62%    Correction Factor (Sensitivity) and times:  12   AM (midnight): 60 mg/dL  6     AM: 50 mg/dL  9:30    PM: 60 mg/dL    Target BG Ranges:   BLOOD GLUCOSE TARGET and times:  12   AM (midnight): 120    Amount of Time Insulin is Active:  3.5 hrs    Diabetes monitoring and complications:  CAD: No  Last eye exam results: 2/13/19, no diabetic retinopathy   Last dental exam: not discussed  Microalbuminuria: negative 1/11/2019  HTN: Yes: on Lisinopril   On Statin: No  On Aspirin: No    #2 Low vitamin D  His Vitamin D level was low in January (<20)..  He did take a supplement for a short time however has since stopped.    Review of Systems:   Pertinent items are noted in HPI.  All other systems are negative.    Active Medications:      glucagon (GLUCAGON EMERGENCY) 1 MG kit, Inject 1 mg into the muscle once for 1 dose, Disp: 1 mg, Rfl: 0     insulin aspart (NOVOLOG VIAL) 100 UNITS/ML injection, Pt uses about 50 units per day in the pump, Disp: 50 mL, Rfl: 3     insulin glargine (LANTUS SOLOSTAR) 100 UNIT/ML pen, Inject 16 Units Subcutaneous At Bedtime To provide basal insulin If pump fails, Disp: 3 mL, Rfl: 0     lisinopril (PRINIVIL/ZESTRIL) 5 MG tablet, Take 3 tablet (15 mg) daily, Disp: 270 tablet, Rfl:  "3     Allergies:   Benadryl [altaryl]      Past Medical History:  Type 1 diabetes mellitus  Hypertension      Past Surgical History:  No previous surgeries    Family History:   Thyroid disease - mother  Hypertension - mother       Social History:   Presents to clinic alone.  Tobacco Use: No previous or current tobacco use.   Alcohol Use: 1 drink per week     Physical Exam:   /79   Pulse 71   Ht 1.778 m (5' 10\")   Wt 68 kg (150 lb)   BMI 21.52 kg/m       Wt Readings from Last 4 Encounters:   05/24/19 68 kg (150 lb)   03/10/19 63.5 kg (140 lb)   01/11/19 65.5 kg (144 lb 8 oz)   07/20/18 65.8 kg (145 lb)       GENERAL APPEARANCE: Alert and no distress  NECK: No lymphadenopathy appreciated  Eyes: No obvious retinopathy noted  Thyroid: No obvious nodules palpated   CV: RRR without M/R/G  Lungs: CTA bilaterally  Abdomen: Soft, Nontender, non distended, positive bowel sounds   Neuro: no focal deficits  Skin: No infection in feet  Mood: Normal   Lymph: neg in neck and supraclavicular area     Diabetic foot exam: normal DP and PT pulses, no trophic changes or ulcerative lesions and normal monofilament exam       Data:  Lab Results   Component Value Date     01/11/2019    POTASSIUM 3.9 01/11/2019    CHLORIDE 101 01/11/2019    CO2 30 01/11/2019    ANIONGAP 6 01/11/2019     (H) 01/11/2019    BUN 14 01/11/2019    CR 0.63 (L) 01/11/2019    LUH 8.9 01/11/2019     Lab Results   Component Value Date    GFRESTIMATED >90 01/11/2019    GFRESTIMATED >90 01/26/2018    GFRESTIMATED >90  Non  GFR Calc   01/13/2017    GFRESTBLACK >90 01/11/2019    GFRESTBLACK >90 01/26/2018    GFRESTBLACK >90   GFR Calc   01/13/2017      Lab Results   Component Value Date    MICROL <5 01/11/2019    UMALCR Unable to calculate due to low value 01/11/2019      Lab Results   Component Value Date    A1C 5.9 08/28/2013    A1C 6.1 (A) 01/18/2013    A1C 5.5 09/14/2012    A1C 4.9 03/02/2012    A1C 5.2 11/03/2011 "    HEMOGLOBINA1 6.4 (A) 05/24/2019    HEMOGLOBINA1 5.9 07/20/2018    HEMOGLOBINA1 5.7 01/19/2018    HEMOGLOBINA1 6.2 (A) 01/13/2017    HEMOGLOBINA1 5.7 08/08/2016     Lab Results   Component Value Date    CPEPT 0.2 (L) 08/28/2013    GADAB  08/28/2013     <5.0  Reference range: 0.0 to 5.0  Unit: IU/mL  (Note)  INTERPRETIVE INFORMATION:  Glutamic Acid Decarboxylase  Antibody    A value greater than 5.0 IU/mL is considered positive for  Glutamic Acid Decarboxylase Antibody.  Performed by [facility],  [address] [facility] [place] [address] [phone]  [email or web address], Dieter Garzon MD, Lab. Director    ISCAB  09/02/2011     <1:4  Reference range: <1:4  (Note)  TEST INFORMATION: Islet Cell Ab, IgG    Islet cell antibodies (ICAs) are associated with type 1  diabetes (TID), an autoimmune endocrine disorder. These  antibodies may be present in individuals years before the  onset of clinical symptoms. To calculate  Juvenile Diabetes Foundation (JDF) units: multiply the  titer x 5 (1:8  8 x 5 = 40 JDF Units).  Performed by [facility],  [address] [facility] [place] [address] [phone]  [email or web address], Chantel Connors MD, Lab. Director       Lab Results   Component Value Date    CHOL 127 01/11/2019    CHOL 118 01/26/2018    TRIG 98 01/11/2019    TRIG 41 01/26/2018    HDL 52 01/11/2019    HDL 58 01/26/2018    LDL 56 01/11/2019    LDL 52 01/26/2018    NHDL 75 01/11/2019    NHDL 60 01/26/2018     Assessment and Plan:  #1 Controlled diabetes mellitus type 1 without complications (H)  May 2019 is Hemoglobin A1c 6.4%.  He has not been using the closed loop feature due to previous frustration with the sensor.  He has ordered the newer sensor and anticipates starting this when is arrives.  Patient is aware he needs to decrease his active insulin time from 3.5 to 2.5 hours when in auto mode.  Also discussed need to reduce his basal rate by 50-70% when exercising.  Review of his blood sugars shows some mild postprandial  hyperglycemia therefore adjusted his daytime carb ratio slightly.  If he does have trouble with the sensor, he can consider the FreeStyle Troy or Dexcom G6 instead.  Paper prescription for FreeStyle provided. New prescriptions for back up Lantus and Glucagon provided.  Blood pressure well controlled on Lisinopril.    - Hemoglobin A1c POCT  - insulin aspart (NOVOLOG VIAL) 100 UNITS/ML vial  Dispense: 50 mL; Refill: 3  - glucagon (GLUCAGON EMERGENCY) 1 MG kit  Dispense: 1 mg; Refill: 0  - lisinopril (PRINIVIL/ZESTRIL) 5 MG tablet  Dispense: 270 tablet; Refill: 3  - insulin glargine (LANTUS SOLOSTAR PEN) 100 UNIT/ML pen  Dispense: 3 mL; Refill: 0  - 25 Hydroxyvitamin D2 and D3  - Basic metabolic panel  - Continuous Blood Gluc  (FREESTYLE TROY 14 DAY READER) SUZIE  Dispense: 1 Device; Refill: 1  - Continuous Blood Gluc Sensor (FREESTYLE TROY 14 DAY SENSOR) MISC  Dispense: 8 each; Refill: 3    Current Settings: New Settings (changes bolded):   BASAL RATES and times:  12   AM (midnight): 0.575 units/hour    3:30     AM: 0.65 units/hour   11   PM: 0.575 units/hour  BASAL RATES and times:  12   AM (midnight): 0.575 units/hour    3:30     AM: 0.65 units/hour   11   PM: 0.575 units/hour      Carb ratio:   CARB RATIO and times:  12   AM (midnight): 12 Carb ratio:   CARB RATIO and times:  12   AM (midnight): 12  6     AM: 11  9     PM 12   Correction Factor (Sensitivity) and times:  12   AM (midnight): 60 mg/dL  6     AM: 50 mg/dL  9:30    PM: 60 mg/dL   Correction Factor (Sensitivity) and times:  12   AM (midnight): 60 mg/dL  6     AM: 50 mg/dL  9:30    PM: 60 mg/dL   Target BG Ranges:   BLOOD GLUCOSE TARGET and times:  12   AM (midnight): 120 Target BG Ranges:   BLOOD GLUCOSE TARGET and times:  12   AM (midnight): 120      #2 Low vitamin D  Repeat Vitamin D level today.    Follow-up: Return in about 6 months (around 11/24/2019).     >50% of 30 minute visit spent in face to face counseling, education and coordination  of care related to options for better glycemic control as well as preventing, detecting, and treating hypoglycemia.         Scribe Disclosure:  I, Briseida Meme, am serving as a scribe to document services personally performed by Bushra Rosa MD at this visit, based upon the provider's statements to me. All documentation has been reviewed by the aforementioned provider prior to being entered into the official medical record.       Portions of this medical record were completed by a scribe. UPON MY REVIEW AND AUTHENTICATION BY ELECTRONIC SIGNATURE, this confirms (a) I performed the applicable clinical services, and (b) the record is accurate.

## 2019-05-24 NOTE — LETTER
5/24/2019       RE: Joseph O Thoen  [address] [facility] [place] [address]     Dear Colleague,    Thank you for referring your patient, Joseph O Thoen, to the [facility] ENDOCRINOLOGY at [facility]. Please see a copy of my visit note below.    [facility]  Endocrinology  Bushra Rosa MD  05/24/2019      Chief Complaint:   Diabetes    History of Present Illness:   Joseph O Thoen is a 39 year old male who presents for follow up of diabetes.    #1 Type 1 diabetes mellitus   The patient was initially seen in May 2011 at which time he had a 30 pound weight loss with polyuria, polydipsia, serum glucose of 466, and Hemoglobin A1c of 13.5%.  His C-peptide was 0.8 with a glucose of 318.  DEMARCO antibodies were negative.  He was initially started on Metformin however his blood sugars did not improve therefore he was switched to Lantus and Novolog.  Blood work in September 2011 was significant for negative anti-islet and anti-insulin antibodies.  Since starting insulin, his diabetes has been well controlled with Hemoglobin A1c values all <7%.  He started on a Medtronic insulin pump in July 2012 and switched to the closed-loop system in January 2018.  July 2018 Hemoglobin A1c was 5.9%.  January 2019 was 6.6% at which time he was having issues with automode, only using it about 14% of the time.    Interval history:  He saw Dionne Rendon to discuss his pump and sensor in January 2019.  He appreciated her insights however has since stopped using the sensor when he ran out of his CGM supplies. He has not restarted automode since he does not have his CGM. However, he is ok with not restarting CGM since the automode is bothersome for him with the alarms.  He did not get around to ordering new sensors until recently and is expecting these to be delivered sometime next week.  He has liked not having his pump alarm so much.  He is going to be doing [place] next month and figured he should have a  sensor for this.    May 2019 Hemoglobin A1c is 6.4%.  He does note hypoglycemia approximately twice per week.  Most significant episode was after he went on a longer bike ride and did not bring a snack with him.  He did decrease his basal rate (by less than 50%) while exercising but still went low.  Usually his blood sugars are at goal however occasionally he will wake up high for no apparent reason.  For exam he woke up at 300 this morning even after taking a correction for slightly high sugar before bed.      Blood Glucose Monitoring:  We reviewed glucometer data together, checks 4-6 times per day with higher blood sugars (in 200-300s) with meals.  Average blood sugar 181 with standard deviation of 63     Current Insulin Pump Settings:  Type of Pump: Medtronic Minimed: Model 670G    BASAL RATES and times:  12   AM (midnight): 0.575 units/hour    3:30     AM: 0.65 units/hour   11   PM: 0.575 units/hour     Carb ratio:   CARB RATIO and times:  12   AM (midnight): 12    Basal 38%  :  Bolus 62%    Correction Factor (Sensitivity) and times:  12   AM (midnight): 60 mg/dL  6     AM: 50 mg/dL  9:30    PM: 60 mg/dL    Target BG Ranges:   BLOOD GLUCOSE TARGET and times:  12   AM (midnight): 120    Amount of Time Insulin is Active:  3.5 hrs    Diabetes monitoring and complications:  CAD: No  Last eye exam results: 2/13/19, no diabetic retinopathy   Last dental exam: not discussed  Microalbuminuria: negative 1/11/2019  HTN: Yes: on Lisinopril   On Statin: No  On Aspirin: No    #2 Low vitamin D  His Vitamin D level was low in January (<20)..  He did take a supplement for a short time however has since stopped.    Review of Systems:   Pertinent items are noted in HPI.  All other systems are negative.    Active Medications:      glucagon (GLUCAGON EMERGENCY) 1 MG kit, Inject 1 mg into the muscle once for 1 dose, Disp: 1 mg, Rfl: 0     insulin aspart (NOVOLOG VIAL) 100 UNITS/ML injection, Pt uses about 50 units per day in the  "pump, Disp: 50 mL, Rfl: 3     insulin glargine (LANTUS SOLOSTAR) 100 UNIT/ML pen, Inject 16 Units Subcutaneous At Bedtime To provide basal insulin If pump fails, Disp: 3 mL, Rfl: 0     lisinopril (PRINIVIL/ZESTRIL) 5 MG tablet, Take 3 tablet (15 mg) daily, Disp: 270 tablet, Rfl: 3     Allergies:   Benadryl [altaryl]      Past Medical History:  Type 1 diabetes mellitus  Hypertension      Past Surgical History:  No previous surgeries    Family History:   Thyroid disease - mother  Hypertension - mother       Social History:   Presents to clinic alone.  Tobacco Use: No previous or current tobacco use.   Alcohol Use: 1 drink per week     Physical Exam:   /79   Pulse 71   Ht 1.778 m (5' 10\")   Wt 68 kg (150 lb)   BMI 21.52 kg/m        Wt Readings from Last 4 Encounters:   05/24/19 68 kg (150 lb)   03/10/19 63.5 kg (140 lb)   01/11/19 65.5 kg (144 lb 8 oz)   07/20/18 65.8 kg (145 lb)       GENERAL APPEARANCE: Alert and no distress  NECK: No lymphadenopathy appreciated  Eyes: No obvious retinopathy noted  Thyroid: No obvious nodules palpated   CV: RRR without M/R/G  Lungs: CTA bilaterally  Abdomen: Soft, Nontender, non distended, positive bowel sounds   Neuro: no focal deficits  Skin: No infection in feet  Mood: Normal   Lymph: neg in neck and supraclavicular area     Diabetic foot exam: normal DP and PT pulses, no trophic changes or ulcerative lesions and normal monofilament exam       Data:  Lab Results   Component Value Date     01/11/2019    POTASSIUM 3.9 01/11/2019    CHLORIDE 101 01/11/2019    CO2 30 01/11/2019    ANIONGAP 6 01/11/2019     (H) 01/11/2019    BUN 14 01/11/2019    CR 0.63 (L) 01/11/2019    LUH 8.9 01/11/2019     Lab Results   Component Value Date    GFRESTIMATED >90 01/11/2019    GFRESTIMATED >90 01/26/2018    GFRESTIMATED >90  Non  GFR Calc   01/13/2017    GFRESTBLACK >90 01/11/2019    GFRESTBLACK >90 01/26/2018    GFRESTBLACK >90   GFR Calc   " 01/13/2017      Lab Results   Component Value Date    MICROL <5 01/11/2019    UMALCR Unable to calculate due to low value 01/11/2019      Lab Results   Component Value Date    A1C 5.9 08/28/2013    A1C 6.1 (A) 01/18/2013    A1C 5.5 09/14/2012    A1C 4.9 03/02/2012    A1C 5.2 11/03/2011    HEMOGLOBINA1 6.4 (A) 05/24/2019    HEMOGLOBINA1 5.9 07/20/2018    HEMOGLOBINA1 5.7 01/19/2018    HEMOGLOBINA1 6.2 (A) 01/13/2017    HEMOGLOBINA1 5.7 08/08/2016     Lab Results   Component Value Date    CPEPT 0.2 (L) 08/28/2013    GADAB  08/28/2013     <5.0  Reference range: 0.0 to 5.0  Unit: IU/mL  (Note)  INTERPRETIVE INFORMATION:  Glutamic Acid Decarboxylase  Antibody    A value greater than 5.0 IU/mL is considered positive for  Glutamic Acid Decarboxylase Antibody.  Performed by [facility],  [address] [facility] [place] [address] [phone]  [email or web address], Dieter Garzon MD, Lab. Director    ISCAB  09/02/2011     <1:4  Reference range: <1:4  (Note)  TEST INFORMATION: Islet Cell Ab, IgG    Islet cell antibodies (ICAs) are associated with type 1  diabetes (TID), an autoimmune endocrine disorder. These  antibodies may be present in individuals years before the  onset of clinical symptoms. To calculate  Juvenile Diabetes Foundation (JDF) units: multiply the  titer x 5 (1:8  8 x 5 = 40 JDF Units).  Performed by [facility],  [address] [facility] [place] [address] [phone]  [email or web address], Chantel Connors MD, Lab. Director       Lab Results   Component Value Date    CHOL 127 01/11/2019    CHOL 118 01/26/2018    TRIG 98 01/11/2019    TRIG 41 01/26/2018    HDL 52 01/11/2019    HDL 58 01/26/2018    LDL 56 01/11/2019    LDL 52 01/26/2018    NHDL 75 01/11/2019    NHDL 60 01/26/2018     Assessment and Plan:  #1 Controlled diabetes mellitus type 1 without complications (H)  May 2019 is Hemoglobin A1c 6.4%.  He has not been using the closed loop feature due to previous frustration with the sensor.  He has ordered the newer sensor  and anticipates starting this when is arrives.  Patient is aware he needs to decrease his active insulin time from 3.5 to 2.5 hours when in auto mode.  Also discussed need to reduce his basal rate by 50-70% when exercising.  Review of his blood sugars shows some mild postprandial hyperglycemia therefore adjusted his daytime carb ratio slightly.  If he does have trouble with the sensor, he can consider the FreeStyle Troy or Dexcom G6 instead.  Paper prescription for FreeStyle provided. New prescriptions for back up Lantus and Glucagon provided.  Blood pressure well controlled on Lisinopril.    - Hemoglobin A1c POCT  - insulin aspart (NOVOLOG VIAL) 100 UNITS/ML vial  Dispense: 50 mL; Refill: 3  - glucagon (GLUCAGON EMERGENCY) 1 MG kit  Dispense: 1 mg; Refill: 0  - lisinopril (PRINIVIL/ZESTRIL) 5 MG tablet  Dispense: 270 tablet; Refill: 3  - insulin glargine (LANTUS SOLOSTAR PEN) 100 UNIT/ML pen  Dispense: 3 mL; Refill: 0  - 25 Hydroxyvitamin D2 and D3  - Basic metabolic panel  - Continuous Blood Gluc  (FREESTYLE TROY 14 DAY READER) SUZIE  Dispense: 1 Device; Refill: 1  - Continuous Blood Gluc Sensor (FREESTYLE TROY 14 DAY SENSOR) MISC  Dispense: 8 each; Refill: 3    Current Settings: New Settings (changes bolded):   BASAL RATES and times:  12   AM (midnight): 0.575 units/hour    3:30     AM: 0.65 units/hour   11   PM: 0.575 units/hour  BASAL RATES and times:  12   AM (midnight): 0.575 units/hour    3:30     AM: 0.65 units/hour   11   PM: 0.575 units/hour      Carb ratio:   CARB RATIO and times:  12   AM (midnight): 12 Carb ratio:   CARB RATIO and times:  12   AM (midnight): 12  6     AM: 11  9     PM 12   Correction Factor (Sensitivity) and times:  12   AM (midnight): 60 mg/dL  6     AM: 50 mg/dL  9:30    PM: 60 mg/dL   Correction Factor (Sensitivity) and times:  12   AM (midnight): 60 mg/dL  6     AM: 50 mg/dL  9:30    PM: 60 mg/dL   Target BG Ranges:   BLOOD GLUCOSE TARGET and times:  12   AM (midnight):  120 Target BG Ranges:   BLOOD GLUCOSE TARGET and times:  12   AM (midnight): 120      #2 Low vitamin D  Repeat Vitamin D level today.    Follow-up: Return in about 6 months (around 11/24/2019).     >50% of 30 minute visit spent in face to face counseling, education and coordination of care related to options for better glycemic control as well as preventing, detecting, and treating hypoglycemia.       Scribe Disclosure:  I, Briseida Valentine, am serving as a scribe to document services personally performed by Bushra Rosa MD at this visit, based upon the provider's statements to me. All documentation has been reviewed by the aforementioned provider prior to being entered into the official medical record.    Portions of this medical record were completed by a scribe. UPON MY REVIEW AND AUTHENTICATION BY ELECTRONIC SIGNATURE, this confirms (a) I performed the applicable clinical services, and (b) the record is accurate.       Again, thank you for allowing me to participate in the care of your patient.      Sincerely,    Bushra Rosa MD

## 2019-05-29 LAB
DEPRECATED CALCIDIOL+CALCIFEROL SERPL-MC: <32 UG/L (ref 20–75)
VITAMIN D2 SERPL-MCNC: <5 UG/L
VITAMIN D3 SERPL-MCNC: 27 UG/L

## 2019-08-02 ENCOUNTER — MYC REFILL (OUTPATIENT)
Dept: ENDOCRINOLOGY | Facility: CLINIC | Age: 39
End: 2019-08-02

## 2019-08-02 DIAGNOSIS — E10.9 CONTROLLED DIABETES MELLITUS TYPE 1 WITHOUT COMPLICATIONS (H): ICD-10-CM

## 2019-08-02 RX ORDER — LISINOPRIL 5 MG/1
TABLET ORAL
Qty: 270 TABLET | Refills: 3 | Status: CANCELLED | OUTPATIENT
Start: 2019-08-02

## 2019-08-14 ENCOUNTER — MYC REFILL (OUTPATIENT)
Dept: ENDOCRINOLOGY | Facility: CLINIC | Age: 39
End: 2019-08-14

## 2019-08-14 DIAGNOSIS — E10.9 CONTROLLED DIABETES MELLITUS TYPE 1 WITHOUT COMPLICATIONS (H): ICD-10-CM

## 2019-08-14 NOTE — TELEPHONE ENCOUNTER
insulin aspart (NOVOLOG VIAL) 100 UNITS/ML vial      Last Written Prescription Date:  5-24-19  Last Fill Quantity: 50,   # refills: 3  Last Office Visit : 5-24-19  Future Office visit:  11-1-19    Routing refill request to provider for review/approval because:  Insulin - refilled per clinic

## 2019-10-30 NOTE — PROGRESS NOTES
[facility]  Endocrinology  Bushra Rosa MD  11/01/2019      Chief Complaint:   Diabetes    History of Present Illness:   Joseph O Thoen is a 39 year old male with a history of type I diabetes mellitus who presents alone for follow-up of diabetes.    #1 Type I diabetes mellitus   The patient was initially seen in May 2011 at which time he had a 30 pound weight loss with polyuria, polydipsia, serum glucose of 466, and Hemoglobin A1c of 13.5%.  His C-peptide was 0.8 with a glucose of 318.  DEMARCO antibodies were negative.  He was initially started on Metformin however his blood sugars did not improve therefore he was switched to Lantus and Novolog.  Blood work in September 2011 was significant for negative anti-islet and anti-insulin antibodies.  Since starting insulin, his diabetes has been well controlled with Hemoglobin A1c values all <7%.  He started on a Medtronic insulin pump in July 2012 and switched to the closed-loop system in January 2018.  July 2018 Hemoglobin A1c was 5.9%.  January 2019 was 6.6% at which time he was having issues with automode, only using it about 14% of the time. May 2019 hemoglobin was 6.4% with hypoglycemic episodes twice weekly.    Interval history: November 2019 A1c is 6.2% today. He has not been using the closed-loop system for the past year due to frustration with the alarms. Overall he feels he is doing well. Of note, he is interested in the DEXCOM-T-slim option when it becomes available in the future.     Blood Glucose Monitoring:  We reviewed glucometer data together. He does not remember the last time he had a low blood sugar. He does not feel he experiences hypoglycemia very often.     Current Insulin Pump Settings:  Type of Pump: Medtronic Minimed: Model 70G  BASAL RATES and times:  12   AM (midnight): 0.575 units/hour    3:30     AM: 0.650 units/hour   10   AM: 0.650 units/hour   6    PM: 0.650 units/hour   11   PM: 0.575 units/hour     Carb ratio:   CARB RATIO and times:  12    "AM (midnight): 12  6     AM:  11  9    PM:  12    Corection Factor (Sensitivity) and times:  12   AM (midnight): 60 mg/dL  6     AM: 50 mg/dL  9:30    PM: 60 mg/dL    Target BG Ranges:   BLOOD GLUCOSE TARGET and times:  12   AM (midnight): 120  Amount of Time Insulin is Active:  3:30 hrs    Diabetes monitoring and complications:  CAD: No  Last eye exam results: 2/13/19, no diabetic retinopathy  Microalbuminuria: negative 1/11/19  Neuropathy: No  HTN: Yes, on Lisinopril   On Statin: No  On Aspirin: No  Depression: No  Erectile dysfunction: No     #2 Low vitamin D  He had a low vitamin D level in January 2019 (<20) for which he took a supplement for a short period of time.     Interval history: May 2019 vitamin D level was normal at 32.     Review of Systems:   Pertinent items are noted in HPI.  All other systems are negative.    Active Medications:      insulin aspart (NOVOLOG VIAL) 100 UNITS/ML vial, Pt uses about 50 units per day in the pump, Disp: 50 mL, Rfl: 3     insulin glargine (LANTUS SOLOSTAR PEN) 100 UNIT/ML pen, Pt to be on lantus 24 units daily if pump fails, Disp: 3 mL, Rfl: 0     lisinopril (PRINIVIL/ZESTRIL) 5 MG tablet, Take 3 tablet (15 mg) daily, Disp: 270 tablet, Rfl: 3     glucagon (GLUCAGON EMERGENCY) 1 MG kit, Inject 1 mg into the muscle once for 1 dose, Disp: 1 mg, Rfl: 0      Allergies:   Benadryl [altaryl]      Past Medical History:  Type I diabetes mellitus   Hypertension      Past Surgical History:  History reviewed. No pertinent past surgical history.      Family History:   Thyroid disease - mother  Hypertension - mother      Social History:   The patient is single, a nonsmoker, and does consume alcohol (1 drink per week).        Physical Exam:   /78   Pulse 70   Ht 1.778 m (5' 10\")   Wt 67.7 kg (149 lb 4.8 oz)   BMI 21.42 kg/m       Wt Readings from Last 10 Encounters:   11/01/19 67.7 kg (149 lb 4.8 oz)   05/24/19 68 kg (150 lb)   03/10/19 63.5 kg (140 lb)   01/11/19 65.5 kg " (144 lb 8 oz)   07/20/18 65.8 kg (145 lb)   01/19/18 67.6 kg (149 lb 1.6 oz)   07/14/17 66.4 kg (146 lb 4.8 oz)   01/31/17 65.2 kg (143 lb 11.2 oz)   01/13/17 65.8 kg (145 lb)   08/08/16 66.7 kg (147 lb)        GENERAL APPEARANCE: Alert and no distress  NECK: No lymphadenopathy appreciated  Thyroid: No obvious nodules palpated   CV: RRR without M/R/G  Lungs: CTA bilaterally  Abdomen: Soft, Nontender, non distended, positive bowel sounds   Neuro: no focal deficits  Skin: No infection in feet  Feet: Sensation in tact to monofilament exam  Mood: Normal   Lymph: neg in neck and supraclavicular area      Data:  Lab Results   Component Value Date     05/24/2019    POTASSIUM 4.4 05/24/2019    CHLORIDE 104 05/24/2019    CO2 31 05/24/2019    ANIONGAP 5 05/24/2019    GLC 65 (L) 05/24/2019    BUN 13 05/24/2019    CR 0.64 (L) 05/24/2019    LUH 9.7 05/24/2019     Lab Results   Component Value Date    GFRESTIMATED >90 05/24/2019    GFRESTIMATED >90 01/11/2019    GFRESTIMATED >90 01/26/2018    GFRESTBLACK >90 05/24/2019    GFRESTBLACK >90 01/11/2019    GFRESTBLACK >90 01/26/2018      Lab Results   Component Value Date    MICROL <5 01/11/2019    UMALCR Unable to calculate due to low value 01/11/2019        Lab Results   Component Value Date    A1C 5.9 08/28/2013    A1C 6.1 (A) 01/18/2013    A1C 5.5 09/14/2012    A1C 4.9 03/02/2012    A1C 5.2 11/03/2011    HEMOGLOBINA1 6.2 (A) 11/01/2019    HEMOGLOBINA1 6.4 (A) 05/24/2019    HEMOGLOBINA1 5.9 07/20/2018    HEMOGLOBINA1 5.7 01/19/2018    HEMOGLOBINA1 6.2 (A) 01/13/2017     Lab Results   Component Value Date    CPEPT 0.2 (L) 08/28/2013    GADAB  08/28/2013     <5.0  Reference range: 0.0 to 5.0  Unit: IU/mL  (Note)  INTERPRETIVE INFORMATION:  Glutamic Acid Decarboxylase  Antibody    A value greater than 5.0 IU/mL is considered positive for  Glutamic Acid Decarboxylase Antibody.  Performed by [facility],  [address] [place] [address],[place] [address] [phone]  [email or web address] Dieter MAN  MD Ryann, Lab. Director    ISCAB  09/02/2011     <1:4  Reference range: <1:4  (Note)  TEST INFORMATION: Islet Cell Ab, IgG    Islet cell antibodies (ICAs) are associated with type 1  diabetes (TID), an autoimmune endocrine disorder. These  antibodies may be present in individuals years before the  onset of clinical symptoms. To calculate  Juvenile Diabetes Foundation (JDF) units: multiply the  titer x 5 (1:8  8 x 5 = 40 JDF Units).  Performed by [facility],  [address] [place] [id number] [phone]  [email or web address], Chantel Connors MD, Lab. Director       Lab Results   Component Value Date    CHOL 127 01/11/2019    CHOL 118 01/26/2018    TRIG 98 01/11/2019    TRIG 41 01/26/2018    HDL 52 01/11/2019    HDL 58 01/26/2018    LDL 56 01/11/2019    LDL 52 01/26/2018    NHDL 75 01/11/2019    NHDL 60 01/26/2018       Assessment and Plan:  #1 Type I diabetes mellitus   November A1c is 6.2%. He has not been using a closed-loop system due to frustrations associated with it. Encouraged him to consider Dexcom and Troy sensors.  However he remains uninterested at this time.  Also discussed option of nasal glucagon. He will contact us if he is interested in sensors or the nasal glucagon.      Of note, the patient plans to go on a [place] trip in the near future.  Prescription given for insulin pens to help with travel.  He has Lantus in case of pump failure.  - Hemoglobin A1c POCT  - blood glucose (STAS CONTOUR) test strip  Dispense: 200 strip; Refill: prn  - insulin aspart (NOVOLOG VIAL) 100 UNITS/ML vial  Dispense: 50 mL; Refill: 3  - MICROLET LANCETS MISC  Dispense: 200 each; Refill: 11  - HUMALOG KWIKPEN 100 UNIT/ML soln  Dispense: 15 mL; Refill: 0    #2 Low vitamin D  Normal vitamin D level in May 2018 of 32.  Will observe for now.      Flu shot today.     Follow-up: Return in about 6 months (around 5/1/2020).     >50% of 30 minute visit spent in face to face counseling, education and coordination of care  related to options for better glycemic control as well as preventing, detecting, and treating hypoglycemia.         Scribe Disclosure:  I, Carine Yu, am serving as a scribe to document services personally performed by Bushra Rosa MD at this visit, based upon the provider's statements to me. All documentation has been reviewed by the aforementioned provider prior to being entered into the official medical record.     Portions of this medical record were completed by a scribe. UPON MY REVIEW AND AUTHENTICATION BY ELECTRONIC SIGNATURE, this confirms (a) I performed the applicable clinical services, and (b) the record is accurate.

## 2019-11-01 ENCOUNTER — OFFICE VISIT (OUTPATIENT)
Dept: ENDOCRINOLOGY | Facility: CLINIC | Age: 39
End: 2019-11-01
Payer: COMMERCIAL

## 2019-11-01 VITALS
SYSTOLIC BLOOD PRESSURE: 123 MMHG | BODY MASS INDEX: 21.37 KG/M2 | WEIGHT: 149.3 LBS | DIASTOLIC BLOOD PRESSURE: 78 MMHG | HEART RATE: 70 BPM | HEIGHT: 70 IN

## 2019-11-01 DIAGNOSIS — E10.9 CONTROLLED DIABETES MELLITUS TYPE 1 WITHOUT COMPLICATIONS (H): Primary | ICD-10-CM

## 2019-11-01 LAB — HBA1C MFR BLD: 6.2 % (ref 4.3–6)

## 2019-11-01 RX ORDER — LANCETS
1 EACH MISCELLANEOUS
Qty: 200 EACH | Refills: 11 | Status: SHIPPED | OUTPATIENT
Start: 2019-11-01

## 2019-11-01 RX ORDER — INSULIN LISPRO 100 [IU]/ML
INJECTION, SOLUTION INTRAVENOUS; SUBCUTANEOUS
Qty: 15 ML | Refills: 0 | Status: SHIPPED | OUTPATIENT
Start: 2019-11-01 | End: 2021-03-26

## 2019-11-01 ASSESSMENT — MIFFLIN-ST. JEOR: SCORE: 1593.47

## 2019-11-01 ASSESSMENT — PAIN SCALES - GENERAL: PAINLEVEL: NO PAIN (0)

## 2019-11-01 NOTE — LETTER
11/1/2019       RE: Joseph O Thoen  [address] [facility] [address]     Dear Colleague,    Thank you for referring your patient, Joseph O Thoen, to the [facility] ENDOCRINOLOGY at [facility]. Please see a copy of my visit note below.    [facility]  Endocrinology  Bushra Rosa MD  11/01/2019      Chief Complaint:   Diabetes    History of Present Illness:   Joseph O Thoen is a 39 year old male with a history of type I diabetes mellitus who presents alone for follow-up of diabetes.    #1 Type I diabetes mellitus   The patient was initially seen in May 2011 at which time he had a 30 pound weight loss with polyuria, polydipsia, serum glucose of 466, and Hemoglobin A1c of 13.5%.  His C-peptide was 0.8 with a glucose of 318.  DEMARCO antibodies were negative.  He was initially started on Metformin however his blood sugars did not improve therefore he was switched to Lantus and Novolog.  Blood work in September 2011 was significant for negative anti-islet and anti-insulin antibodies.  Since starting insulin, his diabetes has been well controlled with Hemoglobin A1c values all <7%.  He started on a Medtronic insulin pump in July 2012 and switched to the closed-loop system in January 2018.  July 2018 Hemoglobin A1c was 5.9%.  January 2019 was 6.6% at which time he was having issues with automode, only using it about 14% of the time. May 2019 hemoglobin was 6.4% with hypoglycemic episodes twice weekly.    Interval history: November 2019 A1c is 6.2% today. He has not been using the closed-loop system for the past year due to frustration with the alarms. Overall he feels he is doing well. Of note, he is interested in the DEXCOM-T-slim option when it becomes available in the future.     Blood Glucose Monitoring:  We reviewed glucometer data together. He does not remember the last time he had a low blood sugar. He does not feel he experiences hypoglycemia very often.     Current Insulin  Pump Settings:  Type of Pump: Medtronic Minimed: Model 70G  BASAL RATES and times:  12   AM (midnight): 0.575 units/hour    3:30     AM: 0.650 units/hour   10   AM: 0.650 units/hour   6    PM: 0.650 units/hour   11   PM: 0.575 units/hour     Carb ratio:   CARB RATIO and times:  12   AM (midnight): 12  6     AM:  11  9    PM:  12    Corection Factor (Sensitivity) and times:  12   AM (midnight): 60 mg/dL  6     AM: 50 mg/dL  9:30    PM: 60 mg/dL    Target BG Ranges:   BLOOD GLUCOSE TARGET and times:  12   AM (midnight): 120  Amount of Time Insulin is Active:  3:30 hrs    Diabetes monitoring and complications:  CAD: No  Last eye exam results: 2/13/19, no diabetic retinopathy  Microalbuminuria: negative 1/11/19  Neuropathy: No  HTN: Yes, on Lisinopril   On Statin: No  On Aspirin: No  Depression: No  Erectile dysfunction: No     #2 Low vitamin D  He had a low vitamin D level in January 2019 (<20) for which he took a supplement for a short period of time.     Interval history: May 2019 vitamin D level was normal at 32.     Review of Systems:   Pertinent items are noted in HPI.  All other systems are negative.    Active Medications:      insulin aspart (NOVOLOG VIAL) 100 UNITS/ML vial, Pt uses about 50 units per day in the pump, Disp: 50 mL, Rfl: 3     insulin glargine (LANTUS SOLOSTAR PEN) 100 UNIT/ML pen, Pt to be on lantus 24 units daily if pump fails, Disp: 3 mL, Rfl: 0     lisinopril (PRINIVIL/ZESTRIL) 5 MG tablet, Take 3 tablet (15 mg) daily, Disp: 270 tablet, Rfl: 3     glucagon (GLUCAGON EMERGENCY) 1 MG kit, Inject 1 mg into the muscle once for 1 dose, Disp: 1 mg, Rfl: 0      Allergies:   Benadryl [altaryl]      Past Medical History:  Type I diabetes mellitus   Hypertension      Past Surgical History:  History reviewed. No pertinent past surgical history.      Family History:   Thyroid disease - mother  Hypertension - mother      Social History:   The patient is single, a nonsmoker, and does consume alcohol (1  "drink per week).        Physical Exam:   /78   Pulse 70   Ht 1.778 m (5' 10\")   Wt 67.7 kg (149 lb 4.8 oz)   BMI 21.42 kg/m        Wt Readings from Last 10 Encounters:   11/01/19 67.7 kg (149 lb 4.8 oz)   05/24/19 68 kg (150 lb)   03/10/19 63.5 kg (140 lb)   01/11/19 65.5 kg (144 lb 8 oz)   07/20/18 65.8 kg (145 lb)   01/19/18 67.6 kg (149 lb 1.6 oz)   07/14/17 66.4 kg (146 lb 4.8 oz)   01/31/17 65.2 kg (143 lb 11.2 oz)   01/13/17 65.8 kg (145 lb)   08/08/16 66.7 kg (147 lb)        GENERAL APPEARANCE: Alert and no distress  NECK: No lymphadenopathy appreciated  Thyroid: No obvious nodules palpated   CV: RRR without M/R/G  Lungs: CTA bilaterally  Abdomen: Soft, Nontender, non distended, positive bowel sounds   Neuro: no focal deficits  Skin: No infection in feet  Feet: Sensation in tact to monofilament exam  Mood: Normal   Lymph: neg in neck and supraclavicular area      Data:  Lab Results   Component Value Date     05/24/2019    POTASSIUM 4.4 05/24/2019    CHLORIDE 104 05/24/2019    CO2 31 05/24/2019    ANIONGAP 5 05/24/2019    GLC 65 (L) 05/24/2019    BUN 13 05/24/2019    CR 0.64 (L) 05/24/2019    LUH 9.7 05/24/2019     Lab Results   Component Value Date    GFRESTIMATED >90 05/24/2019    GFRESTIMATED >90 01/11/2019    GFRESTIMATED >90 01/26/2018    GFRESTBLACK >90 05/24/2019    GFRESTBLACK >90 01/11/2019    GFRESTBLACK >90 01/26/2018      Lab Results   Component Value Date    MICROL <5 01/11/2019    UMALCR Unable to calculate due to low value 01/11/2019        Lab Results   Component Value Date    A1C 5.9 08/28/2013    A1C 6.1 (A) 01/18/2013    A1C 5.5 09/14/2012    A1C 4.9 03/02/2012    A1C 5.2 11/03/2011    HEMOGLOBINA1 6.2 (A) 11/01/2019    HEMOGLOBINA1 6.4 (A) 05/24/2019    HEMOGLOBINA1 5.9 07/20/2018    HEMOGLOBINA1 5.7 01/19/2018    HEMOGLOBINA1 6.2 (A) 01/13/2017     Lab Results   Component Value Date    CPEPT 0.2 (L) 08/28/2013    GADAB  08/28/2013     <5.0  Reference range: 0.0 to " 5.0  Unit: IU/mL  (Note)  INTERPRETIVE INFORMATION:  Glutamic Acid Decarboxylase  Antibody    A value greater than 5.0 IU/mL is considered positive for  Glutamic Acid Decarboxylase Antibody.  Performed by [facility],  [address] [place] [address] [phone]  [email or web address], Dieter Garzon MD, Lab. Director    ISCAB  09/02/2011     <1:4  Reference range: <1:4  (Note)  TEST INFORMATION: Islet Cell Ab, IgG    Islet cell antibodies (ICAs) are associated with type 1  diabetes (TID), an autoimmune endocrine disorder. These  antibodies may be present in individuals years before the  onset of clinical symptoms. To calculate  Juvenile Diabetes Foundation (JDF) units: multiply the  titer x 5 (1:8  8 x 5 = 40 JDF Units).  Performed by [facility],  [address] [facility] [place] [address] [phone]  [email or web address], Chantel Connors MD, Lab. Director       Lab Results   Component Value Date    CHOL 127 01/11/2019    CHOL 118 01/26/2018    TRIG 98 01/11/2019    TRIG 41 01/26/2018    HDL 52 01/11/2019    HDL 58 01/26/2018    LDL 56 01/11/2019    LDL 52 01/26/2018    NHDL 75 01/11/2019    NHDL 60 01/26/2018       Assessment and Plan:  #1 Type I diabetes mellitus   November A1c is 6.2%. He has not been using a closed-loop system due to frustrations associated with it. Encouraged him to consider Dexcom and Troy sensors.  However he remains uninterested at this time.  Also discussed option of nasal glucagon. He will contact us if he is interested in sensors or the nasal glucagon.      Of note, the patient plans to go on a [place] trip in the near future.  Prescription given for insulin pens to help with travel.  He has Lantus in case of pump failure.  - Hemoglobin A1c POCT  - blood glucose (STAS CONTOUR) test strip  Dispense: 200 strip; Refill: prn  - insulin aspart (NOVOLOG VIAL) 100 UNITS/ML vial  Dispense: 50 mL; Refill: 3  - MICROLET LANCETS MISC  Dispense: 200 each; Refill: 11  - HUMALOG KWIKPEN 100 UNIT/ML  soln  Dispense: 15 mL; Refill: 0    #2 Low vitamin D  Normal vitamin D level in May 2018 of 32.  Will observe for now.      Flu shot today.     Follow-up: Return in about 6 months (around 5/1/2020).     >50% of 30 minute visit spent in face to face counseling, education and coordination of care related to options for better glycemic control as well as preventing, detecting, and treating hypoglycemia.         Scribe Disclosure:  I, Carine Yu, am serving as a scribe to document services personally performed by Bushra Rosa MD at this visit, based upon the provider's statements to me. All documentation has been reviewed by the aforementioned provider prior to being entered into the official medical record.     Portions of this medical record were completed by a scribe. UPON MY REVIEW AND AUTHENTICATION BY ELECTRONIC SIGNATURE, this confirms (a) I performed the applicable clinical services, and (b) the record is accurate.        Again, thank you for allowing me to participate in the care of your patient.      Sincerely,    Bushra Rosa MD

## 2019-11-01 NOTE — NURSING NOTE
Chief Complaint   Patient presents with     Follow Up     diabetes follow up     Jacinta Eastman CMA

## 2019-11-01 NOTE — LETTER
Patient:  Joseph O Thoen  :   1980  MRN:     [id number]        Mr.Joseph O Thoen  [address] [place] [id number]        2019    Dear Yung    To Whom It May Concern    I am the endocrinologist caring for this patient.  This patient has diabetes and has to carry insulin (including pen needles, pump) which is medically indicated. Please call [phone] if you have any questions.     Regards,   Bushra Rosa MD

## 2019-11-25 ENCOUNTER — MYC REFILL (OUTPATIENT)
Dept: ENDOCRINOLOGY | Facility: CLINIC | Age: 39
End: 2019-11-25

## 2019-11-25 DIAGNOSIS — E10.9 CONTROLLED DIABETES MELLITUS TYPE 1 WITHOUT COMPLICATIONS (H): ICD-10-CM

## 2019-11-25 RX ORDER — LISINOPRIL 5 MG/1
TABLET ORAL
Qty: 270 TABLET | Refills: 3 | Status: CANCELLED | OUTPATIENT
Start: 2019-11-25

## 2020-03-01 ENCOUNTER — HEALTH MAINTENANCE LETTER (OUTPATIENT)
Age: 40
End: 2020-03-01

## 2020-04-03 ENCOUNTER — CARE COORDINATION (OUTPATIENT)
Dept: EDUCATION SERVICES | Facility: CLINIC | Age: 40
End: 2020-04-03

## 2020-04-03 DIAGNOSIS — E10.9 CONTROLLED DIABETES MELLITUS TYPE 1 WITHOUT COMPLICATIONS (H): ICD-10-CM

## 2020-04-03 NOTE — PROGRESS NOTES
Diabetes Education Note:    Jose sent a message asking for help getting his testing supplies ordered.  Apparently [facility] no longer has a contract with [employer], and cannot supply him with the Contour Next test strips he uses with this pump.  Called the Contour helpline who suggested sending the order to [facility] mail order pharmacy (works with [facility]) and they will be sending a PA form to complete, as it's likely these will require a PA.      Will continue to follow.

## 2020-04-06 ENCOUNTER — CARE COORDINATION (OUTPATIENT)
Dept: EDUCATION SERVICES | Facility: CLINIC | Age: 40
End: 2020-04-06

## 2020-04-06 NOTE — PROGRESS NOTES
Diabetes Education Note:    Yung usually gets his test strips from [facility], who recently informed him that they no longer have a contract with this insurance for these test strips.  Discussed with the Contour Next PA Authorization line, who suggested sending the script to Yung's mail order pharmacy and then filling out the PA form.  This form was sent to Dr. Rosa for signature and should be forwarded to the email:  [email or web address].  They can assist with prior authorization.      Time spent in this visit:  20 minutes.

## 2020-06-16 ENCOUNTER — MYC MEDICAL ADVICE (OUTPATIENT)
Dept: ENDOCRINOLOGY | Facility: CLINIC | Age: 40
End: 2020-06-16

## 2020-07-16 DIAGNOSIS — E10.9 CONTROLLED DIABETES MELLITUS TYPE 1 WITHOUT COMPLICATIONS (H): ICD-10-CM

## 2020-07-20 RX ORDER — LISINOPRIL 5 MG/1
15 TABLET ORAL DAILY
Qty: 270 TABLET | Refills: 4 | Status: SHIPPED | OUTPATIENT
Start: 2020-07-20 | End: 2020-08-14

## 2020-07-20 NOTE — TELEPHONE ENCOUNTER
lisinopril (PRINIVIL/ZESTRIL) 5 MG tablet    Take 3 tablet (15 mg) daily    Last Written Prescription Date:  5/24/19  Last Fill Quantity: 270,   # refills: 3  Last Office Visit : 11/1/19  Future Office visit:  11/14/20    90 day pended.     Routing refill request to provider for review/approval because:  Overdue labs - creatinine & K+     Recent Labs   Lab Test 05/24/19  0857   CR 0.64*     Lab Test 05/24/19  0857   POTASSIUM 4.4

## 2020-08-13 NOTE — PROGRESS NOTES
"Joseph O Thoen is a 40 year old male who is being evaluated via a billable telephone visit.      The patient has been notified of following:     \"This telephone visit will be conducted via a call between you and your physician/provider. We have found that certain health care needs can be provided without the need for a physical exam.  This service lets us provide the care you need with a short phone conversation.  If a prescription is necessary we can send it directly to your pharmacy.  If lab work is needed we can place an order for that and you can then stop by our lab to have the test done at a later time.    Telephone visits are billed at different rates depending on your insurance coverage. During this emergency period, for some insurers they may be billed the same as an in-person visit.  Please reach out to your insurance provider with any questions.    If during the course of the call the physician/provider feels a telephone visit is not appropriate, you will not be charged for this service.\"    Patient has given verbal consent for Telephone visit?  Yes    What phone number would you like to be contacted at? [phone]    How would you like to obtain your AVS? Lamine Harrison MA    Patients Glucose Data was Sent via Email     [facility]  Endocrinology  Bushra Rosa MD  8/14/2020     Chief Complaint:   Diabetes    History of Present Illness:   Joseph O Thoen is a 40 year old male with a history of type I diabetes mellitus who presents alone for follow-up of diabetes.    Due to the COVID 19 pandemic this visit was converted to a telephone visit in order to help prevent spread of infection in this high risk patient and the general population .      Start time 106, stop time 131, total time 25 minutes    #1 Type I diabetes mellitus   The patient was initially seen in May 2011 at which time he had a 30 pound weight loss with polyuria, polydipsia, serum glucose of 466, and Hemoglobin A1c of 13.5%.  " His C-peptide was 0.8 with a glucose of 318.  DEMARCO antibodies were negative.  He was initially started on Metformin however his blood sugars did not improve therefore he was switched to Lantus and Novolog.  Blood work in September 2011 was significant for negative anti-islet and anti-insulin antibodies.  Since starting insulin, his diabetes has been well controlled with Hemoglobin A1c values all <7%.  He started on a Medtronic insulin pump in July 2012 and switched to the closed-loop system in January 2018.  July 2018 Hemoglobin A1c was 5.9%.  January 2019 was 6.6% at which time he was having issues with automode, only using it about 14% of the time. May 2019 hemoglobin was 6.4% with hypoglycemic episodes twice weekly.    Interval history: November 2019 A1c is 6.2%. He has not been using the closed-loop system for the past year due to frustration with the alarms. Overall he feels he is doing well. Of note, he is interested in the DEXCOM-T-slim option when it becomes available in the future.     Blood Glucose Monitoring:  We reviewed glucometer data together. He does not remember the last time he had a low blood sugar. He does not feel he experiences hypoglycemia very often.  He intermittently has hypoglycemia in the morning post breakfast (around 10 AM) and has some hyperglycemia with his lunch and evening meal (around 180s).    Current Insulin Pump Settings:  Type of Pump: MedCore Competence Minimed: Model 70G  BASAL RATES and times:  12   AM (midnight): 0.575 units/hour    3:30     AM: 0.650 units/hour   10   AM: 0.650 units/hour   6    PM: 0.650 units/hour   11   PM: 0.575 units/hour     Carb ratio:   CARB RATIO and times:  12   AM (midnight): 12  6     AM:  11  9    PM:  12    Corection Factor (Sensitivity) and times:  12   AM (midnight): 60 mg/dL  6     AM: 50 mg/dL  9:30    PM: 60 mg/dL    Target BG Ranges:   BLOOD GLUCOSE TARGET and times:  12   AM (midnight): 120  Amount of Time Insulin is Active:  3:30  hrs    Diabetes monitoring and complications:  CAD: No  Last eye exam results: 2/13/19, no diabetic retinopathy  Microalbuminuria: negative 1/11/19  Neuropathy: No  HTN: Yes, on Lisinopril   On Statin: No  On Aspirin: No  Depression: No  Erectile dysfunction: No     #2 Low vitamin D  He had a low vitamin D level in January 2019 (<20) for which he took a supplement for a short period of time.     Interval history: May 2019 vitamin D level was normal at 32.     #3 skin rash  The patient reports multiple nodules on his anterior chest as well as his back.  There is no associated pruritus.    Review of Systems:   Pertinent items are noted in HPI.  All other systems are negative.    Active Medications:   Current Outpatient Medications   Medication Sig Dispense Refill     acetone, Urine, test STRP 1 each by In Vitro route as needed. 50 strip 3     blood glucose (STAS CONTOUR) test strip Needs these test strips because this is the only meter that works with his pump.  Will need to test 6 times daily 600 strip 3     Blood Pressure Monitor KIT Take bp 2 times a week or PRN problems.  Dx DM 1 kit 0     insulin aspart (NOVOLOG VIAL) 100 UNITS/ML vial Pt uses about 50 units per day in the pump 50 mL 3     insulin glargine (LANTUS SOLOSTAR PEN) 100 UNIT/ML pen Pt to be on lantus 24 units daily if pump fails 3 mL 0     lisinopril (ZESTRIL) 5 MG tablet Take 3 tablets (15 mg) by mouth daily Take 3 tablet (15 mg) daily 270 tablet 4     MICROLET LANCETS MISC 1 each 6 times daily 200 each 11     Continuous Blood Gluc  (FREESTYLE ALEXIA 14 DAY READER) SUZIE 1 applicator as needed (use as needed) (Patient not taking: Reported on 8/13/2020) 1 Device 1     Continuous Blood Gluc Sensor (FREESTYLE ALEXIA 14 DAY SENSOR) MISC 1 applicator as needed (prn) (Patient not taking: Reported on 8/13/2020) 8 each 3     glucagon (GLUCAGON EMERGENCY) 1 MG kit Inject 1 mg into the muscle once for 1 dose 1 mg 0     HUMALOG KWIKPEN 100 UNIT/ML soln  Use as needed if pump fails - uses about 20 units per day (Patient not taking: Reported on 8/13/2020) 15 mL 0     Allergies:   Benadryl [altaryl]      Past Medical History:  Type I diabetes mellitus   Hypertension      Past Surgical History:  History reviewed. No pertinent past surgical history.      Family History:   Thyroid disease - mother  Hypertension - mother      Social History:   The patient works at the [employer].     Physical Exam:   Vital signs and physical exam not available.  However the patient reports feeling well. Psych: Alert and oriented times 3; coherent speech, normal  rate and volume, able to articulate logical thoughts, able to abstract reason, no tangential thoughts, no hallucinations or delusions     Data:  No visits with results within 1 Month(s) from this visit.   Latest known visit with results is:   Office Visit on 11/01/2019   Component Date Value Ref Range Status     Hemoglobin A1C 11/01/2019 6.2* 4.3 - 6.0 % Final         Assessment and Plan:  #1 Type I diabetes mellitus   His last hemoglobin A1c was in November 2019 at 6.2.  That being said, The patient reports some hypoglycemia with breakfast and some hyperglycemia postprandially.  He is on lisinopril at 15 mg/day.  Therefore we will change his insulin pump program to the following:    Current Insulin Pump Settings:  Type of Pump: Medtronic Minimed: Model 70G  BASAL RATES and times:  12   AM (midnight): 0.575 units/hour    3:30     AM: 0.650 units/hour   10   AM: 0.650 units/hour   6    PM: 0.650 units/hour   11   PM: 0.575 units/hour     Carb ratio:   CARB RATIO and times:  12   AM (midnight): 12  10    AM:  10  9    PM:  12    Corection Factor (Sensitivity) and times:  12   AM (midnight): 60 mg/dL  6     AM: 50 mg/dL  9:30    PM: 60 mg/dL    Target BG Ranges:   BLOOD GLUCOSE TARGET and times:  12   AM (midnight): 120  Amount of Time Insulin is Active:  3:30 hrs    Addition, we will have the patient meet with our diabetes  educators for consideration of the Dexcom G6.  He is interested in the T slim-Dexcom G6 once his pump is out of warranty (anticipated in a year).  He is also interested in the possibility of the screening.  We will look into cost for the patient.  The patient is on lisinopril.  Will have patient check yearly diabetes labs as noted below:    Orders Placed This Encounter   Procedures     Hemoglobin A1c     Basic metabolic panel     TSH     T4 free     Tissue transglutaminase rylee IgA and IgG     Albumin Random Urine Quantitative with Creat Ratio     Lipid Profile     25 Hydroxyvitamin D2 and D3     DERMATOLOGY REFERRAL     AMBULATORY ADULT DIABETES EDUCATOR REFERRAL       #2 Low vitamin D  We will recheck vitamin D level.      #3 skin rash  This looks to be a seborrheic keratosis.  Will have patient be seen by Derm for follow-up.    Follow-up: 6 months      Due to the COVID 19 pandemic this visit was converted to a telephone visit in order to help prevent spread of infection in this high risk patient and the general population .      Start time 106, stop time 131, total time 25 minutes

## 2020-08-14 ENCOUNTER — VIRTUAL VISIT (OUTPATIENT)
Dept: ENDOCRINOLOGY | Facility: CLINIC | Age: 40
End: 2020-08-14
Payer: COMMERCIAL

## 2020-08-14 DIAGNOSIS — R21 RASH: Primary | ICD-10-CM

## 2020-08-14 DIAGNOSIS — E10.9 CONTROLLED DIABETES MELLITUS TYPE 1 WITHOUT COMPLICATIONS (H): ICD-10-CM

## 2020-08-14 RX ORDER — LISINOPRIL 5 MG/1
15 TABLET ORAL DAILY
Qty: 270 TABLET | Refills: 4 | Status: SHIPPED | OUTPATIENT
Start: 2020-08-14 | End: 2021-03-26

## 2020-08-14 RX ORDER — GLUCAGON 3 MG/1
1 POWDER NASAL PRN
Qty: 1 EACH | Refills: 1 | Status: SHIPPED | OUTPATIENT
Start: 2020-08-14 | End: 2021-03-26

## 2020-08-14 NOTE — LETTER
"8/14/2020       RE: Joseph O Thoen  [address] [facility] [address]     Dear Colleague,    Thank you for referring your patient, Joseph O Thoen, to the [facility] ENDOCRINOLOGY at [facility]. Please see a copy of my visit note below.    Joseph O Thoen is a 40 year old male who is being evaluated via a billable telephone visit.      The patient has been notified of following:     \"This telephone visit will be conducted via a call between you and your physician/provider. We have found that certain health care needs can be provided without the need for a physical exam.  This service lets us provide the care you need with a short phone conversation.  If a prescription is necessary we can send it directly to your pharmacy.  If lab work is needed we can place an order for that and you can then stop by our lab to have the test done at a later time.    Telephone visits are billed at different rates depending on your insurance coverage. During this emergency period, for some insurers they may be billed the same as an in-person visit.  Please reach out to your insurance provider with any questions.    If during the course of the call the physician/provider feels a telephone visit is not appropriate, you will not be charged for this service.\"    Patient has given verbal consent for Telephone visit?  Yes    What phone number would you like to be contacted at? [phone]    How would you like to obtain your AVS? Lamine Harrison MA    Patients Glucose Data was Sent via Email     [facility]  Endocrinology  Bushra Rosa MD  8/14/2020     Chief Complaint:   Diabetes    History of Present Illness:   Joseph O Thoen is a 40 year old male with a history of type I diabetes mellitus who presents alone for follow-up of diabetes.    Due to the COVID 19 pandemic this visit was converted to a telephone visit in order to help prevent spread of infection in this high risk patient and " the general population .      Start time 106, stop time 131, total time 25 minutes    #1 Type I diabetes mellitus   The patient was initially seen in May 2011 at which time he had a 30 pound weight loss with polyuria, polydipsia, serum glucose of 466, and Hemoglobin A1c of 13.5%.  His C-peptide was 0.8 with a glucose of 318.  DEMARCO antibodies were negative.  He was initially started on Metformin however his blood sugars did not improve therefore he was switched to Lantus and Novolog.  Blood work in September 2011 was significant for negative anti-islet and anti-insulin antibodies.  Since starting insulin, his diabetes has been well controlled with Hemoglobin A1c values all <7%.  He started on a Medtronic insulin pump in July 2012 and switched to the closed-loop system in January 2018.  July 2018 Hemoglobin A1c was 5.9%.  January 2019 was 6.6% at which time he was having issues with automode, only using it about 14% of the time. May 2019 hemoglobin was 6.4% with hypoglycemic episodes twice weekly.    Interval history: November 2019 A1c is 6.2%. He has not been using the closed-loop system for the past year due to frustration with the alarms. Overall he feels he is doing well. Of note, he is interested in the DEXCOM-T-slim option when it becomes available in the future.     Blood Glucose Monitoring:  We reviewed glucometer data together. He does not remember the last time he had a low blood sugar. He does not feel he experiences hypoglycemia very often.  He intermittently has hypoglycemia in the morning post breakfast (around 10 AM) and has some hyperglycemia with his lunch and evening meal (around 180s).    Current Insulin Pump Settings:  Type of Pump: MedCognio Minimed: Model 70G  BASAL RATES and times:  12   AM (midnight): 0.575 units/hour    3:30     AM: 0.650 units/hour   10   AM: 0.650 units/hour   6    PM: 0.650 units/hour   11   PM: 0.575 units/hour     Carb ratio:   CARB RATIO and times:  12   AM (midnight):  12  6     AM:  11  9    PM:  12    Corection Factor (Sensitivity) and times:  12   AM (midnight): 60 mg/dL  6     AM: 50 mg/dL  9:30    PM: 60 mg/dL    Target BG Ranges:   BLOOD GLUCOSE TARGET and times:  12   AM (midnight): 120  Amount of Time Insulin is Active:  3:30 hrs    Diabetes monitoring and complications:  CAD: No  Last eye exam results: 2/13/19, no diabetic retinopathy  Microalbuminuria: negative 1/11/19  Neuropathy: No  HTN: Yes, on Lisinopril   On Statin: No  On Aspirin: No  Depression: No  Erectile dysfunction: No     #2 Low vitamin D  He had a low vitamin D level in January 2019 (<20) for which he took a supplement for a short period of time.     Interval history: May 2019 vitamin D level was normal at 32.     #3 skin rash  The patient reports multiple nodules on his anterior chest as well as his back.  There is no associated pruritus.    Review of Systems:   Pertinent items are noted in HPI.  All other systems are negative.    Active Medications:   Current Outpatient Medications   Medication Sig Dispense Refill     acetone, Urine, test STRP 1 each by In Vitro route as needed. 50 strip 3     blood glucose (STAS CONTOUR) test strip Needs these test strips because this is the only meter that works with his pump.  Will need to test 6 times daily 600 strip 3     Blood Pressure Monitor KIT Take bp 2 times a week or PRN problems.  Dx DM 1 kit 0     insulin aspart (NOVOLOG VIAL) 100 UNITS/ML vial Pt uses about 50 units per day in the pump 50 mL 3     insulin glargine (LANTUS SOLOSTAR PEN) 100 UNIT/ML pen Pt to be on lantus 24 units daily if pump fails 3 mL 0     lisinopril (ZESTRIL) 5 MG tablet Take 3 tablets (15 mg) by mouth daily Take 3 tablet (15 mg) daily 270 tablet 4     MICROLET LANCETS MISC 1 each 6 times daily 200 each 11     Continuous Blood Gluc  (FREESTYLE ALEXIA 14 DAY READER) SUZIE 1 applicator as needed (use as needed) (Patient not taking: Reported on 8/13/2020) 1 Device 1     Continuous  Blood Gluc Sensor (FREESTYLE ALEXIA 14 DAY SENSOR) MISC 1 applicator as needed (prn) (Patient not taking: Reported on 8/13/2020) 8 each 3     glucagon (GLUCAGON EMERGENCY) 1 MG kit Inject 1 mg into the muscle once for 1 dose 1 mg 0     HUMALOG KWIKPEN 100 UNIT/ML soln Use as needed if pump fails - uses about 20 units per day (Patient not taking: Reported on 8/13/2020) 15 mL 0     Allergies:   Benadryl [altaryl]      Past Medical History:  Type I diabetes mellitus   Hypertension      Past Surgical History:  History reviewed. No pertinent past surgical history.      Family History:   Thyroid disease - mother  Hypertension - mother      Social History:   The patient works at the [employer].     Physical Exam:   Vital signs and physical exam not available.  However the patient reports feeling well. Psych: Alert and oriented times 3; coherent speech, normal  rate and volume, able to articulate logical thoughts, able to abstract reason, no tangential thoughts, no hallucinations or delusions     Data:  No visits with results within 1 Month(s) from this visit.   Latest known visit with results is:   Office Visit on 11/01/2019   Component Date Value Ref Range Status     Hemoglobin A1C 11/01/2019 6.2* 4.3 - 6.0 % Final         Assessment and Plan:  #1 Type I diabetes mellitus   His last hemoglobin A1c was in November 2019 at 6.2.  That being said, The patient reports some hypoglycemia with breakfast and some hyperglycemia postprandially.  He is on lisinopril at 15 mg/day.  Therefore we will change his insulin pump program to the following:    Current Insulin Pump Settings:  Type of Pump: Medtronic Minimed: Model 70G  BASAL RATES and times:  12   AM (midnight): 0.575 units/hour    3:30     AM: 0.650 units/hour   10   AM: 0.650 units/hour   6    PM: 0.650 units/hour   11   PM: 0.575 units/hour     Carb ratio:   CARB RATIO and times:  12   AM (midnight): 12  10    AM:  10  9    PM:  12    Corection Factor (Sensitivity)  and times:  12   AM (midnight): 60 mg/dL  6     AM: 50 mg/dL  9:30    PM: 60 mg/dL    Target BG Ranges:   BLOOD GLUCOSE TARGET and times:  12   AM (midnight): 120  Amount of Time Insulin is Active:  3:30 hrs    Addition, we will have the patient meet with our diabetes educators for consideration of the Dexcom G6.  He is interested in the T slim-Dexcom G6 once his pump is out of warranty (anticipated in a year).  He is also interested in the possibility of the screening.  We will look into cost for the patient.  The patient is on lisinopril.  Will have patient check yearly diabetes labs as noted below:    Orders Placed This Encounter   Procedures     Hemoglobin A1c     Basic metabolic panel     TSH     T4 free     Tissue transglutaminase rylee IgA and IgG     Albumin Random Urine Quantitative with Creat Ratio     Lipid Profile     25 Hydroxyvitamin D2 and D3     DERMATOLOGY REFERRAL     AMBULATORY ADULT DIABETES EDUCATOR REFERRAL       #2 Low vitamin D  We will recheck vitamin D level.      #3 skin rash  This looks to be a seborrheic keratosis.  Will have patient be seen by Derm for follow-up.    Follow-up: 6 months      Due to the COVID 19 pandemic this visit was converted to a telephone visit in order to help prevent spread of infection in this high risk patient and the general population .      Start time 106, stop time 131, total time 25 minutes    Again, thank you for allowing me to participate in the care of your patient.      Sincerely,    Bushra Rosa MD

## 2020-08-16 ENCOUNTER — VIRTUAL VISIT (OUTPATIENT)
Dept: FAMILY MEDICINE | Facility: OTHER | Age: 40
End: 2020-08-16
Payer: COMMERCIAL

## 2020-08-16 DIAGNOSIS — Z20.822 SUSPECTED COVID-19 VIRUS INFECTION: Primary | ICD-10-CM

## 2020-08-16 DIAGNOSIS — Z20.822 SUSPECTED COVID-19 VIRUS INFECTION: ICD-10-CM

## 2020-08-16 PROCEDURE — U0003 INFECTIOUS AGENT DETECTION BY NUCLEIC ACID (DNA OR RNA); SEVERE ACUTE RESPIRATORY SYNDROME CORONAVIRUS 2 (SARS-COV-2) (CORONAVIRUS DISEASE [COVID-19]), AMPLIFIED PROBE TECHNIQUE, MAKING USE OF HIGH THROUGHPUT TECHNOLOGIES AS DESCRIBED BY CMS-2020-01-R: HCPCS | Performed by: FAMILY MEDICINE

## 2020-08-16 PROCEDURE — 99421 OL DIG E/M SVC 5-10 MIN: CPT | Performed by: PHYSICIAN ASSISTANT

## 2020-08-16 NOTE — PROGRESS NOTES
"Date: 2020 11:41:47  Clinician: Denis Martines  Clinician NPI: [id number]  Patient: joseph thoen  Patient : 1980  Patient Address: [address] [place] [id number]  Patient Phone: [phone]  Visit Protocol: URI  Patient Summary:  milady is a 40 year old ( : 1980 ) male who initiated a Visit for COVID-19 (Coronavirus) evaluation and screening. When asked the question \"Please sign me up to receive news, health information and promotions from [employer].\", milady responded \"No\".    milady states his symptoms started gradually 5-6 days ago.   His symptoms consist of a cough, rhinitis, and malaise.   Symptom details     Nasal secretions: The color of his mucus is green and clear.    Cough: milady coughs a few times an hour and his cough is not more bothersome at night. Phlegm does not come into his throat when he coughs. He believes his cough is caused by post-nasal drip.      milady denies having ear pain, headache, chills, enlarged lymph nodes, nausea, sore throat, teeth pain, ageusia, diarrhea, nasal congestion, vomiting, myalgias, anosmia, facial pain or pressure, fever, and wheezing. He also denies taking antibiotic medication in the past month, double sickening (worsening symptoms after initial improvement), and having recent facial or sinus surgery in the past 60 days. He is not experiencing dyspnea.   Precipitating events  He has not recently been exposed to someone with influenza. milady has been in close contact with the following high risk individuals: children under the age of 5.   Pertinent COVID-19 (Coronavirus) information  In the past 14 days, milady has not worked in a congregate living setting.   He does not work or volunteer as healthcare worker or a  and does not work or volunteer in a healthcare facility.   milady also has not lived in a congregate living setting in the past 14 days. He does not live with a healthcare worker.   milady has not had a close " contact with a laboratory-confirmed COVID-19 patient within 14 days of symptom onset.   Since December 2019, milady and has not had upper respiratory infection or influenza-like illness. Has not been diagnosed with lab-confirmed COVID-19 test   Pertinent medical history  milady does not need a return to work/school note.   Weight: 148 lbs   milady does not smoke or use smokeless tobacco.   Weight: 148 lbs    MEDICATIONS: lisinopril oral, Novolog U-100 Insulin aspart subcutaneous, ALLERGIES: Benadryl  Clinician Response:  Dear milady,   Your symptoms show that you may have coronavirus (COVID-19). This illness can cause fever, cough and trouble breathing. Many people get a mild case and get better on their own. Some people can get very sick.  What should I do?  We would like to test you for this virus.   1. Please call [phone] to schedule your visit. Explain that you were referred by [facility] to have a COVID-19 test. Be ready to [facility].  The following will serve as your written order for this COVID Test, ordered by me, for the indication of suspected COVID [Z20.828]: The test will be ordered in [email or web address], our electronic health record, after you are scheduled. It will show as ordered and authorized by Dylan Herring MD.  Order: COVID-19 (Coronavirus) PCR for SYMPTOMATIC testing from [facility].      2. When it's time for your COVID test:  Stay at least 6 feet away from others. (If someone will drive you to your test, stay in the backseat, as far away from the  as you can.)   Cover your mouth and nose with a mask, tissue or washcloth.  Go straight to the testing site. Don't make any stops on the way there or back.      3.Starting now: Stay home and away from others (self-isolate) until:   You've had no fever---and no medicine that reduces fever---for one full day (24 hours). And...   Your other symptoms have gotten better. For example, your cough or breathing has improved. And...   At least 10  "days have passed since your symptoms started.       During this time, don't leave the house except for testing or medical care.   Stay in your own room, even for meals. Use your own bathroom if you can.   Stay away from others in your home. No hugging, kissing or shaking hands. No visitors.  Don't go to work, school or anywhere else.    Clean \"high touch\" surfaces often (doorknobs, counters, handles, etc.). Use a household cleaning spray or wipes. You'll find a full list of  on the EPA website: www.epa.gov/pesticide-registration/list-n-disinfectants-use-against-sars-cov-2.   Cover your mouth and nose with a mask, tissue or washcloth to avoid spreading germs.  Wash your hands and face often. Use soap and water.  Caregivers in these groups are at risk for severe illness due to COVID-19:  o People 65 years and older  o People who live in a nursing home or long-term care facility  o People with chronic disease (lung, heart, cancer, diabetes, kidney, liver, immunologic)  o People who have a weakened immune system, including those who:   Are in cancer treatment  Take medicine that weakens the immune system, such as corticosteroids  Had a bone marrow or organ transplant  Have an immune deficiency  Have poorly controlled HIV or AIDS  Are obese (body mass index of 40 or higher)  Smoke regularly   o Caregivers should wear gloves while washing dishes, handling laundry and cleaning bedrooms and bathrooms.  o Use caution when washing and drying laundry: Don't shake dirty laundry, and use the warmest water setting that you can.  o For more tips, go to www.cdc.gov/coronavirus/2019-ncov/downloads/10Things.pdf.    4.Sign up for [email or web address]. We know it's scary to hear that you might have COVID-19. We want to track your symptoms to make sure you're okay over the next 2 weeks. Please look for an email from Jacque Preston---this is a free, online program that we'll use to keep in touch. To sign up, follow the link in the email. " Learn more at [email or web address]  How can I take care of myself?   Get lots of rest. Drink extra fluids (unless a doctor has told you not to).   Take Tylenol (acetaminophen) for fever or pain. If you have liver or kidney problems, ask your family doctor if it's okay to take Tylenol.   Adults can take either:    650 mg (two 325 mg pills) every 4 to 6 hours, or...   1,000 mg (two 500 mg pills) every 8 hours as needed.    Note: Don't take more than 3,000 mg in one day. Acetaminophen is found in many medicines (both prescribed and over-the-counter medicines). Read all labels to be sure you don't take too much.   For children, check the Tylenol bottle for the right dose. The dose is based on the child's age or weight.    If you have other health problems (like cancer, heart failure, an organ transplant or severe kidney disease): Call your specialty clinic if you don't feel better in the next 2 days.       Know when to call 911. Emergency warning signs include:    Trouble breathing or shortness of breath Pain or pressure in the chest that doesn't go away Feeling confused like you haven't felt before, or not being able to wake up Bluish-colored lips or face.  Where can I get more information?   [facility] -- About COVID-19: [email or web address]   CDC -- What to Do If You're Sick: www.cdc.gov/coronavirus/2019-ncov/about/steps-when-sick.html   CDC -- Ending Home Isolation: www.cdc.gov/coronavirus/2019-ncov/hcp/disposition-in-home-patients.html   CDC -- Caring for Someone: www.cdc.gov/coronavirus/2019-ncov/if-you-are-sick/care-for-someone.html   [facility] -- Interim Guidance for Hospital Discharge to Home: www.health.[facility].mn.us/diseases/coronavirus/hcp/hospdischarge.pdf   [facility] clinical trials (COVID-19 research studies): clinicalaffairs.[facility]-clinical-trials    Below are the COVID-19 hotlines at the [place] Department of Health ([facility]). Interpreters are available.    For  health questions: Call 673-874-8045 or 1-818.151.2574 (7 a.m. to 7 p.m.) For questions about schools and childcare: Call 134-309-0937 or 1-449.646.6680 (7 a.m. to 7 p.m.)    Diagnosis: Other malaise  Diagnosis ICD: R53.81

## 2020-08-17 LAB
SARS-COV-2 RNA SPEC QL NAA+PROBE: NOT DETECTED
SPECIMEN SOURCE: NORMAL

## 2020-08-20 ENCOUNTER — TELEPHONE (OUTPATIENT)
Dept: ENDOCRINOLOGY | Facility: CLINIC | Age: 40
End: 2020-08-20

## 2020-08-20 NOTE — TELEPHONE ENCOUNTER
[employer] message sent with baqsimi coupon enrollment link attached.   Christiane Collins RN on 8/20/2020 at 11:18 AM

## 2020-08-20 NOTE — TELEPHONE ENCOUNTER
----- Message from Hyacinth Aguila RN sent at 8/20/2020 10:33 AM CDT -----  Regarding: FW: COupons copay cared with [employer]  Does he go to a  certain  site to enroll because I can't find anything on it . Please my chart him what to do.   ----- Message -----  From: Christiane Collins RN  Sent: 8/19/2020   3:30 PM CDT  To: Hyacinth Aguila RN  Subject: RE: COupons copay cared with [employer]              Pts have to enroll - provide contact information than the coupon is sent to them via email or snail mail   ----- Message -----  From: Hyacinth Aguila RN  Sent: 8/19/2020   9:23 AM CDT  To: Christiane Collins RN  Subject: COupons copay cared with [employer]                  I looked for basqimi on line but wasn't sure how to get to a coupon . DO you know?  ----- Message -----  From: Hina Montoya MA  Sent: 8/17/2020  12:41 PM CDT  To: Bushra Rosa MD, #    Patient needs to try alternatives first or pay out of pocket 364.18 Hyacinth or Mariah do  you ladies know of any coupons?    Hina  ----- Message -----  From: Jacinta Harrison MA  Sent: 8/14/2020   1:31 PM CDT  To: Hina Montoya MA      ----- Message -----  From: Bushra Rosa MD  Sent: 8/14/2020   1:25 PM CDT  To: [facility]    What is cost of basqimi for pt?  Can we use a coupon to make it cheaper?

## 2020-10-02 ENCOUNTER — TELEPHONE (OUTPATIENT)
Dept: ENDOCRINOLOGY | Facility: CLINIC | Age: 40
End: 2020-10-02

## 2020-10-02 NOTE — TELEPHONE ENCOUNTER
Patient was instructed by mayra, which he read on 9/2, that Dr Rosa would like him to see Diabetes ed for CGM and to see Dermatology, as well as schedule Feb follow up with Dr Rosa.    M for pt to c/b to schedule.

## 2020-10-02 NOTE — TELEPHONE ENCOUNTER
----- Message from Bushra Rosa MD sent at 9/30/2020  2:16 PM CDT -----  Regarding: did these visits get ordered per my request?  Just checking - did the pt get scheduled with dermatology or diabetes ed?  ----- Message -----  From: Bushra Rosa MD  Sent: 9/21/2020  To: Bushra Rosa MD    Patient get set up with dermatology, diabetes Ed, and CGM?

## 2020-10-07 ENCOUNTER — TELEPHONE (OUTPATIENT)
Dept: ENDOCRINOLOGY | Facility: CLINIC | Age: 40
End: 2020-10-07

## 2020-10-07 NOTE — TELEPHONE ENCOUNTER
----- Message from Winnie REN Link sent at 10/2/2020 12:34 PM CDT -----  Regarding: RE: did these visits get ordered per my request?  Patient was sent mychart message which he read on 9/2 and has not called to schedule. I tried calling again but no answer. I left him a message.  ----- Message -----  From: Bushra Rosa MD  Sent: 9/30/2020   2:16 PM CDT  To: [facility]  Subject: did these visits get ordered per my request?     Just checking - did the pt get scheduled with dermatology or diabetes ed?  ----- Message -----  From: Bushra Rosa MD  Sent: 9/21/2020  To: Bushra Rosa MD    Patient get set up with dermatology, diabetes Ed, and CGM?

## 2020-12-07 ENCOUNTER — TELEPHONE (OUTPATIENT)
Dept: ENDOCRINOLOGY | Facility: CLINIC | Age: 40
End: 2020-12-07

## 2020-12-07 NOTE — LETTER
Patient:  Joseph O Thoen  :   1980  MRN:     [id number]        Mr.Joseph O Thoen  [address] [facility] [address]        2020    Dear Yung    I see that you do not have a follow-up appointment in endocrinology. I would recommend that you be evaluated by an endocrinologist to minimize complications. If you like to be seen at the [facility], please call [phone] select option #1 for an appointment.    Regards    Bushra Rosa MD

## 2020-12-14 ENCOUNTER — HEALTH MAINTENANCE LETTER (OUTPATIENT)
Age: 40
End: 2020-12-14

## 2020-12-28 ENCOUNTER — CARE COORDINATION (OUTPATIENT)
Dept: EDUCATION SERVICES | Facility: CLINIC | Age: 40
End: 2020-12-28

## 2020-12-28 DIAGNOSIS — E10.9 DIABETES MELLITUS TYPE 1, CONTROLLED (H): Primary | ICD-10-CM

## 2020-12-28 RX ORDER — PROCHLORPERAZINE 25 MG/1
SUPPOSITORY RECTAL
Qty: 9 EACH | Refills: 3 | Status: SHIPPED | OUTPATIENT
Start: 2020-12-28 | End: 2022-01-17

## 2020-12-28 RX ORDER — PROCHLORPERAZINE 25 MG/1
SUPPOSITORY RECTAL
Qty: 1 EACH | Refills: 3 | Status: SHIPPED | OUTPATIENT
Start: 2020-12-28 | End: 2022-03-04

## 2020-12-28 NOTE — PROGRESS NOTES
Diabetes Education Note:      Yung has not been wearing his Medtronic sensor and thus has not been using Auto Mode in his pump for several months, he states.  He is interested in using a Dexcom G6 sensor, which Dr. Rosa has suggested to him.     Joseph O Thoen meets the following criteria for a continuous glucose monitoring (CGM) system.      Has a diagnosis of diabetes,: This patient has a diagnosis of Type 1 diabetes  Uses a personal blood glucose meter and checks glucose four or more times daily and has for the past 90 days as documented in glucose reports I have personally reviewed   Treated with insulin with multiple daily injections (MDI)  or a constant subcutaneous infusion (CSI) pump:  This patient is using a subcutaneous insulin pump.     Has additional conditions that require closer monitoring than can be obtained by blood glucose testing.  This patient  requires frequent adjustments of the insulin treatment regimen, based on therapeutic CGM test results and has frequent episodes of hypoglycemia  Has completed comprehensive diabetes education and training specific to the use of a CGM device.    Order for Dexcom G6 sensors and transmitters sent to the [facility].  This will most likely require a prior authorization.      Cindy Rendon, ABBYN, RN, [facility]  Certified Diabetes Care and   [facility] Endocrinology and Diabetes  [facility]  Clinic [phone]  Phone [phone]

## 2021-01-11 ENCOUNTER — NURSE TRIAGE (OUTPATIENT)
Dept: NURSING | Facility: CLINIC | Age: 41
End: 2021-01-11

## 2021-01-11 NOTE — TELEPHONE ENCOUNTER
Milady calls to follow up on refill of his Dexcom sensor and transmitter.    [facility] advised that both were filled on 12/28/20. He verbalized understanding.    [facility] phoned pharmacy [facility] [place], [place] - [address] [place] [address] [phone]  At [phone]. Pharmacy staff advised that patient call this number [phone] to discuss his order.    [facility] attempted to call milady back at 3:15 PM, left him a detailed message.    Mayra Montoya RN/[place] Nurse Advisor    Additional Information    Caller has medication question only, adult not sick, and triager answers question    Protocols used: MEDICATION QUESTION CALL-A-OH

## 2021-03-16 ENCOUNTER — OFFICE VISIT (OUTPATIENT)
Dept: URGENT CARE | Facility: URGENT CARE | Age: 41
End: 2021-03-16
Payer: COMMERCIAL

## 2021-03-16 VITALS
BODY MASS INDEX: 20.81 KG/M2 | OXYGEN SATURATION: 96 % | WEIGHT: 145 LBS | HEART RATE: 63 BPM | SYSTOLIC BLOOD PRESSURE: 110 MMHG | DIASTOLIC BLOOD PRESSURE: 60 MMHG | TEMPERATURE: 98.1 F

## 2021-03-16 DIAGNOSIS — M54.50 ACUTE RIGHT-SIDED LOW BACK PAIN WITHOUT SCIATICA: ICD-10-CM

## 2021-03-16 DIAGNOSIS — E10.9 CONTROLLED DIABETES MELLITUS TYPE 1 WITHOUT COMPLICATIONS (H): ICD-10-CM

## 2021-03-16 DIAGNOSIS — R11.2 NON-INTRACTABLE VOMITING WITH NAUSEA, UNSPECIFIED VOMITING TYPE: Primary | ICD-10-CM

## 2021-03-16 LAB
ALBUMIN SERPL-MCNC: 4.6 G/DL (ref 3.4–5)
ALBUMIN UR-MCNC: NEGATIVE MG/DL
ALP SERPL-CCNC: 63 U/L (ref 40–150)
ALT SERPL W P-5'-P-CCNC: 32 U/L (ref 0–70)
ANION GAP SERPL CALCULATED.3IONS-SCNC: 1 MMOL/L (ref 3–14)
APPEARANCE UR: CLEAR
AST SERPL W P-5'-P-CCNC: 19 U/L (ref 0–45)
BASOPHILS NFR BLD AUTO: 0.3 %
BILIRUB SERPL-MCNC: 1.2 MG/DL (ref 0.2–1.3)
BILIRUB UR QL STRIP: NEGATIVE
BUN SERPL-MCNC: 10 MG/DL (ref 7–30)
CALCIUM SERPL-MCNC: 9.4 MG/DL (ref 8.5–10.1)
CHLORIDE SERPL-SCNC: 104 MMOL/L (ref 94–109)
CO2 SERPL-SCNC: 32 MMOL/L (ref 20–32)
COLOR UR AUTO: YELLOW
CREAT SERPL-MCNC: 0.63 MG/DL (ref 0.66–1.25)
DIFFERENTIAL METHOD BLD: NORMAL
EOSINOPHIL NFR BLD AUTO: 0.3 %
ERYTHROCYTE [DISTWIDTH] IN BLOOD BY AUTOMATED COUNT: 12.1 % (ref 10–15)
GFR SERPL CREATININE-BSD FRML MDRD: >90 ML/MIN/{1.73_M2}
GLUCOSE SERPL-MCNC: 130 MG/DL (ref 70–99)
GLUCOSE UR STRIP-MCNC: NEGATIVE MG/DL
HCT VFR BLD AUTO: 45.7 % (ref 40–53)
HGB BLD-MCNC: 15.8 G/DL (ref 13.3–17.7)
HGB UR QL STRIP: NEGATIVE
KETONES UR STRIP-MCNC: NEGATIVE MG/DL
LEUKOCYTE ESTERASE UR QL STRIP: NEGATIVE
LYMPHOCYTES NFR BLD AUTO: 22.8 %
MCH RBC QN AUTO: 30.6 PG (ref 26.5–33)
MCHC RBC AUTO-ENTMCNC: 34.6 G/DL (ref 31.5–36.5)
MCV RBC AUTO: 89 FL (ref 78–100)
MONOCYTES NFR BLD AUTO: 7.1 %
NEUTROPHILS NFR BLD AUTO: 69.5 %
NITRATE UR QL: NEGATIVE
PH UR STRIP: 7.5 PH (ref 5–7)
PLATELET # BLD AUTO: 329 10E9/L (ref 150–450)
POTASSIUM SERPL-SCNC: 4.1 MMOL/L (ref 3.4–5.3)
PROT SERPL-MCNC: 8.2 G/DL (ref 6.8–8.8)
RBC # BLD AUTO: 5.16 10E12/L (ref 4.4–5.9)
RBC #/AREA URNS AUTO: ABNORMAL /HPF
SODIUM SERPL-SCNC: 137 MMOL/L (ref 133–144)
SOURCE: ABNORMAL
SP GR UR STRIP: 1.01 (ref 1–1.03)
UROBILINOGEN UR STRIP-ACNC: 0.2 EU/DL (ref 0.2–1)
WBC # BLD AUTO: 8.6 10E9/L (ref 4–11)
WBC #/AREA URNS AUTO: ABNORMAL /HPF

## 2021-03-16 PROCEDURE — U0005 INFEC AGEN DETEC AMPLI PROBE: HCPCS | Performed by: PHYSICIAN ASSISTANT

## 2021-03-16 PROCEDURE — 81001 URINALYSIS AUTO W/SCOPE: CPT | Performed by: PHYSICIAN ASSISTANT

## 2021-03-16 PROCEDURE — 85025 COMPLETE CBC W/AUTO DIFF WBC: CPT | Performed by: PHYSICIAN ASSISTANT

## 2021-03-16 PROCEDURE — 99214 OFFICE O/P EST MOD 30 MIN: CPT | Performed by: PHYSICIAN ASSISTANT

## 2021-03-16 PROCEDURE — U0003 INFECTIOUS AGENT DETECTION BY NUCLEIC ACID (DNA OR RNA); SEVERE ACUTE RESPIRATORY SYNDROME CORONAVIRUS 2 (SARS-COV-2) (CORONAVIRUS DISEASE [COVID-19]), AMPLIFIED PROBE TECHNIQUE, MAKING USE OF HIGH THROUGHPUT TECHNOLOGIES AS DESCRIBED BY CMS-2020-01-R: HCPCS | Performed by: PHYSICIAN ASSISTANT

## 2021-03-16 PROCEDURE — 80053 COMPREHEN METABOLIC PANEL: CPT | Performed by: PHYSICIAN ASSISTANT

## 2021-03-16 PROCEDURE — 36415 COLL VENOUS BLD VENIPUNCTURE: CPT | Performed by: PHYSICIAN ASSISTANT

## 2021-03-16 NOTE — PROGRESS NOTES
Chief Complaint   Patient presents with     Urgent Care     Back Pain     c/o sore on back for 1 day       ASSESSMENT/PLAN:  Jose was seen today for urgent care and back pain.    Diagnoses and all orders for this visit:    Non-intractable vomiting with nausea, unspecified vomiting type  -     Symptomatic COVID-19 Virus (Coronavirus) by PCR    Acute right-sided low back pain without sciatica  -     UA with Microscopic reflex to Culture  -     CBC with platelets and differential  -     Comprehensive metabolic panel (BMP + Alb, Alk Phos, ALT, AST, Total. Bili, TP)    Controlled diabetes mellitus type 1 without complications (H)    Differential diagnosis includes but not limited to COVID-19, muscle strain, muscle spasm, DKA, gastroenteritis, nephrolithiasis, gallbladder etiology, appendicitis, diverticulitis among others    Overall patient appears well at the visit today and his vitals are reassuring.  Exam is within normal limits and there are no signs.  Patient be tested for COVID-19.  We will do some basic blood work given his blood sugars being elevated last night.  He has improved since his episode of vomiting and his blood sugars are closer to a normal level.  Nothing is necessary to do CT scan or look for a kidney stone based off of his appearance and relatively normal symptoms.  There was no blood on the UA.  At this time not concerned for appendicitis given the lack of tenderness.  Will update patient as labs return if there is any abnormality and need to change treatment plan.  Otherwise patient will treat symptomatically with ibuprofen and Tylenol for pain and follow-up with primary care provider the next 2 to 3 days if not improving.  Given strict precautions to go to the ER or return to clinic if any new or worsening symptoms develop.    - N95, surgical mask, gown, gloves and face shield were worn.    35 minutes spent on the date of the encounter doing chart review, history and exam, documentation and  further activities as noted above    The plan of care was discussed with the patient. They understand and agree with the course of treatment prescribed. A printed summary was given including instructions and medications.    SUBJECTIVE:  Jose is a 41 year old male with a history of type 1 diabetes and hypertension who presents to urgent care with 1 day of right lower back and flank pain.  He woke up with it yesterday and persisted throughout the day.  Last night his symptoms worsened and experienced nausea some chills and vomited one time.  After vomiting he did feel better.  During that time his blood sugars were 200-250 over what they were normally and he could not bring these down.  No changes in medications.  Denies any fever, headache, sore throat, other URI symptoms, cough, chest pain, shortness of breath, diarrhea, myalgias, rash, urinary symptoms    ROS: Pertinent ROS neg other than the symptoms noted above in the HPI.     OBJECTIVE:  /60   Pulse 63   Temp 98.1  F (36.7  C) (Oral)   Wt 65.8 kg (145 lb)   SpO2 96%   BMI 20.81 kg/m     GENERAL: healthy, alert and no distress  EYES: Eyes grossly normal to inspection, PERRL and conjunctivae and sclerae normal  RESP: lungs clear to auscultation - no rales, rhonchi or wheezes  CV: regular rate and rhythm, normal S1 S2, no S3 or S4, no murmur, click or rub, no peripheral edema and peripheral pulses strong  ABDOMEN: soft, nontender, no hepatosplenomegaly, no masses and bowel sounds normal  MS: no gross musculoskeletal defects noted, no edema.  No midline tenderness, no flank tenderness, no CVA tenderness.  No significant pain with range of motion of the trunk.  SKIN: no suspicious lesions or rashes  NEURO: Normal strength and tone, mentation intact and speech normal    DIAGNOSTICS    Results for orders placed or performed in visit on 03/16/21   UA with Microscopic reflex to Culture     Status: Abnormal    Specimen: Midstream Urine   Result Value Ref  Range    Color Urine Yellow     Appearance Urine Clear     Glucose Urine Negative NEG^Negative mg/dL    Bilirubin Urine Negative NEG^Negative    Ketones Urine Negative NEG^Negative mg/dL    Specific Gravity Urine 1.015 1.003 - 1.035    pH Urine 7.5 (H) 5.0 - 7.0 pH    Protein Albumin Urine Negative NEG^Negative mg/dL    Urobilinogen Urine 0.2 0.2 - 1.0 EU/dL    Nitrite Urine Negative NEG^Negative    Blood Urine Negative NEG^Negative    Leukocyte Esterase Urine Negative NEG^Negative    Source Midstream Urine     WBC Urine 0 - 5 OTO5^0 - 5 /HPF    RBC Urine O - 2 OTO2^O - 2 /HPF        Current Outpatient Medications   Medication     acetone, Urine, test STRP     blood glucose (STAS CONTOUR) test strip     Blood Pressure Monitor KIT     Continuous Blood Gluc Sensor (DEXCOM G6 SENSOR) MISC     Continuous Blood Gluc Transmit (DEXCOM G6 TRANSMITTER) MISC     Glucagon (BAQSIMI TWO PACK) 3 MG/DOSE POWD     insulin aspart (NOVOLOG VIAL) 100 UNITS/ML vial     insulin glargine (LANTUS SOLOSTAR PEN) 100 UNIT/ML pen     lisinopril (ZESTRIL) 5 MG tablet     MICROLET LANCETS MISC     Continuous Blood Gluc  (FREESTYLE ALEXIA 14 DAY READER) SUZIE     Continuous Blood Gluc Sensor (FREESTYLE ALEXIA 14 DAY SENSOR) MISC     HUMALOG KWIKPEN 100 UNIT/ML soln     No current facility-administered medications for this visit.       Patient Active Problem List   Diagnosis     Diabetes mellitus type 1, controlled (H)      Past Medical History:   Diagnosis Date     Diabetes (H)      HTN (hypertension)      Past Surgical History:   Procedure Laterality Date     NO HISTORY OF SURGERY       Family History   Problem Relation Age of Onset     Thyroid Disease Mother 53     Hypertension Mother      Glaucoma No family hx of      Macular Degeneration No family hx of      Amblyopia No family hx of      Retinal detachment No family hx of      Diabetes No family hx of      Coronary Artery Disease No family hx of      Hyperlipidemia No family hx of       Breast Cancer No family hx of      Colon Cancer No family hx of      Prostate Cancer No family hx of      Social History     Tobacco Use     Smoking status: Never Smoker     Smokeless tobacco: Never Used   Substance Use Topics     Alcohol use: Yes     Alcohol/week: 1.0 standard drinks     Types: 1 Standard drinks or equivalent per week      Patient was seen in conjunction with Cyndi Fu, NP Student.    Vlad Calderon PA-C    The use of Dragon/Vigme dictation services may have been used to construct the content in this note; any grammatical or spelling errors are non-intentional. Please contact the author of this note directly if you are in need of any clarification.

## 2021-03-17 LAB
SARS-COV-2 RNA RESP QL NAA+PROBE: NOT DETECTED
SPECIMEN SOURCE: NORMAL

## 2021-03-20 ENCOUNTER — OFFICE VISIT (OUTPATIENT)
Dept: URGENT CARE | Facility: URGENT CARE | Age: 41
End: 2021-03-20
Payer: COMMERCIAL

## 2021-03-20 VITALS
HEART RATE: 59 BPM | DIASTOLIC BLOOD PRESSURE: 81 MMHG | BODY MASS INDEX: 20.81 KG/M2 | TEMPERATURE: 97 F | SYSTOLIC BLOOD PRESSURE: 130 MMHG | OXYGEN SATURATION: 100 % | WEIGHT: 145 LBS

## 2021-03-20 DIAGNOSIS — B02.9 HERPES ZOSTER WITHOUT COMPLICATION: Primary | ICD-10-CM

## 2021-03-20 PROCEDURE — 99213 OFFICE O/P EST LOW 20 MIN: CPT | Performed by: PHYSICIAN ASSISTANT

## 2021-03-20 RX ORDER — VALACYCLOVIR HYDROCHLORIDE 1 G/1
1000 TABLET, FILM COATED ORAL 3 TIMES DAILY
Qty: 21 TABLET | Refills: 0 | Status: SHIPPED | OUTPATIENT
Start: 2021-03-20 | End: 2021-03-27

## 2021-03-20 NOTE — PROGRESS NOTES
SUBJECTIVE:    Joseph O Thoen is a 41 year old male who presents to the clinic today for a rash.  Onset of rash was 1 day(s) ago.   Rash is sudden onset.  Location of the rash: right lower back   Quality/symptoms of rash: itching and burning   Symptoms are mild and rash seems to be worsening.  Previous history of a similar rash? No  Recent exposure history: none known    Associated symptoms include: nothing.    Past Medical History:   Diagnosis Date     Diabetes (H)      HTN (hypertension)      Current Outpatient Medications   Medication Sig Dispense Refill     acetone, Urine, test STRP 1 each by In Vitro route as needed. 50 strip 3     blood glucose (STAS CONTOUR) test strip Needs these test strips because this is the only meter that works with his pump.  Will need to test 6 times daily 600 strip 3     Blood Pressure Monitor KIT Take bp 2 times a week or PRN problems.  Dx DM 1 kit 0     Continuous Blood Gluc Sensor (DEXCOM G6 SENSOR) MISC Change every 10 days. 9 each 3     Continuous Blood Gluc Transmit (DEXCOM G6 TRANSMITTER) MISC Change every 3 months. 1 each 3     Glucagon (BAQSIMI TWO PACK) 3 MG/DOSE POWD Spray 1 Application in nostril as needed (as needed) Cost checking only 1 each 1     insulin aspart (NOVOLOG VIAL) 100 UNITS/ML vial Pt uses about 50 units per day in the pump 50 mL 3     insulin glargine (LANTUS SOLOSTAR PEN) 100 UNIT/ML pen Pt to be on lantus 24 units daily if pump fails 3 mL 0     lisinopril (ZESTRIL) 5 MG tablet Take 3 tablets (15 mg) by mouth daily Take 3 tablet (15 mg) daily 270 tablet 4     MICROLET LANCETS MISC 1 each 6 times daily 200 each 11     Continuous Blood Gluc  (FREESTYLE ALEXIA 14 DAY READER) SUZIE 1 applicator as needed (use as needed) (Patient not taking: Reported on 8/13/2020) 1 Device 1     Continuous Blood Gluc Sensor (FREESTYLE ALEXIA 14 DAY SENSOR) MISC 1 applicator as needed (prn) (Patient not taking: Reported on 8/13/2020) 8 each 3     HUMALOG KWIKPEN 100  UNIT/ML soln Use as needed if pump fails - uses about 20 units per day (Patient not taking: Reported on 8/13/2020) 15 mL 0     Social History     Tobacco Use     Smoking status: Never Smoker     Smokeless tobacco: Never Used   Substance Use Topics     Alcohol use: Yes     Alcohol/week: 1.0 standard drinks     Types: 1 Standard drinks or equivalent per week       ROS:  10 point ROS negative except as listed above      EXAM:   /81   Pulse 59   Temp 97  F (36.1  C) (Tympanic)   Wt 65.8 kg (145 lb)   SpO2 100%   BMI 20.81 kg/m    GENERAL: alert, no acute distress.  SKIN: Rash description:    Distribution: confluent vesicular rash at posteriolateral aspect approximately L1 dermatome distribution  RESP: lungs clear to auscultation - no rales, rhonchi or wheezes  CV: regular rates and rhythm, normal S1 S2, no murmur noted  NEURO: Normal strength and tone, sensory exam grossly normal,  normal speech and mentation    ASSESSMENT:  (B02.9) Herpes zoster without complication  (primary encounter diagnosis)  Plan: valACYclovir (VALTREX) 1000 mg tablet      Pt to speak with PCP regarding COVID vaccination

## 2021-03-24 NOTE — PROGRESS NOTES
Outcome for 03/24/21 12:30 PM :Left Voicemail for patient to call back.    Outcome for 03/26/21 7:25 AM :Left Voicemail for patient to call back    Joseph O Thoen is a 40 year old male who is being evaluated via a billable telephone visit.      [facility]  Endocrinology  Bushra Rosa MD  3/26/21     Chief Complaint:   Diabetes     This was a virtual visit conducted by [facility] which was converted to a telephone call due to technical issues.  Essentially this was considered a telephone call.  Visit started at 931, visit stopped at 955, documentation time, chart review time, coordination of care 6 minutes, total time spent the day of encounter 30 minutes.    History of Present Illness:   Joseph O Thoen is a 41 year old male with a history of type I diabetes mellitus who presents alone for follow-up of diabetes.    Due to the COVID 19 pandemic this visit was converted to a telephone visit in order to help prevent spread of infection in this high risk patient and the general population .        #1 Type I diabetes mellitus   The patient was initially seen in May 2011 at which time he had a 30 pound weight loss with polyuria, polydipsia, serum glucose of 466, and Hemoglobin A1c of 13.5%.  His C-peptide was 0.8 with a glucose of 318.  DEMARCO antibodies were negative.  He was initially started on Metformin however his blood sugars did not improve therefore he was switched to Lantus and Novolog.  Blood work in September 2011 was significant for negative anti-islet and anti-insulin antibodies.  Since starting insulin, his diabetes has been well controlled with Hemoglobin A1c values all <7%.  He started on a Medtronic insulin pump in July 2012 and switched to the closed-loop system in January 2018.  July 2018 Hemoglobin A1c was 5.9%.  January 2019 was 6.6% at which time he was having issues with automode, only using it about 14% of the time. May 2019 hemoglobin was 6.4% with hypoglycemic episodes twice weekly.  November 2019 A1c  is 6.2%.    Interval history: The patient has not like the Medtronic closed-loop system due to the alarms.  Currently he is on the Medtronic pump with the Dexcom G6 sensor (started in January 2021).  He reports that he is extremely happy with the Dexcom G6 sensor and feels that this has been useful.  He denies any significant problems with hypoglycemia.    Blood Glucose Monitoring:  We do not have his CGM for review.    Current Insulin Pump Settings:  We do not have the current pump settings for review    Diabetes monitoring and complications:  CAD: No  Last eye exam results: 2/13/19, no diabetic retinopathy  Microalbuminuria: negative 1/11/19  Neuropathy: No  HTN: Yes, on Lisinopril   On Statin: No  On Aspirin: No  Depression: No  Erectile dysfunction: No     #2 Low vitamin D  There is a history of low vitamin D.  Patient needs to have his vitamin D rechecked.    #3  Shingles  The patient was diagnosed with shingles roughly 1 week ago.  Currently on Valtrex (1000 mg 3 times daily, x7 days).  He is finishing up his program and feels that he is doing better.    Review of Systems:   Pertinent items are noted in HPI.  All other systems are negative.    Active Medications:   Current Outpatient Medications   Medication Sig Dispense Refill     acetone, Urine, test STRP 1 each by In Vitro route as needed. 50 strip 3     blood glucose (SATS CONTOUR) test strip Needs these test strips because this is the only meter that works with his pump.  Will need to test 6 times daily 600 strip 3     Blood Pressure Monitor KIT Take bp 2 times a week or PRN problems.  Dx DM 1 kit 0     Continuous Blood Gluc  (FREESTYLE ALEXIA 14 DAY READER) SUZIE 1 applicator as needed (use as needed) (Patient not taking: Reported on 8/13/2020) 1 Device 1     Continuous Blood Gluc Sensor (DEXCOM G6 SENSOR) MISC Change every 10 days. 9 each 3     Continuous Blood Gluc Sensor (FREESTYLE ALEXIA 14 DAY SENSOR) MISC 1 applicator as needed (prn) (Patient  not taking: Reported on 8/13/2020) 8 each 3     Continuous Blood Gluc Transmit (DEXCOM G6 TRANSMITTER) MISC Change every 3 months. 1 each 3     Glucagon (BAQSIMI TWO PACK) 3 MG/DOSE POWD Spray 1 Application in nostril as needed (as needed) Cost checking only 1 each 1     HUMALOG KWIKPEN 100 UNIT/ML soln Use as needed if pump fails - uses about 20 units per day (Patient not taking: Reported on 8/13/2020) 15 mL 0     insulin aspart (NOVOLOG VIAL) 100 UNITS/ML vial Pt uses about 50 units per day in the pump 50 mL 3     insulin glargine (LANTUS SOLOSTAR PEN) 100 UNIT/ML pen Pt to be on lantus 24 units daily if pump fails 3 mL 0     lisinopril (ZESTRIL) 5 MG tablet Take 3 tablets (15 mg) by mouth daily Take 3 tablet (15 mg) daily 270 tablet 4     MICROLET LANCETS MISC 1 each 6 times daily 200 each 11     valACYclovir (VALTREX) 1000 mg tablet Take 1 tablet (1,000 mg) by mouth 3 times daily for 7 days 21 tablet 0     Allergies:   Benadryl [altaryl]      Past Medical History:  Type I diabetes mellitus   Hypertension      Past Surgical History:  History reviewed. No pertinent past surgical history.      Family History:   Thyroid disease - mother  Hypertension - mother      Social History:   The patient works at the [employer].     Physical Exam:   Vital signs and physical exam not available.  However the patient reports feeling well. Psych: Alert and oriented times 3; coherent speech, normal  rate and volume, able to articulate logical thoughts, able to abstract reason, no tangential thoughts, no hallucinations or delusions     Data:  No visits with results within 1 Month(s) from this visit.   Latest known visit with results is:   Office Visit on 11/01/2019   Component Date Value Ref Range Status     Hemoglobin A1C 11/01/2019 6.2* 4.3 - 6.0 % Final         Assessment and Plan:  #1 Type I diabetes mellitus   His last hemoglobin A1c was in November 2019 at 6.2.   He is on lisinopril at 15 mg/day.     I do not have  his pump or CGM for review.  We will have our team help him obtain this information for me to review.  However he is overall reporting that he is doing well and is very satisfied with his program, especially his Dexcom G6.  He is very interested in switching to the T slim pump when his Medtronic pump runs out of warranty in September 2021    Patient to do labs to follow-up his diabetes.  Our team will reach out to him about his pump and sensor downloads.  We will plan for return visit in 6 months with diabetes Ed to plan for transition to the T slim pump.    #2 Low vitamin D  We will recheck vitamin D level.      #3  Zoster  Patient has resolving zoster.  Patient to finish his Valtrex (on 7 days) doing better.  Patient to consider Covid vaccine about 2 weeks after finishing his Valtrex.    Follow-up: 6 months, patient to do labs within the next month for hemoglobin A1c, TSH, free T4, urine for microalbumin, vitamin D he will arrange.    This was a virtual visit conducted by [facility] which was converted to a telephone call due to technical issues.  Essentially this was considered a telephone call.  Visit started at 931, visit stopped at 955, documentation time, chart review time, coordination of care 6 minutes, total time spent the day of encounter 30 minutes.

## 2021-03-26 ENCOUNTER — VIRTUAL VISIT (OUTPATIENT)
Dept: ENDOCRINOLOGY | Facility: CLINIC | Age: 41
End: 2021-03-26
Payer: COMMERCIAL

## 2021-03-26 ENCOUNTER — TELEPHONE (OUTPATIENT)
Dept: ENDOCRINOLOGY | Facility: CLINIC | Age: 41
End: 2021-03-26

## 2021-03-26 DIAGNOSIS — E10.9 CONTROLLED DIABETES MELLITUS TYPE 1 WITHOUT COMPLICATIONS (H): ICD-10-CM

## 2021-03-26 PROCEDURE — 99214 OFFICE O/P EST MOD 30 MIN: CPT | Mod: 95 | Performed by: INTERNAL MEDICINE

## 2021-03-26 RX ORDER — LISINOPRIL 5 MG/1
15 TABLET ORAL DAILY
Qty: 270 TABLET | Refills: 4 | Status: SHIPPED | OUTPATIENT
Start: 2021-03-26 | End: 2021-10-08

## 2021-03-26 RX ORDER — INSULIN LISPRO 100 [IU]/ML
INJECTION, SOLUTION INTRAVENOUS; SUBCUTANEOUS
Qty: 15 ML | Refills: 0 | Status: SHIPPED | OUTPATIENT
Start: 2021-03-26 | End: 2022-04-05

## 2021-03-26 RX ORDER — GLUCAGON 3 MG/1
1 POWDER NASAL PRN
Qty: 1 EACH | Refills: 1 | Status: SHIPPED | OUTPATIENT
Start: 2021-03-26

## 2021-03-26 RX ORDER — INSULIN LISPRO 100 [IU]/ML
INJECTION, SOLUTION INTRAVENOUS; SUBCUTANEOUS
Qty: 15 ML | Refills: 0
Start: 2021-03-26 | End: 2021-03-26

## 2021-03-26 NOTE — PATIENT INSTRUCTIONS
Call local [facility] to get the labs ordered    [place] vaccine connect    [facility] vaccine    [place] vaccine [place] on facebook

## 2021-03-26 NOTE — LETTER
3/26/2021       RE: Joseph O Thoen  [address] [facility] [address]     Dear Colleague,    Thank you for referring your patient, Joseph O Thoen, to the [facility] ENDOCRINOLOGY [facility]IN[facility] [place] at [facility]. Please see a copy of my visit note below.    Outcome for 03/24/21 12:30 PM :Left Voicemail for patient to call back.    Outcome for 03/26/21 7:25 AM :Left Voicemail for patient to call back    Joseph O Thoen is a 40 year old male who is being evaluated via a billable telephone visit.      [facility]  Endocrinology  Bushra Rosa MD  3/26/21     Chief Complaint:   Diabetes     This was a virtual visit conducted by Jerald which was converted to a telephone call due to technical issues.  Essentially this was considered a telephone call.  Visit started at 931, visit stopped at 955, documentation time, chart review time, coordination of care 6 minutes, total time spent the day of encounter 30 minutes.    History of Present Illness:   Joseph O Thoen is a 41 year old male with a history of type I diabetes mellitus who presents alone for follow-up of diabetes.    Due to the COVID 19 pandemic this visit was converted to a telephone visit in order to help prevent spread of infection in this high risk patient and the general population .        #1 Type I diabetes mellitus   The patient was initially seen in May 2011 at which time he had a 30 pound weight loss with polyuria, polydipsia, serum glucose of 466, and Hemoglobin A1c of 13.5%.  His C-peptide was 0.8 with a glucose of 318.  DEMARCO antibodies were negative.  He was initially started on Metformin however his blood sugars did not improve therefore he was switched to Lantus and Novolog.  Blood work in September 2011 was significant for negative anti-islet and anti-insulin antibodies.  Since starting insulin, his diabetes has been well controlled with Hemoglobin A1c values all <7%.  He started on a  Medtronic insulin pump in July 2012 and switched to the closed-loop system in January 2018.  July 2018 Hemoglobin A1c was 5.9%.  January 2019 was 6.6% at which time he was having issues with automode, only using it about 14% of the time. May 2019 hemoglobin was 6.4% with hypoglycemic episodes twice weekly.  November 2019 A1c is 6.2%.    Interval history: The patient has not like the Medtronic closed-loop system due to the alarms.  Currently he is on the Medtronic pump with the Dexcom G6 sensor (started in January 2021).  He reports that he is extremely happy with the Dexcom G6 sensor and feels that this has been useful.  He denies any significant problems with hypoglycemia.    Blood Glucose Monitoring:  We do not have his CGM for review.    Current Insulin Pump Settings:  We do not have the current pump settings for review    Diabetes monitoring and complications:  CAD: No  Last eye exam results: 2/13/19, no diabetic retinopathy  Microalbuminuria: negative 1/11/19  Neuropathy: No  HTN: Yes, on Lisinopril   On Statin: No  On Aspirin: No  Depression: No  Erectile dysfunction: No     #2 Low vitamin D  There is a history of low vitamin D.  Patient needs to have his vitamin D rechecked.    #3  Shingles  The patient was diagnosed with shingles roughly 1 week ago.  Currently on Valtrex (1000 mg 3 times daily, x7 days).  He is finishing up his program and feels that he is doing better.    Review of Systems:   Pertinent items are noted in HPI.  All other systems are negative.    Active Medications:   Current Outpatient Medications   Medication Sig Dispense Refill     acetone, Urine, test STRP 1 each by In Vitro route as needed. 50 strip 3     blood glucose (STAS CONTOUR) test strip Needs these test strips because this is the only meter that works with his pump.  Will need to test 6 times daily 600 strip 3     Blood Pressure Monitor KIT Take bp 2 times a week or PRN problems.  Dx DM 1 kit 0     Continuous Blood Gluc   (FREESTYLE ALEXIA 14 DAY READER) SUZIE 1 applicator as needed (use as needed) (Patient not taking: Reported on 8/13/2020) 1 Device 1     Continuous Blood Gluc Sensor (DEXCOM G6 SENSOR) MISC Change every 10 days. 9 each 3     Continuous Blood Gluc Sensor (FREESTYLE ALEXIA 14 DAY SENSOR) MISC 1 applicator as needed (prn) (Patient not taking: Reported on 8/13/2020) 8 each 3     Continuous Blood Gluc Transmit (DEXCOM G6 TRANSMITTER) MISC Change every 3 months. 1 each 3     Glucagon (BAQSIMI TWO PACK) 3 MG/DOSE POWD Spray 1 Application in nostril as needed (as needed) Cost checking only 1 each 1     HUMALOG KWIKPEN 100 UNIT/ML soln Use as needed if pump fails - uses about 20 units per day (Patient not taking: Reported on 8/13/2020) 15 mL 0     insulin aspart (NOVOLOG VIAL) 100 UNITS/ML vial Pt uses about 50 units per day in the pump 50 mL 3     insulin glargine (LANTUS SOLOSTAR PEN) 100 UNIT/ML pen Pt to be on lantus 24 units daily if pump fails 3 mL 0     lisinopril (ZESTRIL) 5 MG tablet Take 3 tablets (15 mg) by mouth daily Take 3 tablet (15 mg) daily 270 tablet 4     MICROLET LANCETS MISC 1 each 6 times daily 200 each 11     valACYclovir (VALTREX) 1000 mg tablet Take 1 tablet (1,000 mg) by mouth 3 times daily for 7 days 21 tablet 0     Allergies:   Benadryl [altaryl]      Past Medical History:  Type I diabetes mellitus   Hypertension      Past Surgical History:  History reviewed. No pertinent past surgical history.      Family History:   Thyroid disease - mother  Hypertension - mother      Social History:   The patient works at the [employer].     Physical Exam:   Vital signs and physical exam not available.  However the patient reports feeling well. Psych: Alert and oriented times 3; coherent speech, normal  rate and volume, able to articulate logical thoughts, able to abstract reason, no tangential thoughts, no hallucinations or delusions     Data:  No visits with results within 1 Month(s) from this visit.    Latest known visit with results is:   Office Visit on 11/01/2019   Component Date Value Ref Range Status     Hemoglobin A1C 11/01/2019 6.2* 4.3 - 6.0 % Final     Assessment and Plan:  #1 Type I diabetes mellitus   His last hemoglobin A1c was in November 2019 at 6.2.   He is on lisinopril at 15 mg/day.     I do not have his pump or CGM for review.  We will have our team help him obtain this information for me to review.  However he is overall reporting that he is doing well and is very satisfied with his program, especially his Dexcom G6.  He is very interested in switching to the T slim pump when his Medtronic pump runs out of warranty in September 2021    Patient to do labs to follow-up his diabetes.  Our team will reach out to him about his pump and sensor downloads.  We will plan for return visit in 6 months with diabetes Ed to plan for transition to the T slim pump.    #2 Low vitamin D  We will recheck vitamin D level.      #3  Zoster  Patient has resolving zoster.  Patient to finish his Valtrex (on 7 days) doing better.  Patient to consider Covid vaccine about 2 weeks after finishing his Valtrex.    Follow-up: 6 months, patient to do labs within the next month for hemoglobin A1c, TSH, free T4, urine for microalbumin, vitamin D he will arrange.    This was a virtual visit conducted by Jerald which was converted to a telephone call due to technical issues.  Essentially this was considered a telephone call.  Visit started at 931, visit stopped at 955, documentation time, chart review time, coordination of care 6 minutes, total time spent the day of encounter 30 minutes.    Again, thank you for allowing me to participate in the care of your patient.      Sincerely,    Bushra Rosa MD

## 2021-04-06 ENCOUNTER — TELEPHONE (OUTPATIENT)
Dept: ENDOCRINOLOGY | Facility: CLINIC | Age: 41
End: 2021-04-06

## 2021-04-06 NOTE — TELEPHONE ENCOUNTER
----- Message from Jacinta Harrison MA sent at 4/6/2021  1:45 PM CDT -----    ----- Message -----  From: Terrance Bailey CMA  Sent: 4/6/2021   1:44 PM CDT  To: Jacinta Harrison MA    Called patient still No answer left a voice mailed  ----- Message -----  From: Jacinta Harrison MA  Sent: 4/5/2021   4:59 PM CDT  To: Terrance Bailey CMA      ----- Message -----  From: Bushra Rosa MD  Sent: 4/5/2021   4:45 PM CDT  To: [facility]      ----- Message -----  From: Bushra Rosa MD  Sent: 4/5/2021  To: Bushra Rosa MD    The pump settings and Dexcom's sensor results get sent to me for review?

## 2021-04-12 ENCOUNTER — TELEPHONE (OUTPATIENT)
Dept: ENDOCRINOLOGY | Facility: CLINIC | Age: 41
End: 2021-04-12

## 2021-04-12 NOTE — TELEPHONE ENCOUNTER
Called pt  - left message.  I do not have his pump results for review.  That being said, he can increase his overnight basal rate (from midnight to 8 AM) by roughly 0.1 units/h (for example if he was at 0.8 units/h, he can increase to 0.9 units/h). Pt to call back if questions.     --  CGM reviewed.  Patient using Dexcom G6.  Average glucose 150.  Estimated hemoglobin A1c 6.9.  Patient is 73% in range.  He does have some hyperglycemia particularly overnight from about 3 AM to 8 AM.

## 2021-04-17 ENCOUNTER — HEALTH MAINTENANCE LETTER (OUTPATIENT)
Age: 41
End: 2021-04-17

## 2021-04-18 DIAGNOSIS — E10.9 CONTROLLED DIABETES MELLITUS TYPE 1 WITHOUT COMPLICATIONS (H): ICD-10-CM

## 2021-04-19 NOTE — TELEPHONE ENCOUNTER
3/26/2021  [facility] Endocrinology Clinic [place]   Bushra Rosa MD  Endocrinology, Diabetes, and Metabolism     Test strips

## 2021-04-28 ENCOUNTER — TELEPHONE (OUTPATIENT)
Dept: ENDOCRINOLOGY | Facility: CLINIC | Age: 41
End: 2021-04-28

## 2021-04-28 ENCOUNTER — MYC MEDICAL ADVICE (OUTPATIENT)
Dept: ENDOCRINOLOGY | Facility: CLINIC | Age: 41
End: 2021-04-28

## 2021-04-28 DIAGNOSIS — E10.9 CONTROLLED DIABETES MELLITUS TYPE 1 WITHOUT COMPLICATIONS (H): ICD-10-CM

## 2021-04-28 NOTE — TELEPHONE ENCOUNTER
NILSA SEAMAN   [facility] contour next strips testing 6 times a day .90 day supply 600   Type 1 Diabetes   Uses a Medtronic insulin pump that only communicates with the Prixing contour next lynk meter .   Mail order [facility]   Urgent he is out Hyacinth Aguila RN on 4/28/2021 at 9:54 AM

## 2021-05-03 ENCOUNTER — TELEPHONE (OUTPATIENT)
Dept: ENDOCRINOLOGY | Facility: CLINIC | Age: 41
End: 2021-05-03

## 2021-05-03 NOTE — TELEPHONE ENCOUNTER
----- Message from Bushra Rosa MD sent at 4/12/2021  1:08 PM CDT -----  How does pt feel that his blood sugars are doing? Did he increase his basal rate overnight? Could you get his updated pump settings for me?

## 2021-05-03 NOTE — TELEPHONE ENCOUNTER
No answer on phone but left a message that I will send a My chart message if he could respond . Hyacinth Aguila RN on 5/3/2021 at 12:24 PM

## 2021-06-01 ENCOUNTER — TELEPHONE (OUTPATIENT)
Dept: ENDOCRINOLOGY | Facility: CLINIC | Age: 41
End: 2021-06-01

## 2021-06-01 NOTE — TELEPHONE ENCOUNTER
Patient reports glycemic control is going well.  Patient has follow-up with diabetes educator.    If patient wants, he can increase his carb ratio starting at 7 AM.  However I will defer to him.  -  Pump settings as noted below:   11 PM to 3:30 AM: 0.575 units/h  3:30 AM to 11 PM: 0.65 units/h    Total daily basal rate of 15.262 units    Insulin sensitivity:  9:30 PM to 6 AM: 1 per 60  6 AM to 9:30 PM: 1 per 50    Carb ratio:  10 AM to 9 PM: 1 per 10 g carbohydrate  9 PM to 10 AM: 1 per 12 g carbohydrate

## 2021-06-29 ENCOUNTER — TELEPHONE (OUTPATIENT)
Dept: ENDOCRINOLOGY | Facility: CLINIC | Age: 41
End: 2021-06-29

## 2021-06-29 DIAGNOSIS — E10.9 CONTROLLED DIABETES MELLITUS TYPE 1 WITHOUT COMPLICATIONS (H): ICD-10-CM

## 2021-06-29 NOTE — TELEPHONE ENCOUNTER
New order sent to [facility] mail order  For Tosha contour next strips  Hyacinth Aguila RN on 6/29/2021 at 2:33 PM

## 2021-06-29 NOTE — TELEPHONE ENCOUNTER
[facility] Call Center    Phone Message    May a detailed message be left on voicemail: no     Reason for Call: Medication Question or concern regarding medication   Prescription Clarification  Name of Medication: blood glucose (STAS CONTOUR) test strip  Prescribing Provider: Chow   Pharmacy: "[facility]   What on the order needs clarification? Richelle pharmacy tech with "[facility] pharmacy is requesting the Rx and authorization be sent to them/call them. The Rx was sent to [facility], and it should have been sent to [email or web address] mail order home delivery pharmacy. Also please include and confirm the name of the test strips. Call: [phone] or [place] address: [address] [place] [address] [place] [address]          Action Taken: Message routed to:  [facility] endo    Travel Screening: Not Applicable

## 2021-08-07 ENCOUNTER — HEALTH MAINTENANCE LETTER (OUTPATIENT)
Age: 41
End: 2021-08-07

## 2021-09-16 NOTE — PROGRESS NOTES
Assessment/Plan  1. Type 1 diabetes mellitus without retinopathy OU   Educated patient on clinical findings and the importance of continued management with primary care physician. Continue management as directed and return to clinic in 1 year for dilated exam, or sooner, as needed.  2. Simple myopia OU   Educated patient on condition and clinical findings. Dispensed spectacle prescription for full time wear. Educated patient on possibility of adaptation period, if symptoms do not improve return to clinic for further testing. Monitor annually.  3. Meibomian gland dysfunction OU   Educated patient on condition and clinical findings. Recommended warm compresses twice each day for ten minutes. Monitor annually, or sooner if symptoms worsen.    Complete documentation of historical and exam elements from today's encounter can  be found in the full encounter summary report (not reduplicated in this progress  note). I personally obtained the chief complaint(s) and history of present illness. I  confirmed and edited as necessary the review of systems, past medical/surgical  history, family history, social history, and examination findings as documented by  others; and I examined the patient myself. I personally reviewed the relevant tests,  images, and reports as documented above. I formulated and edited as necessary the  assessment and plan and discussed the findings and management plan with the  patient and family.    Brodie Cheek OD, FAAO  
16-Sep-2021 05:13
16-Sep-2021 17:14

## 2021-09-27 ENCOUNTER — VIRTUAL VISIT (OUTPATIENT)
Dept: EDUCATION SERVICES | Facility: CLINIC | Age: 41
End: 2021-09-27
Payer: COMMERCIAL

## 2021-09-27 DIAGNOSIS — E10.9 DIABETES MELLITUS TYPE 1, CONTROLLED (H): Primary | ICD-10-CM

## 2021-09-27 PROCEDURE — 99207 PR NO CHARGE LOS: CPT | Performed by: REGISTERED NURSE

## 2021-09-27 NOTE — PROGRESS NOTES
Video-Visit Details    Type of service:  Video Visit  Patient consented to video visit  Video Start Time:  910  Video End Time:   931  Originating Location (pt. Location): Home  Distant Location (provider location):   [facility] DIABETES EDUCATION [place]   Platform used for Video Visit: [employer]agara     Diabetes Self-Management Education & Support    Yung presents today for education related to Type 1 diabetes    Patient is being treated with:  insulin pump  Yung is accompanied by self    Year of diagnosis:   Referring provider:  Bushra Rosa MD  Living Situation: Lives with wife  Employment: He works for the [employer]     DIABETES EDUCATOR NOTE:    Purpose of today's visit:  He believes his current pump, the Medtronic 670G, is almost out of warranty, and he would like to make the transition to a Tandem X2 pump with Control IQ.  He has stopped using the Medtronic sensor out of frustration with the number of alarms and the amount of time he has to interact with the pump for calibrations,etc.   Our records show that he started using his Medtronic pump in 2017, so it would appear that his warranty would be due to  at the end of October or early November.  He is going to check with Medtronic on the actual date.       Subjective/Objective:  Overall feels that he is doing well.  His last visit with his endocrinologist, Dr. Rosa, was in 2021.  His last A1C was done in 2019.  This will probably not be current enough for insurance approval so A1C ordered today and he should be able to get this done sometime in the next month or so.     Assessment/Plan:  Reviewed the process of upgrading his insulin pump.    He is going to inquire on-line to get things started.    Order for A1C was placed today.    Once the A1C has been reported, we should be able to submit his CMN  We will arrange training at that time.          Any diabetes medication dose changes were made via the CDE Protocol and  Collaborative Practice Agreement. A copy of this encounter was provided to the patient's referring provider      Time spent in this visit:  Less than 30 minutes.  No charges.

## 2021-10-02 ENCOUNTER — HEALTH MAINTENANCE LETTER (OUTPATIENT)
Age: 41
End: 2021-10-02

## 2021-10-07 NOTE — PROGRESS NOTES
Joseph O Thoen is a 41 year old male who is being evaluated via a billable video visit.    Video start time: 09:54 am  Video end time: 10:35 am  Documentation time and coordination of care: 10 minutes - total time 30 minutes     Chief Complaint:   Diabetes     History of Present Illness:   Joseph O Thoen is a 41 year old male with a history of type I diabetes mellitus who presents alone for follow-up of diabetes.    #1 Type I diabetes mellitus   The patient was initially seen in May 2011 at which time he had a 30 pound weight loss with polyuria, polydipsia, serum glucose of 466, and Hemoglobin A1c of 13.5%.  His C-peptide was 0.8 with a glucose of 318.  DEMARCO antibodies were negative.  He was initially started on Metformin however his blood sugars did not improve therefore he was switched to Lantus and Novolog.  Blood work in September 2011 was significant for negative anti-islet and anti-insulin antibodies.  Since starting insulin, his diabetes has been well controlled with Hemoglobin A1c values all <7%.  He started on a Medtronic insulin pump in July 2012 and switched to the closed-loop system in January 2018.  The patient did not like the Medtronic closed-loop system due to the alarms.  so he is on the Medtronic pump with the Dexcom G6 sensor (started in January 2021).  He reports that he is extremely happy with the Dexcom G6 sensor and feels that this has been useful.      Interval history: He is working with Cindy garcia to get Tandem pump. His Medtronic warranty would run out on 10/28/21. He is planning to switch to Tandem.  Com results reviewed.  Average glucose 131.  71% in range.  Estimated hemoglobin A1c is 6.4.  October 2021 hemoglobin A1c of 5.9.  Notably, he tends to have hypoglycemia after eating breakfast.    Blood Glucose Monitoring:  Dexcom G6  He was out of Dexcom sendor for 2 weeks and had been using fingerstick at that time. Now using dexcom since yesterday  Reports fasting BG around 110- 180  But  after breakfast he would go low to 60s  This happens at least twice a week. Lowest BG was 40s. He never had severe hypoglycemia requiring assistance. He is able to feel low BG. Denies polys    Current Insulin Pump Settings:    Medtronic 670 G  Manual mode 100 %  Bolus 25.2 unit(s) (61%)  Basal 15.9 unit(s) (39%)    Pump settings as noted below:   MN to 3:30 AM: 0.65 units/h  3:30 AM to 10 AM: 0.65 units/h  10 AM to 6 PM: 0.70 unit(s)/h  6PM to 11PM: 0.65 unit(s)/h  11PM to MN: 0.575 unit(s)/h        Insulin sensitivity:  MN: 60  6 AM:  50  11:30 PM: 60     Carb ratio:  MN to 10 am: 1 per 12 g carbohydrate  10 AM to 9 PM: 1 per 10 g carbohydrate  9 PM to MN: 1 per 12 g carbohydrate    Active insulin time : 3:30 hr:m    3 meals   BF: oatmeal, orange juice  Lunch lentil sloppy tony  Dinner: Home made pizza  Snack: some pretzels, not alot    Diabetes monitoring and complications:  CAD: No  Last eye exam results: 2/13/19, no diabetic retinopathy  Microalbuminuria: negative 1/11/19  Neuropathy: No  HTN: Yes, on Lisinopril   On Statin: No  On Aspirin: No  Depression: No  Erectile dysfunction: No     #2 Low vitamin D  There is a history of low vitamin D.  Patient needs to have his vitamin D rechecked.      Review of Systems:   Pertinent items are noted in HPI.  All other systems are negative.    Active Medications:   Current Outpatient Medications   Medication Sig Dispense Refill     acetone, Urine, test STRP 1 each by In Vitro route as needed. 50 strip 3     blood glucose (TOSHA CONTOUR) test strip strips work with meter / pump. Test 6 times daily  Tosha contour  NEXT strips 600 strip 3     Blood Pressure Monitor KIT Take bp 2 times a week or PRN problems.  Dx DM 1 kit 0     Continuous Blood Gluc Sensor (DEXCOM G6 SENSOR) MISC Change every 10 days. 9 each 3     Continuous Blood Gluc Transmit (DEXCOM G6 TRANSMITTER) MISC Change every 3 months. 1 each 3     Glucagon (BAQSIMI TWO PACK) 3 MG/DOSE POWD Spray 1 Application in  "nostril as needed (as needed) Cost checking only 1 each 1     HUMALOG KWIKPEN 100 UNIT/ML soln Use as needed if pump fails - uses about 20 units per day 15 mL 0     insulin aspart (NOVOLOG VIAL) 100 UNITS/ML vial Pt uses about 50 units per day in the pump 50 mL 3     insulin glargine (LANTUS PEN) 100 UNIT/ML pen Pt to be on lantus 24 units daily if pump fails 15 mL 0     lisinopril (ZESTRIL) 5 MG tablet Take 3 tablets (15 mg) by mouth daily Take 3 tablet (15 mg) daily 270 tablet 4     MICROLET LANCETS MISC 1 each 6 times daily 200 each 11     valACYclovir (VALTREX) 1000 mg tablet Take 1 tablet (1,000 mg) by mouth 3 times daily for 7 days 21 tablet 0     Allergies:   Benadryl [altaryl]      Past Medical History:  Type I diabetes mellitus   Hypertension      Past Surgical History:  History reviewed. No pertinent past surgical history.      Family History:   Thyroid disease - mother  Hypertension - mother      Social History:   The patient works at the [employer].     Physical Exam:     GENERAL: Healthy, alert and no distress\",\"EYES: Eyes grossly normal to inspection.  No discharge or erythema, or obvious scleral/conjunctival abnormalities.\",\"RESP: No audible wheeze, cough, or visible cyanosis.  No visible retractions or increased work of breathing.  \",\"SKIN: Visible skin clear. No significant rash, abnormal pigmentation or lesions.\",\"NEURO: Cranial nerves grossly intact.  Mentation and speech appropriate for age.\",\"PSYCH: Mentation appears normal, affect normal/bright, judgement and insight intact, normal speech and appearance well-groomed.       Data:  Lab on 10/08/2021   Component Date Value Ref Range Status     Hemoglobin A1C 10/08/2021 5.9* 0.0 - 5.6 % Final    Normal <5.7%   Prediabetes 5.7-6.4%    Diabetes 6.5% or higher     Note: Adopted from ADA consensus guidelines.           Assessment and Plan:  #1 Type I diabetes mellitus   His last hemoglobin A1c today 10/8/21 is 5.9%    He is very interested in " switching to the T slim pump and is working on it.  In the meantime, we will change his pump settings slightly to avoid hypoglycemia after eating breakfast.    Changes today:    MN to 3:30 AM: 0.65 units/h  3:30 AM to 10 AM: 0.65 units/h  10 AM to 6 PM: 0.65 unit(s)/h  6PM to 11PM: 0.75 unit(s)/h  11PM to MN: 0.65 unit(s)/h     Insulin sensitivity: no changes     Carb ratio:  midnight - 6 am: 1 unit for 20 g carb  6 am - 11 am: 1 unit for 14 g carb  11 am - 9 pm: 1 unit for 10 g carb  9pm - midnight: 1 unit for 20 g carb    Active insulin time : 3:30 hr:m---> 4 hours      Insulin sensitivity:  MN: 60  6 AM:  50  11:30 PM: 60     Follow-up: 3 months as a face-to-face visit with labs (ordered below) at that time    Orders Placed This Encounter   Procedures     Hemoglobin A1c     Albumin Random Urine Quantitative with Creat Ratio     TSH     Lipid Profile     Tissue transglutaminase rylee IgA and IgG     Basic metabolic panel     Adult Eye Referral       The case was discussed with my attending, Dr. Moe Salvador MD  PGY-5 Endocrine Fellow  Pager # [phone]    I have seen and examined the patient by video visit and agree with the fellow's plan of care as noted.  October 8, 2021    Dr. Bushra Rosa [phone]    Video start time: 09:54 am  Video end time: 10:35 am  Documentation time and coordination of care: 10 minutes - total time 30 minutes

## 2021-10-08 ENCOUNTER — LAB (OUTPATIENT)
Dept: LAB | Facility: CLINIC | Age: 41
End: 2021-10-08
Payer: COMMERCIAL

## 2021-10-08 ENCOUNTER — VIRTUAL VISIT (OUTPATIENT)
Dept: ENDOCRINOLOGY | Facility: CLINIC | Age: 41
End: 2021-10-08
Payer: COMMERCIAL

## 2021-10-08 DIAGNOSIS — E10.9 DIABETES MELLITUS TYPE 1, CONTROLLED (H): ICD-10-CM

## 2021-10-08 DIAGNOSIS — E10.9 CONTROLLED DIABETES MELLITUS TYPE 1 WITHOUT COMPLICATIONS (H): Primary | ICD-10-CM

## 2021-10-08 DIAGNOSIS — E10.9 CONTROLLED DIABETES MELLITUS TYPE 1 WITHOUT COMPLICATIONS (H): ICD-10-CM

## 2021-10-08 LAB — HBA1C MFR BLD: 5.9 % (ref 0–5.6)

## 2021-10-08 PROCEDURE — 36415 COLL VENOUS BLD VENIPUNCTURE: CPT | Performed by: PATHOLOGY

## 2021-10-08 PROCEDURE — 83036 HEMOGLOBIN GLYCOSYLATED A1C: CPT | Performed by: PATHOLOGY

## 2021-10-08 PROCEDURE — 99214 OFFICE O/P EST MOD 30 MIN: CPT | Mod: 95 | Performed by: INTERNAL MEDICINE

## 2021-10-08 RX ORDER — LISINOPRIL 5 MG/1
15 TABLET ORAL DAILY
Qty: 270 TABLET | Refills: 4 | Status: SHIPPED | OUTPATIENT
Start: 2021-10-08 | End: 2022-07-15

## 2021-10-08 NOTE — PATIENT INSTRUCTIONS
We appreciate your assistance in coordinating your healthcare.     Please upload your insulin pump, blood sugar meter and/or continuous glucose monitor at home 1-2 days before your next diabetes-related appointment.   This will allow your provider to review your  data before your scheduled virtual visit.    To ask a question to your Endocrine care team, please send them a [employer] message, or reach them by phone at [phone]     To expedite your medication refill(s), please contact your pharmacy and have them   fax a refill request to: [phone].  *Please allow 3 business days for routine medication refills.  *Please allow 5 business days for controlled substance medication refills.    For after-hours urgent Endocrine issues, that do not require 911, please dial [phone], and ask to speak with the Endocrinologist On-Call      Change the insulin pump settings as follows:  Basal:  All day- 0.650   6 pm- 0.750    Bolus: midnight - 6 am: 20   6 am - 11 am: 14   11 am - 9 pm: 10   9pm - midnight: 20    ISF: keep the same    Active insulin time: change to 4 hours

## 2021-10-08 NOTE — PROGRESS NOTES
Outcome for 10/08/21 9:09 AM :Glucose sent via Email    Yung is a 41 year old who is being evaluated via a billable video visit.      How would you like to obtain your AVS? MyChart  If the video visit is dropped, the invitation should be resent by: Send to e-mail at: [email or web address]  Will anyone else be joining your video visit? Franny Moore, EMT

## 2021-10-08 NOTE — LETTER
10/8/2021       RE: Joseph O Thoen  [address] [facility] [place] [address]     Dear Colleague,    Thank you for referring your patient, Joseph O Thoen, to the [facility] [place] [facility]. Please see a copy of my visit note below.    Joseph O Thoen is a 41 year old male who is being evaluated via a billable video visit.    Video start time: 09:54 am  Video end time: 10:35 am  Documentation time and coordination of care: 10 minutes - total time 30 minutes     Chief Complaint:   Diabetes     History of Present Illness:   Joseph O Thoen is a 41 year old male with a history of type I diabetes mellitus who presents alone for follow-up of diabetes.    #1 Type I diabetes mellitus   The patient was initially seen in May 2011 at which time he had a 30 pound weight loss with polyuria, polydipsia, serum glucose of 466, and Hemoglobin A1c of 13.5%.  His C-peptide was 0.8 with a glucose of 318.  DEMARCO antibodies were negative.  He was initially started on Metformin however his blood sugars did not improve therefore he was switched to Lantus and Novolog.  Blood work in September 2011 was significant for negative anti-islet and anti-insulin antibodies.  Since starting insulin, his diabetes has been well controlled with Hemoglobin A1c values all <7%.  He started on a Medtronic insulin pump in July 2012 and switched to the closed-loop system in January 2018.  The patient did not like the Medtronic closed-loop system due to the alarms.  so he is on the Medtronic pump with the Dexcom G6 sensor (started in January 2021).  He reports that he is extremely happy with the Dexcom G6 sensor and feels that this has been useful.      Interval history: He is working with Cindy garcia to get Tandem pump. His Medtronic warranty would run out on 10/28/21. He is planning to switch to Tandem.  Com results reviewed.  Average glucose 131.  71% in range.  Estimated  hemoglobin A1c is 6.4.  October 2021 hemoglobin A1c of 5.9.  Notably, he tends to have hypoglycemia after eating breakfast.    Blood Glucose Monitoring:  Dexcom G6  He was out of Dexcom sendor for 2 weeks and had been using fingerstick at that time. Now using dexcom since yesterday  Reports fasting BG around 110- 180  But after breakfast he would go low to 60s  This happens at least twice a week. Lowest BG was 40s. He never had severe hypoglycemia requiring assistance. He is able to feel low BG. Denies polys    Current Insulin Pump Settings:    Medtronic 670 G  Manual mode 100 %  Bolus 25.2 unit(s) (61%)  Basal 15.9 unit(s) (39%)    Pump settings as noted below:   MN to 3:30 AM: 0.65 units/h  3:30 AM to 10 AM: 0.65 units/h  10 AM to 6 PM: 0.70 unit(s)/h  6PM to 11PM: 0.65 unit(s)/h  11PM to MN: 0.575 unit(s)/h        Insulin sensitivity:  MN: 60  6 AM:  50  11:30 PM: 60     Carb ratio:  MN to 10 am: 1 per 12 g carbohydrate  10 AM to 9 PM: 1 per 10 g carbohydrate  9 PM to MN: 1 per 12 g carbohydrate    Active insulin time : 3:30 hr:m    3 meals   BF: oatmeal, orange juice  Lunch lentil sloppy tony  Dinner: Home made pizza  Snack: some pretzels, not alot    Diabetes monitoring and complications:  CAD: No  Last eye exam results: 2/13/19, no diabetic retinopathy  Microalbuminuria: negative 1/11/19  Neuropathy: No  HTN: Yes, on Lisinopril   On Statin: No  On Aspirin: No  Depression: No  Erectile dysfunction: No     #2 Low vitamin D  There is a history of low vitamin D.  Patient needs to have his vitamin D rechecked.      Review of Systems:   Pertinent items are noted in HPI.  All other systems are negative.    Active Medications:   Current Outpatient Medications   Medication Sig Dispense Refill     acetone, Urine, test STRP 1 each by In Vitro route as needed. 50 strip 3     blood glucose (TOSHA CONTOUR) test strip strips work with meter / pump. Test 6 times daily  Tosha contour  NEXT strips 600 strip 3     Blood Pressure  "Monitor KIT Take bp 2 times a week or PRN problems.  Dx DM 1 kit 0     Continuous Blood Gluc Sensor (DEXCOM G6 SENSOR) MISC Change every 10 days. 9 each 3     Continuous Blood Gluc Transmit (DEXCOM G6 TRANSMITTER) MISC Change every 3 months. 1 each 3     Glucagon (BAQSIMI TWO PACK) 3 MG/DOSE POWD Spray 1 Application in nostril as needed (as needed) Cost checking only 1 each 1     HUMALOG KWIKPEN 100 UNIT/ML soln Use as needed if pump fails - uses about 20 units per day 15 mL 0     insulin aspart (NOVOLOG VIAL) 100 UNITS/ML vial Pt uses about 50 units per day in the pump 50 mL 3     insulin glargine (LANTUS PEN) 100 UNIT/ML pen Pt to be on lantus 24 units daily if pump fails 15 mL 0     lisinopril (ZESTRIL) 5 MG tablet Take 3 tablets (15 mg) by mouth daily Take 3 tablet (15 mg) daily 270 tablet 4     MICROLET LANCETS MISC 1 each 6 times daily 200 each 11     valACYclovir (VALTREX) 1000 mg tablet Take 1 tablet (1,000 mg) by mouth 3 times daily for 7 days 21 tablet 0     Allergies:   Benadryl [altaryl]      Past Medical History:  Type I diabetes mellitus   Hypertension      Past Surgical History:  History reviewed. No pertinent past surgical history.      Family History:   Thyroid disease - mother  Hypertension - mother      Social History:   The patient works at the [employer] [place].     Physical Exam:     GENERAL: Healthy, alert and no distress\",\"EYES: Eyes grossly normal to inspection.  No discharge or erythema, or obvious scleral/conjunctival abnormalities.\",\"RESP: No audible wheeze, cough, or visible cyanosis.  No visible retractions or increased work of breathing.  \",\"SKIN: Visible skin clear. No significant rash, abnormal pigmentation or lesions.\",\"NEURO: Cranial nerves grossly intact.  Mentation and speech appropriate for age.\",\"PSYCH: Mentation appears normal, affect normal/bright, judgement and insight intact, normal speech and appearance well-groomed.       Data:  Lab on 10/08/2021   Component Date Value " Ref Range Status     Hemoglobin A1C 10/08/2021 5.9* 0.0 - 5.6 % Final    Normal <5.7%   Prediabetes 5.7-6.4%    Diabetes 6.5% or higher     Note: Adopted from ADA consensus guidelines.           Assessment and Plan:  #1 Type I diabetes mellitus   His last hemoglobin A1c today 10/8/21 is 5.9%    He is very interested in switching to the T slim pump and is working on it.  In the meantime, we will change his pump settings slightly to avoid hypoglycemia after eating breakfast.    Changes today:    MN to 3:30 AM: 0.65 units/h  3:30 AM to 10 AM: 0.65 units/h  10 AM to 6 PM: 0.65 unit(s)/h  6PM to 11PM: 0.75 unit(s)/h  11PM to MN: 0.65 unit(s)/h     Insulin sensitivity: no changes     Carb ratio:  midnight - 6 am: 1 unit for 20 g carb  6 am - 11 am: 1 unit for 14 g carb  11 am - 9 pm: 1 unit for 10 g carb  9pm - midnight: 1 unit for 20 g carb    Active insulin time : 3:30 hr:m---> 4 hours      Insulin sensitivity:  MN: 60  6 AM:  50  11:30 PM: 60     Follow-up: 3 months as a face-to-face visit with labs (ordered below) at that time    Orders Placed This Encounter   Procedures     Hemoglobin A1c     Albumin Random Urine Quantitative with Creat Ratio     TSH     Lipid Profile     Tissue transglutaminase rylee IgA and IgG     Basic metabolic panel     Adult Eye Referral       The case was discussed with my attending, Dr. Moe Salvador MD  PGY-5 Endocrine Fellow  Pager # [phone]    I have seen and examined the patient by video visit and agree with the fellow's plan of care as noted.  October 8, 2021    Dr. Bushra Rosa [phone]    Video start time: 09:54 am  Video end time: 10:35 am  Documentation time and coordination of care: 10 minutes - total time 30 minutes       Outcome for 10/08/21 9:09 AM :Glucose sent via Email    Yung is a 41 year old who is being evaluated via a billable video visit.      How would you like to obtain your AVS? MyChart  If the video visit is dropped, the invitation should be resent by: Send to  e-mail at: [email or web address]  Will anyone else be joining your video visit? No   Gina Moore EMT

## 2021-10-25 ENCOUNTER — OFFICE VISIT (OUTPATIENT)
Dept: FAMILY MEDICINE | Facility: CLINIC | Age: 41
End: 2021-10-25
Payer: COMMERCIAL

## 2021-10-25 VITALS
OXYGEN SATURATION: 100 % | TEMPERATURE: 98.3 F | WEIGHT: 157.8 LBS | HEART RATE: 57 BPM | BODY MASS INDEX: 22.64 KG/M2 | DIASTOLIC BLOOD PRESSURE: 89 MMHG | RESPIRATION RATE: 16 BRPM | SYSTOLIC BLOOD PRESSURE: 142 MMHG

## 2021-10-25 DIAGNOSIS — Z23 NEED FOR VACCINATION: ICD-10-CM

## 2021-10-25 DIAGNOSIS — L57.0 AK (ACTINIC KERATOSIS): Primary | ICD-10-CM

## 2021-10-25 DIAGNOSIS — Z23 NEED FOR PROPHYLACTIC VACCINATION AND INOCULATION AGAINST INFLUENZA: ICD-10-CM

## 2021-10-25 PROCEDURE — 99213 OFFICE O/P EST LOW 20 MIN: CPT | Mod: 25 | Performed by: NURSE PRACTITIONER

## 2021-10-25 PROCEDURE — 90686 IIV4 VACC NO PRSV 0.5 ML IM: CPT | Performed by: NURSE PRACTITIONER

## 2021-10-25 PROCEDURE — 90715 TDAP VACCINE 7 YRS/> IM: CPT | Performed by: NURSE PRACTITIONER

## 2021-10-25 PROCEDURE — 90471 IMMUNIZATION ADMIN: CPT | Performed by: NURSE PRACTITIONER

## 2021-10-25 PROCEDURE — 90472 IMMUNIZATION ADMIN EACH ADD: CPT | Performed by: NURSE PRACTITIONER

## 2021-10-25 NOTE — NURSING NOTE

## 2021-10-25 NOTE — PROGRESS NOTES
Assessment & Plan     AK (actinic keratosis)  Discussed benign.  Continue to monitor  F/u if growing or changing.      Need for prophylactic vaccination and inoculation against influenza      Need for vaccination    No follow-ups on file.    CAROL Marquez CNP  [facility]    Selin Barragan is a 41 year old who presents for the following health issues     HPI   Derm      Duration: 2 months     Description (location/character/radiation):  Mid back     Intensity:  No pain     Accompanying signs and symptoms: pt state his wife think its changing     History (similar episodes/previous evaluation): None    Precipitating or alleviating factors: None    Therapies tried and outcome: None   Not itching or draining  Never red or hot  Feels rough on the surface.    Pale brown/greyish    Review of Systems   Constitutional, HEENT, cardiovascular, pulmonary, gi and gu systems are negative, except as otherwise noted.      Objective    BP (!) 142/89 (BP Location: Left arm, Patient Position: Chair, Cuff Size: Adult Regular)   Pulse 57   Temp 98.3  F (36.8  C) (Tympanic)   Resp 16   Wt 71.6 kg (157 lb 12.8 oz)   SpO2 100%   BMI 22.64 kg/m    Body mass index is 22.64 kg/m .  Physical Exam   GENERAL: healthy, alert and no distress  NECK: no adenopathy, no asymmetry, masses, or scars and thyroid normal to palpation  RESP: lungs clear to auscultation - no rales, rhonchi or wheezes  CV: regular rate and rhythm, normal S1 S2, no S3 or S4, no murmur, click or rub, no peripheral edema and peripheral pulses strong  ABDOMEN: soft, nontender, no hepatosplenomegaly, no masses and bowel sounds normal  MS: no gross musculoskeletal defects noted, no edema  SKIN: no suspicious lesions or rashes and keratoses - actinic

## 2021-11-04 ENCOUNTER — MEDICAL CORRESPONDENCE (OUTPATIENT)
Dept: HEALTH INFORMATION MANAGEMENT | Facility: CLINIC | Age: 41
End: 2021-11-04

## 2021-11-06 ENCOUNTER — TELEPHONE (OUTPATIENT)
Dept: ENDOCRINOLOGY | Facility: CLINIC | Age: 41
End: 2021-11-06

## 2021-11-06 NOTE — TELEPHONE ENCOUNTER
Attempted to reach patient to schedule follow up in the Endocrinology Clinic.  ADOLFO and Lamine message sent.     Schedule with Dr. Bushra Rosa  in about 3 months (around 1/8/2022). Saira Soto on 11/6/2021 at 10:25 AM

## 2022-01-17 DIAGNOSIS — E10.9 DIABETES MELLITUS TYPE 1, CONTROLLED (H): ICD-10-CM

## 2022-01-17 RX ORDER — PROCHLORPERAZINE 25 MG/1
SUPPOSITORY RECTAL
Qty: 9 EACH | Refills: 3 | Status: SHIPPED | OUTPATIENT
Start: 2022-01-17 | End: 2022-07-15

## 2022-03-04 DIAGNOSIS — E10.9 DIABETES MELLITUS TYPE 1, CONTROLLED (H): ICD-10-CM

## 2022-03-04 RX ORDER — PROCHLORPERAZINE 25 MG/1
SUPPOSITORY RECTAL
Qty: 1 EACH | Refills: 3 | Status: SHIPPED | OUTPATIENT
Start: 2022-03-04 | End: 2022-07-15

## 2022-04-04 ENCOUNTER — MYC MEDICAL ADVICE (OUTPATIENT)
Dept: ENDOCRINOLOGY | Facility: CLINIC | Age: 42
End: 2022-04-04
Payer: COMMERCIAL

## 2022-04-04 DIAGNOSIS — E10.9 CONTROLLED DIABETES MELLITUS TYPE 1 WITHOUT COMPLICATIONS (H): ICD-10-CM

## 2022-04-05 RX ORDER — INSULIN LISPRO 100 [IU]/ML
INJECTION, SOLUTION INTRAVENOUS; SUBCUTANEOUS
Qty: 15 ML | Refills: 3 | OUTPATIENT
Start: 2022-04-05 | End: 2022-04-06

## 2022-04-05 NOTE — TELEPHONE ENCOUNTER
HUMALOG KWIKPEN 100 UNIT/ML soln  Last Written Prescription Date:  3/26/2021  Last Fill Quantity: 15,   # refills: 0  Last Office Visit :  10/8/2021  Future Office visit:  None  Routing refill request to provider for review/approval because:  Drug not on the [facility], [facility] or [facility] refill protocol or controlled substance    insulin aspart (NOVOLOG VIAL) 100 UNITS/ML vial  Last Written Prescription Date:  3/26/2021  Last Fill Quantity:  50   # refills: 3  Last Office Visit :  10/8/2021  Future Office visit:  None  Routing refill request to provider for review/approval because:  Drug not on the [facility], [facility] or [facility] refill protocol or controlled substance    insulin glargine (LANTUS PEN) 100 UNIT/ML pen  Last Written Prescription Date:  10/8/2021  Last Fill Quantity: 15,   # refills: 0  Last Office Visit :  10/8/2021  Future Office visit:  None  Routing refill request to provider for review/approval because:  Drug not on the [facility], [facility] or [facility] refill protocol or controlled substance    Gabriela Peters RN  Central Triage Red Flags/Med Refills

## 2022-04-06 DIAGNOSIS — E10.9 CONTROLLED DIABETES MELLITUS TYPE 1 WITHOUT COMPLICATIONS (H): ICD-10-CM

## 2022-04-11 DIAGNOSIS — E10.9 CONTROLLED DIABETES MELLITUS TYPE 1 WITHOUT COMPLICATIONS (H): Primary | ICD-10-CM

## 2022-04-11 RX ORDER — INSULIN ASPART 100 [IU]/ML
INJECTION, SOLUTION INTRAVENOUS; SUBCUTANEOUS
Qty: 30 ML | Refills: 3 | Status: SHIPPED | OUTPATIENT
Start: 2022-04-11 | End: 2022-07-15

## 2022-05-14 ENCOUNTER — HEALTH MAINTENANCE LETTER (OUTPATIENT)
Age: 42
End: 2022-05-14

## 2022-07-05 ENCOUNTER — TELEPHONE (OUTPATIENT)
Dept: ENDOCRINOLOGY | Facility: CLINIC | Age: 42
End: 2022-07-05

## 2022-07-07 DIAGNOSIS — E10.9 CONTROLLED DIABETES MELLITUS TYPE 1 WITHOUT COMPLICATIONS (H): ICD-10-CM

## 2022-07-07 DIAGNOSIS — E10.9 CONTROLLED DIABETES MELLITUS TYPE 1 WITHOUT COMPLICATIONS (H): Primary | ICD-10-CM

## 2022-07-07 RX ORDER — INSULIN ASPART 100 [IU]/ML
INJECTION, SOLUTION INTRAVENOUS; SUBCUTANEOUS
Qty: 50 ML | Refills: 3 | Status: SHIPPED | OUTPATIENT
Start: 2022-07-07 | End: 2022-07-15

## 2022-07-15 ENCOUNTER — OFFICE VISIT (OUTPATIENT)
Dept: ENDOCRINOLOGY | Facility: CLINIC | Age: 42
End: 2022-07-15
Payer: COMMERCIAL

## 2022-07-15 ENCOUNTER — LAB (OUTPATIENT)
Dept: LAB | Facility: CLINIC | Age: 42
End: 2022-07-15

## 2022-07-15 VITALS
WEIGHT: 153.4 LBS | SYSTOLIC BLOOD PRESSURE: 131 MMHG | HEIGHT: 70 IN | DIASTOLIC BLOOD PRESSURE: 74 MMHG | HEART RATE: 60 BPM | OXYGEN SATURATION: 100 % | BODY MASS INDEX: 21.96 KG/M2

## 2022-07-15 DIAGNOSIS — E10.9 CONTROLLED DIABETES MELLITUS TYPE 1 WITHOUT COMPLICATIONS (H): Primary | ICD-10-CM

## 2022-07-15 DIAGNOSIS — E10.9 CONTROLLED DIABETES MELLITUS TYPE 1 WITHOUT COMPLICATIONS (H): ICD-10-CM

## 2022-07-15 LAB
ANION GAP SERPL CALCULATED.3IONS-SCNC: 9 MMOL/L (ref 3–14)
BUN SERPL-MCNC: 8 MG/DL (ref 7–30)
CALCIUM SERPL-MCNC: 9.3 MG/DL (ref 8.5–10.1)
CHLORIDE BLD-SCNC: 106 MMOL/L (ref 94–109)
CHOLEST SERPL-MCNC: 126 MG/DL
CO2 SERPL-SCNC: 26 MMOL/L (ref 20–32)
CREAT SERPL-MCNC: 0.66 MG/DL (ref 0.66–1.25)
CREAT UR-MCNC: 11 MG/DL
FASTING STATUS PATIENT QL REPORTED: NORMAL
GFR SERPL CREATININE-BSD FRML MDRD: >90 ML/MIN/1.73M2
GLUCOSE BLD-MCNC: 47 MG/DL (ref 70–99)
HBA1C MFR BLD: 5.6 % (ref 4.3–?)
HDLC SERPL-MCNC: 67 MG/DL
LDLC SERPL CALC-MCNC: 49 MG/DL
MICROALBUMIN UR-MCNC: <5 MG/L
MICROALBUMIN/CREAT UR: NORMAL MG/G{CREAT}
NONHDLC SERPL-MCNC: 59 MG/DL
POTASSIUM BLD-SCNC: 3.7 MMOL/L (ref 3.4–5.3)
SODIUM SERPL-SCNC: 141 MMOL/L (ref 133–144)
T4 FREE SERPL-MCNC: 0.95 NG/DL (ref 0.76–1.46)
TRIGL SERPL-MCNC: 52 MG/DL
TSH SERPL DL<=0.005 MIU/L-ACNC: 0.72 MU/L (ref 0.4–4)

## 2022-07-15 PROCEDURE — 80048 BASIC METABOLIC PNL TOTAL CA: CPT | Performed by: PATHOLOGY

## 2022-07-15 PROCEDURE — 86364 TISS TRNSGLTMNASE EA IG CLAS: CPT | Mod: 90 | Performed by: PATHOLOGY

## 2022-07-15 PROCEDURE — 99000 SPECIMEN HANDLING OFFICE-LAB: CPT | Performed by: PATHOLOGY

## 2022-07-15 PROCEDURE — 82306 VITAMIN D 25 HYDROXY: CPT | Mod: 90 | Performed by: PATHOLOGY

## 2022-07-15 PROCEDURE — 36415 COLL VENOUS BLD VENIPUNCTURE: CPT | Performed by: PATHOLOGY

## 2022-07-15 PROCEDURE — 82043 UR ALBUMIN QUANTITATIVE: CPT | Performed by: PATHOLOGY

## 2022-07-15 PROCEDURE — 84439 ASSAY OF FREE THYROXINE: CPT | Performed by: PATHOLOGY

## 2022-07-15 PROCEDURE — 83036 HEMOGLOBIN GLYCOSYLATED A1C: CPT | Performed by: INTERNAL MEDICINE

## 2022-07-15 PROCEDURE — 84443 ASSAY THYROID STIM HORMONE: CPT | Performed by: PATHOLOGY

## 2022-07-15 PROCEDURE — 80061 LIPID PANEL: CPT | Performed by: PATHOLOGY

## 2022-07-15 PROCEDURE — 99214 OFFICE O/P EST MOD 30 MIN: CPT | Performed by: INTERNAL MEDICINE

## 2022-07-15 RX ORDER — PROCHLORPERAZINE 25 MG/1
SUPPOSITORY RECTAL
Qty: 1 EACH | Refills: 3 | Status: SHIPPED | OUTPATIENT
Start: 2022-07-15 | End: 2023-01-27

## 2022-07-15 RX ORDER — INSULIN ASPART 100 [IU]/ML
INJECTION, SOLUTION INTRAVENOUS; SUBCUTANEOUS
Qty: 50 ML | Refills: 3 | Status: SHIPPED | OUTPATIENT
Start: 2022-07-15 | End: 2023-01-27

## 2022-07-15 RX ORDER — PROCHLORPERAZINE 25 MG/1
SUPPOSITORY RECTAL
Qty: 9 EACH | Refills: 3 | Status: SHIPPED | OUTPATIENT
Start: 2022-07-15 | End: 2023-01-27

## 2022-07-15 RX ORDER — LISINOPRIL 5 MG/1
15 TABLET ORAL DAILY
Qty: 270 TABLET | Refills: 4 | Status: SHIPPED | OUTPATIENT
Start: 2022-07-15 | End: 2022-11-08

## 2022-07-15 RX ORDER — INSULIN GLARGINE 100 [IU]/ML
15 INJECTION, SOLUTION SUBCUTANEOUS DAILY
Qty: 15 ML | Refills: 0 | Status: SHIPPED | OUTPATIENT
Start: 2022-07-15 | End: 2023-01-27

## 2022-07-15 ASSESSMENT — PAIN SCALES - GENERAL: PAINLEVEL: NO PAIN (0)

## 2022-07-15 NOTE — LETTER
Patient:  Joseph O Thoen  :   1980  MRN:     [id number]        Mr.Joseph O Thoen  [address] [facility] [place] [address]        2022    Dear Yung    Here are your labs.  Other than that slightly lower sugar level, which I had notified you about, the rest of your labs look great.  This includes your vitamin D, thyroid, and kidney levels.  Keep up the great work.    If you have any questions, please feel free to contact my nurse at [phone] select option #3 for triage nurse  or  option #1 for scheduling related questions.    Regards    Bushra Rosa MD     Resulted Orders   25 Hydroxyvitamin D2 and D3   Result Value Ref Range    25 OH Vitamin D2 <5 ug/L    25 OH Vitamin D3 37 ug/L    25 OH Vit D Total <42 20 - 75 ug/L      Comment:      Season, race, dietary intake, and treatment affect the concentration of 25-hydroxy-Vitamin D. Values may decrease during winter months and increase during summer months. Values 20-29 ug/L may indicate Vitamin D insufficiency and values <20 ug/L may indicate Vitamin D deficiency.    Narrative    This test was developed and its performance characteristics determined by the [facility],  Special [facility] Laboratory. It has not been cleared or approved by the FDA. The laboratory is regulated under CLIA as qualified to perform high-complexity testing. This test is used for clinical purposes. It should not be regarded as investigational or for research.   Lipid Profile   Result Value Ref Range    Cholesterol 126 <200 mg/dL    Triglycerides 52 <150 mg/dL    Direct Measure HDL 67 >=40 mg/dL    LDL Cholesterol Calculated 49 <=100 mg/dL    Non HDL Cholesterol 59 <130 mg/dL    Patient Fasting > 8hrs? Unknown     Narrative    Cholesterol  Desirable:  <200 mg/dL    Triglycerides  Normal:  Less than 150 mg/dL  Borderline High:  150-199 mg/dL  High:  200-499 mg/dL  Very High:  Greater than or equal to 500 mg/dL    Direct Measure HDL  Female:  Greater than  or equal to 50 mg/dL   Male:  Greater than or equal to 40 mg/dL    LDL Cholesterol  Desirable:  <100mg/dL  Above Desirable:  100-129 mg/dL   Borderline High:  130-159 mg/dL   High:  160-189 mg/dL   Very High:  >= 190 mg/dL    Non HDL Cholesterol  Desirable:  130 mg/dL  Above Desirable:  130-159 mg/dL  Borderline High:  160-189 mg/dL  High:  190-219 mg/dL  Very High:  Greater than or equal to 220 mg/dL   Albumin Random Urine Quantitative with Creat Ratio   Result Value Ref Range    Creatinine Urine mg/dL 11 mg/dL    Albumin Urine mg/L <5 mg/L    Albumin Urine mg/g Cr        Comment:      Unable to calculate, urine albumin or creatinine is outside the detectable limits.   Basic metabolic panel   Result Value Ref Range    Sodium 141 133 - 144 mmol/L    Potassium 3.7 3.4 - 5.3 mmol/L    Chloride 106 94 - 109 mmol/L    Carbon Dioxide (CO2) 26 20 - 32 mmol/L    Anion Gap 9 3 - 14 mmol/L    Urea Nitrogen 8 7 - 30 mg/dL    Creatinine 0.66 0.66 - 1.25 mg/dL    Calcium 9.3 8.5 - 10.1 mg/dL    Glucose 47 (LL) 70 - 99 mg/dL    GFR Estimate >90 >60 mL/min/1.73m2      Comment:      Effective December 21, 2021 eGFRcr in adults is calculated using the 2021 CKD-EPI creatinine equation which includes age and gender (Je begum al., NEJ, DOI: 10.1056/ZARKle0197161)   TSH   Result Value Ref Range    TSH 0.72 0.40 - 4.00 mU/L   T4 free   Result Value Ref Range    Free T4 0.95 0.76 - 1.46 ng/dL   Tissue transglutaminase rylee IgA and IgG   Result Value Ref Range    Tissue Transglutaminase Antibody IgA 0.5 <7.0 U/mL      Comment:      Negative- The tTG-IgA assay has limited utility for patients with decreased levels of IgA. Screening for celiac disease should include IgA testing to rule out selective IgA deficiency and to guide selection and interpretation of serological testing. tTG-IgG testing may be positive in celiac disease patients with IgA deficiency.    Tissue Transglutaminase Antibody IgG <0.6 <7.0 U/mL      Comment:      Negative        Lab

## 2022-07-15 NOTE — NURSING NOTE
"Chief Complaint   Patient presents with     Diabetes     Vital signs:      BP: 131/74 Pulse: 60     SpO2: 100 %     Height: 177.8 cm (5' 10\") Weight: 69.6 kg (153 lb 6.4 oz)  Estimated body mass index is 22.01 kg/m  as calculated from the following:    Height as of this encounter: 1.778 m (5' 10\").    Weight as of this encounter: 69.6 kg (153 lb 6.4 oz).          "

## 2022-07-15 NOTE — PROGRESS NOTES
Chief Complaint:   Diabetes     History of Present Illness:   Joseph O Thoen is a 42 year old male with a history of type I diabetes mellitus who presents alone for follow-up of diabetes.    #1 Type I diabetes mellitus   The patient was initially seen in May 2011 at which time he had a 30 pound weight loss with polyuria, polydipsia, serum glucose of 466, and Hemoglobin A1c of 13.5%.  His C-peptide was 0.8 with a glucose of 318.  DEMARCO antibodies were negative.  He was initially started on Metformin however his blood sugars did not improve therefore he was switched to Lantus and Novolog.  Blood work in September 2011 was significant for negative anti-islet and anti-insulin antibodies.  Since starting insulin, his diabetes has been well controlled with Hemoglobin A1c values all <7%.  He started on a Medtronic insulin pump in July 2012 and switched to the closed-loop system in January 2018.  The patient did not like the Medtronic closed-loop system due to the alarms.  so he is on the Medtronic pump with the Dexcom G6 sensor (started in January 2021).  He reports that he is extremely happy with the Dexcom G6 sensor and feels that this has been useful.    October 2021 hemoglobin A1c of 5.9.    Interval history: The patient has been on the tandem t:slim pump with control IQ since roughly November 2021.  He is very satisfied with the program.  July 2022 hemoglobin A1c of 5.6.  Notably, he denies any significant problems with hyperglycemia or hypoglycemia and has noted an improvement in his a.m. hyperglycemia since on this program.  Reviewing his CGM, he does continue to have some hypoglycemia with meals.  Continues on lisinopril at 50 mg daily.    Blood Glucose Monitoring:  CGM review: Average glucose 136, uses about 43 units/day,    Current Insulin Pump Settings:    T:slim pump with control IQ:  Basal rate:  Midnight to 10 AM: 0.65 units/h  10 AM to 6 PM: 0.7 units/h  6 PM to 11 PM: 0.65 units/h  11 PM to midnight: 0.575  units/h    Insulin sensitivity:  11 PM to 6 AM: 1 per 60  6 AM to 11 PM: 1 per 50    Carb coverage:  9 PM to 10 AM: 1 per 12 g carbohydrate  10 AM to 9 PM: 1 per 10 g carbohydrate    Diabetes monitoring and complications:  CAD: No  Last eye exam results: 2/13/19, no diabetic retinopathy  Microalbuminuria: negative 1/11/19  Neuropathy: No  HTN: Yes, on Lisinopril   On Statin: No  On Aspirin: No  Depression: No  Erectile dysfunction: No     #2 Low vitamin D  There is a history of low vitamin D.  Patient needs to have his vitamin D rechecked.      Review of Systems:   Pertinent items are noted in HPI.  All other systems are negative.    Active Medications:   Current Outpatient Medications   Medication Sig Dispense Refill     acetone, Urine, test STRP 1 each by In Vitro route as needed. 50 strip 3     blood glucose (TOSHA CONTOUR) test strip strips work with meter / pump. Test 6 times daily  Tosha contour  NEXT strips 600 strip 3     Blood Pressure Monitor KIT Take bp 2 times a week or PRN problems.  Dx DM 1 kit 0     Continuous Blood Gluc Sensor (DEXCOM G6 SENSOR) MISC Change every 10 days. 9 each 3     Continuous Blood Gluc Transmit (DEXCOM G6 TRANSMITTER) MISC Change every 3 months. 1 each 3     Glucagon (BAQSIMI TWO PACK) 3 MG/DOSE POWD Spray 1 Application in nostril as needed (as needed) Cost checking only 1 each 1     insulin aspart (NOVOLOG VIAL) 100 UNITS/ML vial Use in pump approx 50 units daily 50 mL 3     insulin aspart (NOVOLOG VIAL) 100 UNITS/ML vial Pt uses about 50 units per day in the pump 50 mL 1     insulin glargine (LANTUS PEN) 100 UNIT/ML pen Pt to be on lantus 15 units daily if pump fails 15 mL 0     lisinopril (ZESTRIL) 5 MG tablet Take 3 tablets (15 mg) by mouth daily Take 3 tablet (15 mg) daily 270 tablet 4     MICROLET LANCETS MISC 1 each 6 times daily 200 each 11     NOVOLOG FLEXPEN 100 UNIT/ML soln Carb counting approx 50 units daily 30 mL 3     valACYclovir (VALTREX) 1000 mg tablet Take 1  "tablet (1,000 mg) by mouth 3 times daily for 7 days 21 tablet 0     Allergies:   Benadryl [altaryl]      Past Medical History:  Type I diabetes mellitus   Hypertension      Past Surgical History:  History reviewed. No pertinent past surgical history.      Family History:   Thyroid disease - mother  Hypertension - mother      Social History:   The patient works at the [employer].     Physical Exam:   Blood pressure 131/74, pulse 60, height 1.778 m (5' 10\"), weight 69.6 kg (153 lb 6.4 oz), SpO2 100 %.    GENERAL APPEARANCE: Alert and no distress  NECK: No lymphadenopathy appreciated  Thyroid: No obvious nodules palpated   CV: RRR without M/R/G  Lungs: CTA bilaterally  Abdomen: Soft, Nontender, non distended, positive bowel sounds   Neuro, no focal deficits  Skin: No infection in feet   Mood: Normal   Lymph: neg in neck and supraclavicular area     Data:  Office Visit on 07/15/2022   Component Date Value Ref Range Status     Hemoglobin A1C POCT 07/15/2022 5.6  4.3 - <5.7 % Final     Assessment and Plan:  #1 Type I diabetes mellitus   Patient is now on a t:slim pump with control IQ.  He is very satisfied with this program.  July 2022 hemoglobin A1c 5.6.  He does have some hypoglycemia with meals-we will increase carb coverage as noted below.  Patient given prescription for Lantus in case of pump failure.  Labs per below.  Patient to schedule eye exam.  Patient to remain on lisinopril.  Congratulated patient.    Current Insulin Pump Settings:    T:slim pump with control IQ:  Basal rate:  Midnight to 10 AM: 0.65 units/h  10 AM to 6 PM: 0.7 units/h  6 PM to 11 PM: 0.65 units/h  11 PM to midnight: 0.575 units/h    Insulin sensitivity:  11 PM to 6 AM: 1 per 60  6 AM to 11 PM: 1 per 50    Carb coverage:  9:30 PM to 6 AM: 1 per 12 g carbohydrate  6 AM to 9:30 PM: 1 per 9 g carbohydrate    #2 hypertension  The patient has hypertension in the setting of type 1 diabetes.  On lisinopril milligrams daily.  We will recheck " BMP and appointment.  Refills given.    Plan for return visit in 6 months (virtual) - pt to do eval as noted below within the next month    Orders Placed This Encounter   Procedures     25 Hydroxyvitamin D2 and D3     Lipid Profile     Albumin Random Urine Quantitative with Creat Ratio     Basic metabolic panel     TSH     T4 free     Tissue transglutaminase rylee IgA and IgG     Adult Eye Referral     Hemoglobin A1c POCT     30 minutes spent with pt

## 2022-07-15 NOTE — LETTER
7/15/2022       RE: Joseph O Thoen  [address] [facility] [place] [address]     Dear Colleague,    Thank you for referring your patient, Joseph O Thoen, to the [facility] [place] at [facility]. Please see a copy of my visit note below.    Outcome for 07/13/22 9:08 AM: Gengo message sent  ALONDRA Jones          Chief Complaint:   Diabetes     History of Present Illness:   Joseph O Thoen is a 42 year old male with a history of type I diabetes mellitus who presents alone for follow-up of diabetes.    #1 Type I diabetes mellitus   The patient was initially seen in May 2011 at which time he had a 30 pound weight loss with polyuria, polydipsia, serum glucose of 466, and Hemoglobin A1c of 13.5%.  His C-peptide was 0.8 with a glucose of 318.  DEMARCO antibodies were negative.  He was initially started on Metformin however his blood sugars did not improve therefore he was switched to Lantus and Novolog.  Blood work in September 2011 was significant for negative anti-islet and anti-insulin antibodies.  Since starting insulin, his diabetes has been well controlled with Hemoglobin A1c values all <7%.  He started on a Medtronic insulin pump in July 2012 and switched to the closed-loop system in January 2018.  The patient did not like the Medtronic closed-loop system due to the alarms.  so he is on the Medtronic pump with the Dexcom G6 sensor (started in January 2021).  He reports that he is extremely happy with the Dexcom G6 sensor and feels that this has been useful.    October 2021 hemoglobin A1c of 5.9.    Interval history: The patient has been on the tandem t:slim pump with control IQ since roughly November 2021.  He is very satisfied with the program.  July 2022 hemoglobin A1c of 5.6.  Notably, he denies any significant problems with hyperglycemia or hypoglycemia and has noted an improvement in his a.m. hyperglycemia since on this program.   Reviewing his CGM, he does continue to have some hypoglycemia with meals.  Continues on lisinopril at 50 mg daily.    Blood Glucose Monitoring:  CGM review: Average glucose 136, uses about 43 units/day,    Current Insulin Pump Settings:    T:slim pump with control IQ:  Basal rate:  Midnight to 10 AM: 0.65 units/h  10 AM to 6 PM: 0.7 units/h  6 PM to 11 PM: 0.65 units/h  11 PM to midnight: 0.575 units/h    Insulin sensitivity:  11 PM to 6 AM: 1 per 60  6 AM to 11 PM: 1 per 50    Carb coverage:  9 PM to 10 AM: 1 per 12 g carbohydrate  10 AM to 9 PM: 1 per 10 g carbohydrate    Diabetes monitoring and complications:  CAD: No  Last eye exam results: 2/13/19, no diabetic retinopathy  Microalbuminuria: negative 1/11/19  Neuropathy: No  HTN: Yes, on Lisinopril   On Statin: No  On Aspirin: No  Depression: No  Erectile dysfunction: No     #2 Low vitamin D  There is a history of low vitamin D.  Patient needs to have his vitamin D rechecked.      Review of Systems:   Pertinent items are noted in HPI.  All other systems are negative.    Active Medications:   Current Outpatient Medications   Medication Sig Dispense Refill     acetone, Urine, test STRP 1 each by In Vitro route as needed. 50 strip 3     blood glucose (TOSHA CONTOUR) test strip strips work with meter / pump. Test 6 times daily  Tosha contour  NEXT strips 600 strip 3     Blood Pressure Monitor KIT Take bp 2 times a week or PRN problems.  Dx DM 1 kit 0     Continuous Blood Gluc Sensor (DEXCOM G6 SENSOR) MISC Change every 10 days. 9 each 3     Continuous Blood Gluc Transmit (DEXCOM G6 TRANSMITTER) MISC Change every 3 months. 1 each 3     Glucagon (BAQSIMI TWO PACK) 3 MG/DOSE POWD Spray 1 Application in nostril as needed (as needed) Cost checking only 1 each 1     insulin aspart (NOVOLOG VIAL) 100 UNITS/ML vial Use in pump approx 50 units daily 50 mL 3     insulin aspart (NOVOLOG VIAL) 100 UNITS/ML vial Pt uses about 50 units per day in the pump 50 mL 1     insulin  "glargine (LANTUS PEN) 100 UNIT/ML pen Pt to be on lantus 15 units daily if pump fails 15 mL 0     lisinopril (ZESTRIL) 5 MG tablet Take 3 tablets (15 mg) by mouth daily Take 3 tablet (15 mg) daily 270 tablet 4     MICROLET LANCETS MISC 1 each 6 times daily 200 each 11     NOVOLOG FLEXPEN 100 UNIT/ML soln Carb counting approx 50 units daily 30 mL 3     valACYclovir (VALTREX) 1000 mg tablet Take 1 tablet (1,000 mg) by mouth 3 times daily for 7 days 21 tablet 0     Allergies:   Benadryl [altaryl]      Past Medical History:  Type I diabetes mellitus   Hypertension      Past Surgical History:  History reviewed. No pertinent past surgical history.      Family History:   Thyroid disease - mother  Hypertension - mother      Social History:   The patient works at the [employer].     Physical Exam:   Blood pressure 131/74, pulse 60, height 1.778 m (5' 10\"), weight 69.6 kg (153 lb 6.4 oz), SpO2 100 %.    GENERAL APPEARANCE: Alert and no distress  NECK: No lymphadenopathy appreciated  Thyroid: No obvious nodules palpated   CV: RRR without M/R/G  Lungs: CTA bilaterally  Abdomen: Soft, Nontender, non distended, positive bowel sounds   Neuro, no focal deficits  Skin: No infection in feet   Mood: Normal   Lymph: neg in neck and supraclavicular area     Data:  Office Visit on 07/15/2022   Component Date Value Ref Range Status     Hemoglobin A1C POCT 07/15/2022 5.6  4.3 - <5.7 % Final     Assessment and Plan:  #1 Type I diabetes mellitus   Patient is now on a t:slim pump with control IQ.  He is very satisfied with this program.  July 2022 hemoglobin A1c 5.6.  He does have some hypoglycemia with meals-we will increase carb coverage as noted below.  Patient given prescription for Lantus in case of pump failure.  Labs per below.  Patient to schedule eye exam.  Patient to remain on lisinopril.  Congratulated patient.    Current Insulin Pump Settings:    T:slim pump with control IQ:  Basal rate:  Midnight to 10 AM: 0.65 " units/h  10 AM to 6 PM: 0.7 units/h  6 PM to 11 PM: 0.65 units/h  11 PM to midnight: 0.575 units/h    Insulin sensitivity:  11 PM to 6 AM: 1 per 60  6 AM to 11 PM: 1 per 50    Carb coverage:  9:30 PM to 6 AM: 1 per 12 g carbohydrate  6 AM to 9:30 PM: 1 per 9 g carbohydrate    #2 hypertension  The patient has hypertension in the setting of type 1 diabetes.  On lisinopril milligrams daily.  We will recheck BMP and appointment.  Refills given.    Plan for return visit in 6 months (virtual) - pt to do eval as noted below within the next month    Orders Placed This Encounter   Procedures     25 Hydroxyvitamin D2 and D3     Lipid Profile     Albumin Random Urine Quantitative with Creat Ratio     Basic metabolic panel     TSH     T4 free     Tissue transglutaminase rylee IgA and IgG     Adult Eye Referral     Hemoglobin A1c POCT     30 minutes spent with pt       Again, thank you for allowing me to participate in the care of your patient.      Sincerely,    Bushra Rosa MD

## 2022-07-15 NOTE — TELEPHONE ENCOUNTER
Order resent.   Christiane Collins RN on 7/15/2022 at 3:57 PM     Aram Stein Deanne M, RN  Hello. It printed locally and was already deleted unfortunately. I'm sorry! Would you be able to send another script over?

## 2022-07-15 NOTE — LETTER
Date:July 17, 2022      Patient was self referred, no letter generated. Do not send.        [facility] Health Information

## 2022-07-18 ENCOUNTER — TELEPHONE (OUTPATIENT)
Dept: ENDOCRINOLOGY | Facility: CLINIC | Age: 42
End: 2022-07-18

## 2022-07-18 LAB
TTG IGA SER-ACNC: 0.5 U/ML
TTG IGG SER-ACNC: <0.6 U/ML

## 2022-07-18 NOTE — RESULT ENCOUNTER NOTE
Dear Yung    Here are your labs which show a lower glucose (I had called you about this). Still waiting on the vitamin D. Will let you know once I get everything back.    If you have any questions, please feel free to contact my nurse at [phone] select option #3 for triage nurse  or  option #1 for scheduling related questions.    Regards    Bushra Rosa MD

## 2022-07-22 LAB
DEPRECATED CALCIDIOL+CALCIFEROL SERPL-MC: <42 UG/L (ref 20–75)
VITAMIN D2 SERPL-MCNC: <5 UG/L
VITAMIN D3 SERPL-MCNC: 37 UG/L

## 2022-07-25 ENCOUNTER — TELEPHONE (OUTPATIENT)
Dept: ENDOCRINOLOGY | Facility: CLINIC | Age: 42
End: 2022-07-25

## 2022-07-25 NOTE — TELEPHONE ENCOUNTER
Called pt - left message.     -  Dear Yung    Here are your labs.  Other than that slightly lower sugar level, which I had notified you about, the rest of your labs look great.  This includes your vitamin D, thyroid, and kidney levels.  Keep up the great work.    If you have any questions, please feel free to contact my nurse at [phone] select option #3 for triage nurse  or  option #1 for scheduling related questions.    Regards    Bushra Rosa MD     No visits with results within 1 Week(s) from this visit.   Latest known visit with results is:   Lab on 07/15/2022   Component Date Value Ref Range Status     25 OH Vitamin D2 07/15/2022 <5  ug/L Final     25 OH Vitamin D3 07/15/2022 37  ug/L Final     25 OH Vit D Total 07/15/2022 <42  20 - 75 ug/L Final    Season, race, dietary intake, and treatment affect the concentration of 25-hydroxy-Vitamin D. Values may decrease during winter months and increase during summer months. Values 20-29 ug/L may indicate Vitamin D insufficiency and values <20 ug/L may indicate Vitamin D deficiency.     Cholesterol 07/15/2022 126  <200 mg/dL Final     Triglycerides 07/15/2022 52  <150 mg/dL Final     Direct Measure HDL 07/15/2022 67  >=40 mg/dL Final     LDL Cholesterol Calculated 07/15/2022 49  <=100 mg/dL Final     Non HDL Cholesterol 07/15/2022 59  <130 mg/dL Final     Patient Fasting > 8hrs? 07/15/2022 Unknown   Final     Creatinine Urine mg/dL 07/15/2022 11  mg/dL Final     Albumin Urine mg/L 07/15/2022 <5  mg/L Final     Albumin Urine mg/g Cr 07/15/2022    Final    Unable to calculate, urine albumin or creatinine is outside the detectable limits.     Sodium 07/15/2022 141  133 - 144 mmol/L Final     Potassium 07/15/2022 3.7  3.4 - 5.3 mmol/L Final     Chloride 07/15/2022 106  94 - 109 mmol/L Final     Carbon Dioxide (CO2) 07/15/2022 26  20 - 32 mmol/L Final     Anion Gap 07/15/2022 9  3 - 14 mmol/L Final     Urea Nitrogen 07/15/2022 8  7 - 30 mg/dL Final     Creatinine 07/15/2022  0.66  0.66 - 1.25 mg/dL Final     Calcium 07/15/2022 9.3  8.5 - 10.1 mg/dL Final     Glucose 07/15/2022 47 (A) 70 - 99 mg/dL Final     GFR Estimate 07/15/2022 >90  >60 mL/min/1.73m2 Final    Effective December 21, 2021 eGFRcr in adults is calculated using the 2021 CKD-EPI creatinine equation which includes age and gender (Je et al., NE, DOI: 10.1056/OCONny3671394)     TSH 07/15/2022 0.72  0.40 - 4.00 mU/L Final     Free T4 07/15/2022 0.95  0.76 - 1.46 ng/dL Final     Tissue Transglutaminase Antibody I* 07/15/2022 0.5  <7.0 U/mL Final    Negative- The tTG-IgA assay has limited utility for patients with decreased levels of IgA. Screening for celiac disease should include IgA testing to rule out selective IgA deficiency and to guide selection and interpretation of serological testing. tTG-IgG testing may be positive in celiac disease patients with IgA deficiency.     Tissue Transglutaminase Antibody I* 07/15/2022 <0.6  <7.0 U/mL Final    Negative

## 2022-07-25 NOTE — RESULT ENCOUNTER NOTE
Dear Yung    Here are your labs.  Other than that slightly lower sugar level, which I had notified you about, the rest of your labs look great.  This includes your vitamin D, thyroid, and kidney levels.  Keep up the great work.    If you have any questions, please feel free to contact my nurse at [phone] select option #3 for triage nurse  or  option #1 for scheduling related questions.    Regards    Bushra Rosa MD

## 2022-08-26 ENCOUNTER — OFFICE VISIT (OUTPATIENT)
Dept: OPHTHALMOLOGY | Facility: CLINIC | Age: 42
End: 2022-08-26
Attending: INTERNAL MEDICINE
Payer: COMMERCIAL

## 2022-08-26 DIAGNOSIS — E10.9 CONTROLLED DIABETES MELLITUS TYPE 1 WITHOUT COMPLICATIONS (H): ICD-10-CM

## 2022-08-26 PROCEDURE — 92004 COMPRE OPH EXAM NEW PT 1/>: CPT | Performed by: OPTOMETRIST

## 2022-08-26 ASSESSMENT — SLIT LAMP EXAM - LIDS
COMMENTS: TR BLEPH
COMMENTS: TR BLEPH

## 2022-08-26 ASSESSMENT — REFRACTION_MANIFEST
OS_CYLINDER: SPHERE
OD_SPHERE: -0.50
OD_CYLINDER: SPHERE
OS_SPHERE: -0.75

## 2022-08-26 ASSESSMENT — CUP TO DISC RATIO
OS_RATIO: 0.25
OD_RATIO: 0.25

## 2022-08-26 ASSESSMENT — VISUAL ACUITY
CORRECTION_TYPE: GLASSES
OS_CC: 20/20-2
METHOD: SNELLEN - LINEAR
OD_CC: 20/20

## 2022-08-26 ASSESSMENT — REFRACTION_WEARINGRX
OD_CYLINDER: SPHERE
OS_CYLINDER: SPHERE
OS_SPHERE: -0.75
OD_SPHERE: -0.75

## 2022-08-26 ASSESSMENT — TONOMETRY
OD_IOP_MMHG: 18
IOP_METHOD: ICARE
OS_IOP_MMHG: 18

## 2022-08-26 ASSESSMENT — CONF VISUAL FIELD
OD_NORMAL: 1
OS_NORMAL: 1

## 2022-08-26 ASSESSMENT — EXTERNAL EXAM - LEFT EYE: OS_EXAM: NORMAL

## 2022-08-26 ASSESSMENT — EXTERNAL EXAM - RIGHT EYE: OD_EXAM: NORMAL

## 2022-08-26 NOTE — PROGRESS NOTES
A/P  1.) Type 1 Diabetes without ophthalmic manifestation   -Last A1c 5.6, excellent control. No h/o diabetic retinopathy  -Reviewed effects of DM on the eyes and importance of good blood sugar control  -Monitor annually with dilation    2.) Low Myopia OU  -Wearing DVO glasses prn. Otherwise doing well  -Continue    Monitor 1 year diabetic eye exam, sooner prn

## 2022-09-03 ENCOUNTER — HEALTH MAINTENANCE LETTER (OUTPATIENT)
Age: 42
End: 2022-09-03

## 2022-11-08 DIAGNOSIS — E10.9 CONTROLLED DIABETES MELLITUS TYPE 1 WITHOUT COMPLICATIONS (H): ICD-10-CM

## 2022-11-08 RX ORDER — LISINOPRIL 5 MG/1
15 TABLET ORAL DAILY
Qty: 270 TABLET | Refills: 4 | Status: SHIPPED | OUTPATIENT
Start: 2022-11-08 | End: 2023-01-27

## 2022-12-19 ENCOUNTER — MYC MEDICAL ADVICE (OUTPATIENT)
Dept: EDUCATION SERVICES | Facility: CLINIC | Age: 42
End: 2022-12-19

## 2022-12-19 DIAGNOSIS — E10.9 CONTROLLED DIABETES MELLITUS TYPE 1 WITHOUT COMPLICATIONS (H): Primary | ICD-10-CM

## 2022-12-22 RX ORDER — INSULIN INFUSION SET/CARTRIDGE
1 COMBINATION PACKAGE (EA) MISCELLANEOUS
Qty: 40 EACH | Refills: 1 | Status: SHIPPED | OUTPATIENT
Start: 2022-12-22 | End: 2023-01-27

## 2022-12-22 NOTE — TELEPHONE ENCOUNTER
Diabetes Educator Note:     Order for 3 month supply of Autosoft XC 9mm, 23 inch tubing infusion sets and T:Slim cartridges sent to [facility] per patient request.     Cindy Rendon, ABBYN, RN, [facility]  Certified Diabetes Care and   [facility] Endocrinology and Diabetes  [facility] [place]  Clinic [id number]  Phone [phone]

## 2023-01-20 NOTE — PROGRESS NOTES
Outcome for 01/20/23 9:34 AM: Mychart message sent  Taylor Myhre, VF  Outcome for 01/25/23 1:22 PM: Left Voicemail   ALONDRA Ohara  Outcome for 01/25/23 1:22 PM: Mychart message sent  ALONDRA Ohara   Outcome for 01/26/23 9:23 AM: Left Voicemail   Taylor Myhre, VF  Outcome for 01/26/23 3:10 PM: Tandem emailed to provider  ALONDRA Ohara

## 2023-01-25 ENCOUNTER — TELEPHONE (OUTPATIENT)
Dept: ENDOCRINOLOGY | Facility: CLINIC | Age: 43
End: 2023-01-25
Payer: COMMERCIAL

## 2023-01-25 NOTE — TELEPHONE ENCOUNTER
Called patient and left voicemail. Patient has an appointment on 1/27/23. Need patient to upload their Tandem device to site for provider to review prior to their appointment. Advised pt to send LUXA username and password via [employer] to link to clinic. Saira Soto on 1/25/2023 at 1:24 PM

## 2023-01-26 NOTE — TELEPHONE ENCOUNTER
Called patient and left voicemail. Patient has an appointment on 1/27/2023. Need patient to upload their Tandem device to site for provider to review prior to their appointment.

## 2023-01-27 ENCOUNTER — VIRTUAL VISIT (OUTPATIENT)
Dept: ENDOCRINOLOGY | Facility: CLINIC | Age: 43
End: 2023-01-27
Payer: COMMERCIAL

## 2023-01-27 DIAGNOSIS — E10.9 CONTROLLED DIABETES MELLITUS TYPE 1 WITHOUT COMPLICATIONS (H): ICD-10-CM

## 2023-01-27 PROCEDURE — 99213 OFFICE O/P EST LOW 20 MIN: CPT | Mod: 95 | Performed by: INTERNAL MEDICINE

## 2023-01-27 RX ORDER — INSULIN INFUSION SET/CARTRIDGE
1 COMBINATION PACKAGE (EA) MISCELLANEOUS
Qty: 40 EACH | Refills: 1 | Status: SHIPPED | OUTPATIENT
Start: 2023-01-27 | End: 2023-07-21

## 2023-01-27 RX ORDER — PROCHLORPERAZINE 25 MG/1
SUPPOSITORY RECTAL
Qty: 1 EACH | Refills: 3 | Status: SHIPPED | OUTPATIENT
Start: 2023-01-27 | End: 2023-07-21

## 2023-01-27 RX ORDER — INSULIN ASPART 100 [IU]/ML
INJECTION, SOLUTION INTRAVENOUS; SUBCUTANEOUS
Qty: 50 ML | Refills: 3 | Status: SHIPPED | OUTPATIENT
Start: 2023-01-27 | End: 2023-07-21

## 2023-01-27 RX ORDER — LISINOPRIL 5 MG/1
15 TABLET ORAL DAILY
Qty: 270 TABLET | Refills: 4 | Status: SHIPPED | OUTPATIENT
Start: 2023-01-27 | End: 2023-07-21

## 2023-01-27 RX ORDER — PROCHLORPERAZINE 25 MG/1
SUPPOSITORY RECTAL
Qty: 9 EACH | Refills: 3 | Status: SHIPPED | OUTPATIENT
Start: 2023-01-27 | End: 2023-07-21

## 2023-01-27 RX ORDER — INSULIN GLARGINE 100 [IU]/ML
15 INJECTION, SOLUTION SUBCUTANEOUS DAILY
Qty: 15 ML | Refills: 0 | Status: SHIPPED | OUTPATIENT
Start: 2023-01-27 | End: 2023-07-21

## 2023-01-27 NOTE — LETTER
Date:January 27, 2023      Provider requested that no letter be sent. Do not send.       [facility]

## 2023-01-27 NOTE — PROGRESS NOTES
Video-Visit Details    Type of service:  Video Visit    Virtual visit conducted by Jerald.      Originating Location (pt. Location): HOME    Distant Location (provider location):  Off site    Mode of Communication:  Video Conference via [facility]    Physician has received verbal consent for a Video Visit from the patient? YES      Bushra Rosa MD    Start time 1105, stop time 1120, chart review, documentation time, coordination of care, 5 minutes.  Total time spent day of encounter 20 minutes.    Chief Complaint:   Diabetes     History of Present Illness:   Joseph O Thoen is a 42 year old male with a history of type I diabetes mellitus who presents alone for follow-up of diabetes.    #1 Type I diabetes mellitus   The patient was initially seen in May 2011 at which time he had a 30 pound weight loss with polyuria, polydipsia, serum glucose of 466, and Hemoglobin A1c of 13.5%.  His C-peptide was 0.8 with a glucose of 318.  DEMARCO antibodies were negative.  He was initially started on Metformin however his blood sugars did not improve therefore he was switched to Lantus and Novolog.  Blood work in September 2011 was significant for negative anti-islet and anti-insulin antibodies.  Since starting insulin, his diabetes has been well controlled with Hemoglobin A1c values all <7%.  He started on a Medtronic insulin pump in July 2012 and switched to the closed-loop system in January 2018.  The patient did not like the Medtronic closed-loop system due to the alarms.  so he is on the Medtronic pump with the Dexcom G6 sensor (started in January 2021).  He reports that he is extremely happy with the Dexcom G6 sensor and feels that this has been useful.    October 2021 hemoglobin A1c of 5.9. The patient has been on the tandem t:slim pump with control IQ since roughly November 2021.  He is very satisfied with the program.  July 2022 hemoglobin A1c of 5.6.      Interval history: Patient continues on lisinopril 15 mg daily.   "July 2022 labs show negative urine for protein, negative TTG, hemoglobin A1c of 5.6, vitamin D of 42, and TSH of 0.72.  He takes roughly 46 units of insulin daily, 15 units basal, 30 units by bolus.  He has noted some hyperglycemia in the afternoon and reports that \"sometimes it is harder for his blood sugars to go down in the afternoon.\"    Blood Glucose Monitoring:  Reviewing his CGM, average glucose 139    Current Insulin Pump Settings:  Tandem t:slim pump with control IQ  Basal rate   midnight to 10 AM: 0.65 units/h  10 AM to 6 PM: 0.7 units/h  6 PM to 11 PM: 0.65 units/h  11 PM to midnight: 0.575 units/h    Correction:  9:30 PM to 6 AM: 1 per 60  6 AM to 9:30 PM: 1 per 50    Carb ratio:  6 AM to 9:30 PM: 1 per 9 g carbohydrate  9 PM to 6 AM: 1 per 12 g carbohydrate    Target blood glucose 120    Diabetes monitoring and complications:  CAD: No  Last eye exam results: Negative in August 2022  Microalbuminuria: Negative in July 2022, on lisinopril  Neuropathy: No  HTN: Yes, on Lisinopril   On Statin: No  On Aspirin: No  Depression: No  Erectile dysfunction: No     #2 Low vitamin D  There is a history of low vitamin D.      Interval history: July 2022 vitamin D was normal at 42.    Review of Systems:   Pertinent items are noted in HPI.  All other systems are negative.    Active Medications:   Current Outpatient Medications   Medication Sig Dispense Refill     acetone, Urine, test STRP 1 each by In Vitro route as needed. 50 strip 3     blood glucose (TOSHA CONTOUR) test strip strips work with meter / pump. Test 6 times daily  Tosha contour  NEXT strips 600 strip 3     Blood Pressure Monitor KIT Take bp 2 times a week or PRN problems.  Dx DM 1 kit 0     Continuous Blood Gluc Sensor (DEXCOM G6 SENSOR) MISC Change every 10 days. 9 each 3     Continuous Blood Gluc Transmit (DEXCOM G6 TRANSMITTER) MISC Change every 3 months. 1 each 3     Glucagon (BAQSIMI TWO PACK) 3 MG/DOSE POWD Spray 1 Application in nostril as needed " "(as needed) Cost checking only 1 each 1     insulin aspart (NOVOLOG VIAL) 100 UNITS/ML vial Use in pump approx 50 units daily 50 mL 3     insulin cartridge (T:SLIM 3ML) misc pump supply Insulin cartridge to be used with pump as directed.  Change every 2-3 days or as directed. 40 each 1     insulin glargine (LANTUS PEN) 100 UNIT/ML pen Pt to be on lantus 15 units daily if pump fails 3 mL 0     insulin glargine (LANTUS SOLOSTAR) 100 UNIT/ML pen Inject 15 Units Subcutaneous daily In case of pump failure. 15 mL 0     Insulin Infusion Pump Supplies (AUTOSOFT XC INFUSION SET) MISC 1 each every 3 days Autosoft XC 9 mm cannula, 23 inch tubing. 40 each 1     lisinopril (ZESTRIL) 5 MG tablet Take 3 tablets (15 mg) by mouth daily Take 3 tablet (15 mg) daily. 270 tablet 4     MICROLET LANCETS MISC 1 each 6 times daily 200 each 11     valACYclovir (VALTREX) 1000 mg tablet Take 1 tablet (1,000 mg) by mouth 3 times daily for 7 days 21 tablet 0     Allergies:   Benadryl [altaryl]      Past Medical History:  Type I diabetes mellitus   Hypertension      Past Surgical History:  History reviewed. No pertinent past surgical history.      Family History:   Thyroid disease - mother  Hypertension - mother      Social History:   The patient works at the [employer].     Physical Exam:   GENERAL: Healthy, alert and no distress\",\"EYES: Eyes grossly normal to inspection.  No discharge or erythema, or obvious scleral/conjunctival abnormalities.\",\"RESP: No audible wheeze, cough, or visible cyanosis.  No visible retractions or increased work of breathing.  \",\"SKIN: Visible skin clear. No significant rash, abnormal pigmentation or lesions.\",\"NEURO: Cranial nerves grossly intact.  Mentation and speech appropriate for age.\",\"PSYCH: Mentation appears normal, affect normal/bright, judgement and insight intact, normal speech and appearance well-groomed.    Data:  No visits with results within 6 Month(s) from this visit.   Latest known visit " with results is:   Lab on 07/15/2022   Component Date Value Ref Range Status     25 OH Vitamin D2 07/15/2022 <5  ug/L Final     25 OH Vitamin D3 07/15/2022 37  ug/L Final     25 OH Vit D Total 07/15/2022 <42  20 - 75 ug/L Final    Season, race, dietary intake, and treatment affect the concentration of 25-hydroxy-Vitamin D. Values may decrease during winter months and increase during summer months. Values 20-29 ug/L may indicate Vitamin D insufficiency and values <20 ug/L may indicate Vitamin D deficiency.     Cholesterol 07/15/2022 126  <200 mg/dL Final     Triglycerides 07/15/2022 52  <150 mg/dL Final     Direct Measure HDL 07/15/2022 67  >=40 mg/dL Final     LDL Cholesterol Calculated 07/15/2022 49  <=100 mg/dL Final     Non HDL Cholesterol 07/15/2022 59  <130 mg/dL Final     Patient Fasting > 8hrs? 07/15/2022 Unknown   Final     Creatinine Urine mg/dL 07/15/2022 11  mg/dL Final     Albumin Urine mg/L 07/15/2022 <5  mg/L Final     Albumin Urine mg/g Cr 07/15/2022    Final    Unable to calculate, urine albumin or creatinine is outside the detectable limits.     Sodium 07/15/2022 141  133 - 144 mmol/L Final     Potassium 07/15/2022 3.7  3.4 - 5.3 mmol/L Final     Chloride 07/15/2022 106  94 - 109 mmol/L Final     Carbon Dioxide (CO2) 07/15/2022 26  20 - 32 mmol/L Final     Anion Gap 07/15/2022 9  3 - 14 mmol/L Final     Urea Nitrogen 07/15/2022 8  7 - 30 mg/dL Final     Creatinine 07/15/2022 0.66  0.66 - 1.25 mg/dL Final     Calcium 07/15/2022 9.3  8.5 - 10.1 mg/dL Final     Glucose 07/15/2022 47 (LL)  70 - 99 mg/dL Final     GFR Estimate 07/15/2022 >90  >60 mL/min/1.73m2 Final    Effective December 21, 2021 eGFRcr in adults is calculated using the 2021 CKD-EPI creatinine equation which includes age and gender (Je et al., NEJM, DOI: 10.1056/OGUHma3441622)     TSH 07/15/2022 0.72  0.40 - 4.00 mU/L Final     Free T4 07/15/2022 0.95  0.76 - 1.46 ng/dL Final     Tissue Transglutaminase Antibody I* 07/15/2022 0.5   <7.0 U/mL Final    Negative- The tTG-IgA assay has limited utility for patients with decreased levels of IgA. Screening for celiac disease should include IgA testing to rule out selective IgA deficiency and to guide selection and interpretation of serological testing. tTG-IgG testing may be positive in celiac disease patients with IgA deficiency.     Tissue Transglutaminase Antibody I* 07/15/2022 <0.6  <7.0 U/mL Final    Negative       Assessment and Plan:  #1 Type I diabetes mellitus   Patient is now on a t:slim pump with control IQ.  I do think he needs a little bit more basal rate.  He has noted some hypoglycemia particularly in afternoon.  We will increase correction coverage and basal rate as noted below    Current Insulin Pump Settings:  Tandem t:slim pump with control IQ  Basal rate   0.7 units/h    Correction:  9:30 PM to 6 AM: 1 per 50  6 AM to 11 PM: 1 per 40    Carb ratio:  6 AM to 11 PM: 1 per 9 g carbohydrate  11 PM to 6 AM: 1 per 12 g carbohydrate    Target blood glucose   6 AM to 11 PM: Target blood glucose 110   11 PM to 6 AM: Target blood glucose 120    #2 hypertension  Continue lisinopril 15 mg daily-prescription sent to pharmacy.    Evaluation below within the next few months.  Return visit in 6 months.

## 2023-01-27 NOTE — PROGRESS NOTES
Joseph O Thoen  is being evaluated via a billable video visit.      How would you like to obtain your AVS? MyChart  For the video visit, send the invitation by: Send to e-mail at: [email or web address]  Will anyone else be joining your video visit? No

## 2023-01-27 NOTE — LETTER
1/27/2023       RE: Joseph O Thoen  [address] [facility] [place] [address]     Dear Colleague,    Thank you for referring your patient, Joseph O Thoen, to the [facility] [place] [facility]. Please see a copy of my visit note below.    Outcome for 01/20/23 9:34 AM: [employer] message sent  Taylor Myhre, VF  Outcome for 01/25/23 1:22 PM: Left Voicemail   ALONDRA Ohara  Outcome for 01/25/23 1:22 PM: [employer] message sent  ALONDRA Ohara   Outcome for 01/26/23 9:23 AM: Left Voicemail   Taylor Myhre, VF  Outcome for 01/26/23 3:10 PM: Tandem emailed to provider  ALONDRA Ohara                  Video-Visit Details    Type of service:  Video Visit    Virtual visit conducted by Jerlad.      Originating Location (pt. Location): HOME    Distant Location (provider location):  Off site    Mode of Communication:  Video Conference via [employer]api    Physician has received verbal consent for a Video Visit from the patient? YES      Bushra Rosa MD    Start time 1105, stop time 1120, chart review, documentation time, coordination of care, 5 minutes.  Total time spent day of encounter 20 minutes.    Chief Complaint:   Diabetes     History of Present Illness:   Joseph O Thoen is a 42 year old male with a history of type I diabetes mellitus who presents alone for follow-up of diabetes.    #1 Type I diabetes mellitus   The patient was initially seen in May 2011 at which time he had a 30 pound weight loss with polyuria, polydipsia, serum glucose of 466, and Hemoglobin A1c of 13.5%.  His C-peptide was 0.8 with a glucose of 318.  DEMARCO antibodies were negative.  He was initially started on Metformin however his blood sugars did not improve therefore he was switched to Lantus and Novolog.  Blood work in September 2011 was significant for negative anti-islet and anti-insulin antibodies.  Since starting insulin, his diabetes has been well  "controlled with Hemoglobin A1c values all <7%.  He started on a Medtronic insulin pump in July 2012 and switched to the closed-loop system in January 2018.  The patient did not like the Medtronic closed-loop system due to the alarms.  so he is on the Medtronic pump with the Dexcom G6 sensor (started in January 2021).  He reports that he is extremely happy with the Dexcom G6 sensor and feels that this has been useful.    October 2021 hemoglobin A1c of 5.9. The patient has been on the tandem t:slim pump with control IQ since roughly November 2021.  He is very satisfied with the program.  July 2022 hemoglobin A1c of 5.6.      Interval history: Patient continues on lisinopril 15 mg daily.  July 2022 labs show negative urine for protein, negative TTG, hemoglobin A1c of 5.6, vitamin D of 42, and TSH of 0.72.  He takes roughly 46 units of insulin daily, 15 units basal, 30 units by bolus.  He has noted some hyperglycemia in the afternoon and reports that \"sometimes it is harder for his blood sugars to go down in the afternoon.\"    Blood Glucose Monitoring:  Reviewing his CGM, average glucose 139    Current Insulin Pump Settings:  Tandem t:slim pump with control IQ  Basal rate   midnight to 10 AM: 0.65 units/h  10 AM to 6 PM: 0.7 units/h  6 PM to 11 PM: 0.65 units/h  11 PM to midnight: 0.575 units/h    Correction:  9:30 PM to 6 AM: 1 per 60  6 AM to 9:30 PM: 1 per 50    Carb ratio:  6 AM to 9:30 PM: 1 per 9 g carbohydrate  9 PM to 6 AM: 1 per 12 g carbohydrate    Target blood glucose 120    Diabetes monitoring and complications:  CAD: No  Last eye exam results: Negative in August 2022  Microalbuminuria: Negative in July 2022, on lisinopril  Neuropathy: No  HTN: Yes, on Lisinopril   On Statin: No  On Aspirin: No  Depression: No  Erectile dysfunction: No     #2 Low vitamin D  There is a history of low vitamin D.      Interval history: July 2022 vitamin D was normal at 42.    Review of Systems:   Pertinent items are noted in " HPI.  All other systems are negative.    Active Medications:   Current Outpatient Medications   Medication Sig Dispense Refill     acetone, Urine, test STRP 1 each by In Vitro route as needed. 50 strip 3     blood glucose (TOSHA CONTOUR) test strip strips work with meter / pump. Test 6 times daily  Tosha contour  NEXT strips 600 strip 3     Blood Pressure Monitor KIT Take bp 2 times a week or PRN problems.  Dx DM 1 kit 0     Continuous Blood Gluc Sensor (DEXCOM G6 SENSOR) MISC Change every 10 days. 9 each 3     Continuous Blood Gluc Transmit (DEXCOM G6 TRANSMITTER) MISC Change every 3 months. 1 each 3     Glucagon (BAQSIMI TWO PACK) 3 MG/DOSE POWD Spray 1 Application in nostril as needed (as needed) Cost checking only 1 each 1     insulin aspart (NOVOLOG VIAL) 100 UNITS/ML vial Use in pump approx 50 units daily 50 mL 3     insulin cartridge (T:SLIM 3ML) misc pump supply Insulin cartridge to be used with pump as directed.  Change every 2-3 days or as directed. 40 each 1     insulin glargine (LANTUS PEN) 100 UNIT/ML pen Pt to be on lantus 15 units daily if pump fails 3 mL 0     insulin glargine (LANTUS SOLOSTAR) 100 UNIT/ML pen Inject 15 Units Subcutaneous daily In case of pump failure. 15 mL 0     Insulin Infusion Pump Supplies (AUTOSOFT XC INFUSION SET) MISC 1 each every 3 days Aut[employer]oft XC 9 mm cannula, 23 inch tubing. 40 each 1     lisinopril (ZESTRIL) 5 MG tablet Take 3 tablets (15 mg) by mouth daily Take 3 tablet (15 mg) daily. 270 tablet 4     MICROLET LANCETS MISC 1 each 6 times daily 200 each 11     valACYclovir (VALTREX) 1000 mg tablet Take 1 tablet (1,000 mg) by mouth 3 times daily for 7 days 21 tablet 0     Allergies:   Benadryl [altaryl]      Past Medical History:  Type I diabetes mellitus   Hypertension      Past Surgical History:  History reviewed. No pertinent past surgical history.      Family History:   Thyroid disease - mother  Hypertension - mother      Social History:   The patient works at the  "[employer].     Physical Exam:   GENERAL: Healthy, alert and no distress\",\"EYES: Eyes grossly normal to inspection.  No discharge or erythema, or obvious scleral/conjunctival abnormalities.\",\"RESP: No audible wheeze, cough, or visible cyanosis.  No visible retractions or increased work of breathing.  \",\"SKIN: Visible skin clear. No significant rash, abnormal pigmentation or lesions.\",\"NEURO: Cranial nerves grossly intact.  Mentation and speech appropriate for age.\",\"PSYCH: Mentation appears normal, affect normal/bright, judgement and insight intact, normal speech and appearance well-groomed.    Data:  No visits with results within 6 Month(s) from this visit.   Latest known visit with results is:   Lab on 07/15/2022   Component Date Value Ref Range Status     25 OH Vitamin D2 07/15/2022 <5  ug/L Final     25 OH Vitamin D3 07/15/2022 37  ug/L Final     25 OH Vit D Total 07/15/2022 <42  20 - 75 ug/L Final    Season, race, dietary intake, and treatment affect the concentration of 25-hydroxy-Vitamin D. Values may decrease during winter months and increase during summer months. Values 20-29 ug/L may indicate Vitamin D insufficiency and values <20 ug/L may indicate Vitamin D deficiency.     Cholesterol 07/15/2022 126  <200 mg/dL Final     Triglycerides 07/15/2022 52  <150 mg/dL Final     Direct Measure HDL 07/15/2022 67  >=40 mg/dL Final     LDL Cholesterol Calculated 07/15/2022 49  <=100 mg/dL Final     Non HDL Cholesterol 07/15/2022 59  <130 mg/dL Final     Patient Fasting > 8hrs? 07/15/2022 Unknown   Final     Creatinine Urine mg/dL 07/15/2022 11  mg/dL Final     Albumin Urine mg/L 07/15/2022 <5  mg/L Final     Albumin Urine mg/g Cr 07/15/2022    Final    Unable to calculate, urine albumin or creatinine is outside the detectable limits.     Sodium 07/15/2022 141  133 - 144 mmol/L Final     Potassium 07/15/2022 3.7  3.4 - 5.3 mmol/L Final     Chloride 07/15/2022 106  94 - 109 mmol/L Final     Carbon Dioxide " (CO2) 07/15/2022 26  20 - 32 mmol/L Final     Anion Gap 07/15/2022 9  3 - 14 mmol/L Final     Urea Nitrogen 07/15/2022 8  7 - 30 mg/dL Final     Creatinine 07/15/2022 0.66  0.66 - 1.25 mg/dL Final     Calcium 07/15/2022 9.3  8.5 - 10.1 mg/dL Final     Glucose 07/15/2022 47 (LL)  70 - 99 mg/dL Final     GFR Estimate 07/15/2022 >90  >60 mL/min/1.73m2 Final    Effective December 21, 2021 eGFRcr in adults is calculated using the 2021 CKD-EPI creatinine equation which includes age and gender (Je et al., NEJ, DOI: 10.1056/LBUJtw1191692)     TSH 07/15/2022 0.72  0.40 - 4.00 mU/L Final     Free T4 07/15/2022 0.95  0.76 - 1.46 ng/dL Final     Tissue Transglutaminase Antibody I* 07/15/2022 0.5  <7.0 U/mL Final    Negative- The tTG-IgA assay has limited utility for patients with decreased levels of IgA. Screening for celiac disease should include IgA testing to rule out selective IgA deficiency and to guide selection and interpretation of serological testing. tTG-IgG testing may be positive in celiac disease patients with IgA deficiency.     Tissue Transglutaminase Antibody I* 07/15/2022 <0.6  <7.0 U/mL Final    Negative       Assessment and Plan:  #1 Type I diabetes mellitus   Patient is now on a t:slim pump with control IQ.  I do think he needs a little bit more basal rate.  He has noted some hypoglycemia particularly in afternoon.  We will increase correction coverage and basal rate as noted below    Current Insulin Pump Settings:  Tandem t:slim pump with control IQ  Basal rate   0.7 units/h    Correction:  9:30 PM to 6 AM: 1 per 50  6 AM to 11 PM: 1 per 40    Carb ratio:  6 AM to 11 PM: 1 per 9 g carbohydrate  11 PM to 6 AM: 1 per 12 g carbohydrate    Target blood glucose   6 AM to 11 PM: Target blood glucose 110   11 PM to 6 AM: Target blood glucose 120    #2 hypertension  Continue lisinopril 15 mg daily-prescription sent to pharmacy.    Evaluation below within the next few months.  Return visit in 6 months.    Jose  O Thoen  is being evaluated via a billable video visit.      How would you like to obtain your AVS? MyChart  For the video visit, send the invitation by: Send to e-mail at: [email or web address]  Will anyone else be joining your video visit? No            Again, thank you for allowing me to participate in the care of your patient.      Sincerely,    Bushra Rosa MD

## 2023-01-30 ENCOUNTER — TELEPHONE (OUTPATIENT)
Dept: ENDOCRINOLOGY | Facility: CLINIC | Age: 43
End: 2023-01-30
Payer: COMMERCIAL

## 2023-04-23 ENCOUNTER — HEALTH MAINTENANCE LETTER (OUTPATIENT)
Age: 43
End: 2023-04-23

## 2023-06-02 ENCOUNTER — HEALTH MAINTENANCE LETTER (OUTPATIENT)
Age: 43
End: 2023-06-02

## 2023-07-20 ENCOUNTER — LAB (OUTPATIENT)
Dept: LAB | Facility: CLINIC | Age: 43
End: 2023-07-20
Payer: COMMERCIAL

## 2023-07-20 DIAGNOSIS — E10.9 CONTROLLED DIABETES MELLITUS TYPE 1 WITHOUT COMPLICATIONS (H): ICD-10-CM

## 2023-07-20 LAB
ANION GAP SERPL CALCULATED.3IONS-SCNC: 10 MMOL/L (ref 7–15)
BUN SERPL-MCNC: 8 MG/DL (ref 6–20)
CALCIUM SERPL-MCNC: 9.5 MG/DL (ref 8.6–10)
CHLORIDE SERPL-SCNC: 103 MMOL/L (ref 98–107)
CHOLEST SERPL-MCNC: 148 MG/DL
CREAT SERPL-MCNC: 0.73 MG/DL (ref 0.67–1.17)
DEPRECATED HCO3 PLAS-SCNC: 28 MMOL/L (ref 22–29)
GFR SERPL CREATININE-BSD FRML MDRD: >90 ML/MIN/1.73M2
GLUCOSE SERPL-MCNC: 113 MG/DL (ref 70–99)
HDLC SERPL-MCNC: 69 MG/DL
LDLC SERPL CALC-MCNC: 71 MG/DL
NONHDLC SERPL-MCNC: 79 MG/DL
POTASSIUM SERPL-SCNC: 3.9 MMOL/L (ref 3.4–5.3)
SODIUM SERPL-SCNC: 141 MMOL/L (ref 136–145)
TRIGL SERPL-MCNC: 40 MG/DL
TSH SERPL DL<=0.005 MIU/L-ACNC: 0.79 UIU/ML (ref 0.3–4.2)

## 2023-07-20 PROCEDURE — 83036 HEMOGLOBIN GLYCOSYLATED A1C: CPT | Performed by: INTERNAL MEDICINE

## 2023-07-20 PROCEDURE — 84443 ASSAY THYROID STIM HORMONE: CPT | Performed by: PATHOLOGY

## 2023-07-20 PROCEDURE — 99000 SPECIMEN HANDLING OFFICE-LAB: CPT | Performed by: PATHOLOGY

## 2023-07-20 PROCEDURE — 82570 ASSAY OF URINE CREATININE: CPT | Performed by: INTERNAL MEDICINE

## 2023-07-20 PROCEDURE — 82306 VITAMIN D 25 HYDROXY: CPT | Performed by: INTERNAL MEDICINE

## 2023-07-20 PROCEDURE — 36415 COLL VENOUS BLD VENIPUNCTURE: CPT | Performed by: PATHOLOGY

## 2023-07-20 PROCEDURE — 80061 LIPID PANEL: CPT | Performed by: PATHOLOGY

## 2023-07-20 PROCEDURE — 80048 BASIC METABOLIC PNL TOTAL CA: CPT | Performed by: PATHOLOGY

## 2023-07-20 NOTE — PROGRESS NOTES
7/21/23    Chief Complaint:   Diabetes     History of Present Illness:   Joseph O Thoen is a 43 year old male with a history of type I diabetes mellitus who presents alone for follow-up of diabetes.  This was an in person visit.    30 minutes spent on pt on the day of the encounter including documentation time, chart review, discussion with pt, and coordination of care    #1 Type I diabetes mellitus   The patient was initially seen in May 2011 at which time he had a 30 pound weight loss with polyuria, polydipsia, serum glucose of 466, and Hemoglobin A1c of 13.5%.  His C-peptide was 0.8 with a glucose of 318.  DEMARCO antibodies were negative.  He was initially started on Metformin however his blood sugars did not improve therefore he was switched to Lantus and Novolog.  Blood work in September 2011 was significant for negative anti-islet and anti-insulin antibodies.  Since starting insulin, his diabetes has been well controlled with Hemoglobin A1c values all <7%.  He started on a Medtronic insulin pump in July 2012 and switched to the closed-loop system in January 2018.  The patient did not like the Medtronic closed-loop system due to the alarms.  so he is on the Medtronic pump with the Dexcom G6 sensor (started in January 2021).  He reports that he is extremely happy with the Dexcom G6 sensor and feels that this has been useful.    October 2021 hemoglobin A1c of 5.9. The patient has been on the tandem t:slim pump with control IQ since roughly November 2021.  He is very satisfied with the program.  July 2022 hemoglobin A1c of 5.6.      Interval history: Patient continues on lisinopril 15 mg daily.  July 2023 hemoglobin A1c 5.8.  He has noted some pounds of hypoglycemia overnight.  However he is overall very satisfied with his t:slim pump with control IQ.    Current Insulin Pump Settings:  Tandem t:slim pump with control IQ  Basal rate:  Midnight to 11 PM: 0.7 units/h  11 PM to midnight: 0.575 units/h    Insulin  sensitivity:  Midnight to 6 AM: 1 per 50  6 AM to 11 PM: 1 per 40  11 PM to midnight: 1 per 60    Carb coverage:  Midnight to 6 AM: 1 per 12  6 AM to 11 PM: 1 per 9  11 PM to midnight: 1 per 12    Diabetes monitoring and complications:  CAD: No  Last eye exam results: Negative in August 2022  Microalbuminuria: Negative in July 2023, on lisinopril  Neuropathy: No  HTN: Yes, on Lisinopril   On Statin: No  On Aspirin: No  Depression: No  Erectile dysfunction: No     #2 Low vitamin D  There is a history of low vitamin D.   July 2022 vitamin D was normal at 42.    Interval history: Recheck vitamin D is pending    Review of Systems:   Pertinent items are noted in HPI.  All other systems are negative.    Active Medications:   Current Outpatient Medications   Medication Sig Dispense Refill     acetone, Urine, test STRP 1 each by In Vitro route as needed. 50 strip 3     blood glucose (TOSHA CONTOUR) test strip strips work with meter / pump. Test 6 times daily  Tosha contour  NEXT strips 600 strip 3     Blood Pressure Monitor KIT Take bp 2 times a week or PRN problems.  Dx DM 1 kit 0     Continuous Blood Gluc Sensor (DEXCOM G6 SENSOR) MISC Change every 10 days. 9 each 3     Continuous Blood Gluc Transmit (DEXCOM G6 TRANSMITTER) MISC Change every 3 months. 1 each 3     Glucagon (BAQSIMI TWO PACK) 3 MG/DOSE POWD Spray 1 Application in nostril as needed (as needed) Cost checking only 1 each 1     insulin aspart (NOVOLOG VIAL) 100 UNITS/ML vial Use in pump approx 50 units daily 50 mL 3     insulin cartridge (T:SLIM 3ML) misc pump supply Insulin cartridge to be used with pump as directed.  Change every 2-3 days or as directed. 40 each 1     insulin glargine (LANTUS PEN) 100 UNIT/ML pen Pt to be on lantus 15 units daily if pump fails 3 mL 0     insulin glargine (LANTUS SOLOSTAR) 100 UNIT/ML pen Inject 15 Units Subcutaneous daily In case of pump failure. 15 mL 0     Insulin Infusion Pump Supplies ("[employer] XC INFUSION SET) MISC 1  "each every 3 days [employer] XC 9 mm cannula, 23 inch tubing. 40 each 1     lisinopril (ZESTRIL) 5 MG tablet Take 3 tablets (15 mg) by mouth daily Take 3 tablet (15 mg) daily. 270 tablet 4     MICROLET LANCETS MISC 1 each 6 times daily 200 each 11     valACYclovir (VALTREX) 1000 mg tablet Take 1 tablet (1,000 mg) by mouth 3 times daily for 7 days 21 tablet 0     Allergies:   Benadryl [altaryl]      Past Medical History:  Type I diabetes mellitus   Hypertension      Past Surgical History:  History reviewed. No pertinent past surgical history.      Family History:   Thyroid disease - mother  Hypertension - mother      Social History:   The patient works at the [employer].     Physical Exam:   Blood pressure 121/80, pulse 65, height 1.778 m (5' 10\"), weight 73 kg (161 lb), SpO2 98 %.    GENERAL APPEARANCE: Alert and no distress  NECK: No lymphadenopathy appreciated  Thyroid: No obvious nodules palpated   CV: RRR without M/R/G  Lungs: CTA bilaterally  Abdomen: Soft, Nontender, non distended, positive bowel sounds   Neuro: normal reflexes, no focal deficits  Skin: No infection in feet   Mood: Normal   Lymph: neg in neck and supraclavicular area    Data:  Office Visit on 07/21/2023   Component Date Value Ref Range Status     Hemoglobin A1C POCT 07/21/2023 5.8  4.3 - <5.7 % Final   Lab on 07/20/2023   Component Date Value Ref Range Status     Hemoglobin A1C 07/20/2023 5.8 (H)  <5.7 % Final    Normal <5.7%   Prediabetes 5.7-6.4%    Diabetes 6.5% or higher     Note: Adopted from ADA consensus guidelines.     Sodium 07/20/2023 141  136 - 145 mmol/L Final     Potassium 07/20/2023 3.9  3.4 - 5.3 mmol/L Final     Chloride 07/20/2023 103  98 - 107 mmol/L Final     Carbon Dioxide (CO2) 07/20/2023 28  22 - 29 mmol/L Final     Anion Gap 07/20/2023 10  7 - 15 mmol/L Final     Urea Nitrogen 07/20/2023 8.0  6.0 - 20.0 mg/dL Final     Creatinine 07/20/2023 0.73  0.67 - 1.17 mg/dL Final     Calcium 07/20/2023 9.5  8.6 - 10.0 " mg/dL Final     Glucose 07/20/2023 113 (H)  70 - 99 mg/dL Final     GFR Estimate 07/20/2023 >90  >60 mL/min/1.73m2 Final     Creatinine Urine mg/dL 07/20/2023 58.3  mg/dL Final    The reference ranges have not been established in urine creatinine. The results should be integrated into the clinical context for interpretation.     Albumin Urine mg/L 07/20/2023 <12.0  mg/L Final    The reference ranges have not been established in urine albumin. The results should be integrated into the clinical context for interpretation.     Albumin Urine mg/g Cr 07/20/2023    Final    Unable to calculate, urine albumin and/or urine creatinine is outside detectable limits.  Microalbuminuria is defined as an albumin:creatinine ratio of 17 to 299 for males and 25 to 299 for females. A ratio of albumin:creatinine of 300 or higher is indicative of overt proteinuria.  Due to biologic variability, positive results should be confirmed by a second, first-morning random or 24-hour timed urine specimen. If there is discrepancy, a third specimen is recommended. When 2 out of 3 results are in the microalbuminuria range, this is evidence for incipient nephropathy and warrants increased efforts at glucose control, blood pressure control, and institution of therapy with an angiotensin-converting-enzyme (ACE) inhibitor (if the patient can tolerate it).       Cholesterol 07/20/2023 148  <200 mg/dL Final     Triglycerides 07/20/2023 40  <150 mg/dL Final     Direct Measure HDL 07/20/2023 69  >=40 mg/dL Final     LDL Cholesterol Calculated 07/20/2023 71  <=100 mg/dL Final     Non HDL Cholesterol 07/20/2023 79  <130 mg/dL Final     TSH 07/20/2023 0.79  0.30 - 4.20 uIU/mL Final         Assessment and Plan:  #1 Type I diabetes mellitus   Patient is now on a t:slim pump with control IQ.  Overall doing well.  Referral made to ophthalmology.  Refill sent to pharmacy.  Plan as noted below:    Revise pump settings:  Tandem t:slim pump with control IQ  Basal  rate:  Midnight to 6 am : 0.5 units/hour  6 am to 11 PM: 0.7 units/h  11 PM to midnight: 0.5 units/hour    Insulin sensitivity:  Midnight to 6 AM: 1 per 60  6 AM to 11 PM: 1 per 40  11 PM to midnight: 1 per 60    Carb coverage:  Midnight to 6 AM: 1 per 12  6 AM to 11 PM: 1 per 9  11 PM to midnight: 1 per 12    #2 hypertension  Continue lisinopril 15 mg daily-prescription sent to pharmacy.      Return visit in 6 months and 1 year

## 2023-07-21 ENCOUNTER — OFFICE VISIT (OUTPATIENT)
Dept: ENDOCRINOLOGY | Facility: CLINIC | Age: 43
End: 2023-07-21
Payer: COMMERCIAL

## 2023-07-21 ENCOUNTER — TELEPHONE (OUTPATIENT)
Dept: ENDOCRINOLOGY | Facility: CLINIC | Age: 43
End: 2023-07-21

## 2023-07-21 VITALS
BODY MASS INDEX: 23.05 KG/M2 | SYSTOLIC BLOOD PRESSURE: 121 MMHG | DIASTOLIC BLOOD PRESSURE: 80 MMHG | HEIGHT: 70 IN | OXYGEN SATURATION: 98 % | WEIGHT: 161 LBS | HEART RATE: 65 BPM

## 2023-07-21 DIAGNOSIS — E10.9 CONTROLLED DIABETES MELLITUS TYPE 1 WITHOUT COMPLICATIONS (H): Primary | ICD-10-CM

## 2023-07-21 LAB
CREAT UR-MCNC: 58.3 MG/DL
HBA1C MFR BLD: 5.8 %
HBA1C MFR BLD: 5.8 % (ref 4.3–?)
MICROALBUMIN UR-MCNC: <12 MG/L
MICROALBUMIN/CREAT UR: NORMAL MG/G{CREAT}

## 2023-07-21 PROCEDURE — 83036 HEMOGLOBIN GLYCOSYLATED A1C: CPT | Performed by: INTERNAL MEDICINE

## 2023-07-21 PROCEDURE — 99214 OFFICE O/P EST MOD 30 MIN: CPT | Performed by: INTERNAL MEDICINE

## 2023-07-21 RX ORDER — INSULIN INFUSION SET/CARTRIDGE
1 COMBINATION PACKAGE (EA) MISCELLANEOUS
Qty: 40 EACH | Refills: 1 | Status: SHIPPED | OUTPATIENT
Start: 2023-07-21 | End: 2024-02-23

## 2023-07-21 RX ORDER — PROCHLORPERAZINE 25 MG/1
SUPPOSITORY RECTAL
Qty: 1 EACH | Refills: 3 | Status: SHIPPED | OUTPATIENT
Start: 2023-07-21 | End: 2024-02-23

## 2023-07-21 RX ORDER — PROCHLORPERAZINE 25 MG/1
SUPPOSITORY RECTAL
Qty: 9 EACH | Refills: 3 | Status: SHIPPED | OUTPATIENT
Start: 2023-07-21 | End: 2024-02-23

## 2023-07-21 RX ORDER — INSULIN ASPART 100 [IU]/ML
INJECTION, SOLUTION INTRAVENOUS; SUBCUTANEOUS
Qty: 50 ML | Refills: 3 | Status: SHIPPED | OUTPATIENT
Start: 2023-07-21 | End: 2024-02-23

## 2023-07-21 RX ORDER — LISINOPRIL 5 MG/1
15 TABLET ORAL DAILY
Qty: 270 TABLET | Refills: 4 | Status: SHIPPED | OUTPATIENT
Start: 2023-07-21 | End: 2024-02-23

## 2023-07-21 ASSESSMENT — PAIN SCALES - GENERAL: PAINLEVEL: NO PAIN (0)

## 2023-07-21 NOTE — PATIENT INSTRUCTIONS
Office Visit on 07/21/2023   Component Date Value Ref Range Status    Hemoglobin A1C POCT 07/21/2023 5.8  4.3 - <5.7 % Final   Lab on 07/20/2023   Component Date Value Ref Range Status    Hemoglobin A1C 07/20/2023 5.8 (H)  <5.7 % Final    Normal <5.7%   Prediabetes 5.7-6.4%    Diabetes 6.5% or higher     Note: Adopted from ADA consensus guidelines.    Sodium 07/20/2023 141  136 - 145 mmol/L Final    Potassium 07/20/2023 3.9  3.4 - 5.3 mmol/L Final    Chloride 07/20/2023 103  98 - 107 mmol/L Final    Carbon Dioxide (CO2) 07/20/2023 28  22 - 29 mmol/L Final    Anion Gap 07/20/2023 10  7 - 15 mmol/L Final    Urea Nitrogen 07/20/2023 8.0  6.0 - 20.0 mg/dL Final    Creatinine 07/20/2023 0.73  0.67 - 1.17 mg/dL Final    Calcium 07/20/2023 9.5  8.6 - 10.0 mg/dL Final    Glucose 07/20/2023 113 (H)  70 - 99 mg/dL Final    GFR Estimate 07/20/2023 >90  >60 mL/min/1.73m2 Final    Creatinine Urine mg/dL 07/20/2023 58.3  mg/dL Final    The reference ranges have not been established in urine creatinine. The results should be integrated into the clinical context for interpretation.    Albumin Urine mg/L 07/20/2023 <12.0  mg/L Final    The reference ranges have not been established in urine albumin. The results should be integrated into the clinical context for interpretation.    Albumin Urine mg/g Cr 07/20/2023    Final    Unable to calculate, urine albumin and/or urine creatinine is outside detectable limits.  Microalbuminuria is defined as an albumin:creatinine ratio of 17 to 299 for males and 25 to 299 for females. A ratio of albumin:creatinine of 300 or higher is indicative of overt proteinuria.  Due to biologic variability, positive results should be confirmed by a second, first-morning random or 24-hour timed urine specimen. If there is discrepancy, a third specimen is recommended. When 2 out of 3 results are in the microalbuminuria range, this is evidence for incipient nephropathy and warrants increased efforts at glucose  control, blood pressure control, and institution of therapy with an angiotensin-converting-enzyme (ACE) inhibitor (if the patient can tolerate it).      Cholesterol 07/20/2023 148  <200 mg/dL Final    Triglycerides 07/20/2023 40  <150 mg/dL Final    Direct Measure HDL 07/20/2023 69  >=40 mg/dL Final    LDL Cholesterol Calculated 07/20/2023 71  <=100 mg/dL Final    Non HDL Cholesterol 07/20/2023 79  <130 mg/dL Final    TSH 07/20/2023 0.79  0.30 - 4.20 uIU/mL Final

## 2023-07-21 NOTE — TELEPHONE ENCOUNTER
"Spoke w/  Jasen, states he'll \"see what he can do\"  Christiane Collins, RN on 7/21/2023 at 9:15 AM       Resulting Agency    Name: [facility]   Phone: [phone]     ----- Message from Bushra Rosa MD sent at 7/21/2023  9:07 AM CDT -----  Regarding: URGENT!  Please call to cancel Hgba1c running from lab draw yesterday - he did a POC today and we don't want to double charge him          "

## 2023-07-21 NOTE — LETTER
7/21/2023       RE: Joseph O Thoen  [address] [facility] [address]     Dear Colleague,    Thank you for referring your patient, Joseph O Thoen, to the [facility] [place] at [facility]. Please see a copy of my visit note below.    BG data obtained via CGM website.  Gina Arizmendi                  7/21/23    Chief Complaint:   Diabetes     History of Present Illness:   Joseph O Thoen is a 43 year old male with a history of type I diabetes mellitus who presents alone for follow-up of diabetes.  This was an in person visit.    30 minutes spent on pt on the day of the encounter including documentation time, chart review, discussion with pt, and coordination of care    #1 Type I diabetes mellitus   The patient was initially seen in May 2011 at which time he had a 30 pound weight loss with polyuria, polydipsia, serum glucose of 466, and Hemoglobin A1c of 13.5%.  His C-peptide was 0.8 with a glucose of 318.  DEMARCO antibodies were negative.  He was initially started on Metformin however his blood sugars did not improve therefore he was switched to Lantus and Novolog.  Blood work in September 2011 was significant for negative anti-islet and anti-insulin antibodies.  Since starting insulin, his diabetes has been well controlled with Hemoglobin A1c values all <7%.  He started on a Medtronic insulin pump in July 2012 and switched to the closed-loop system in January 2018.  The patient did not like the Medtronic closed-loop system due to the alarms.  so he is on the Medtronic pump with the Dexcom G6 sensor (started in January 2021).  He reports that he is extremely happy with the Dexcom G6 sensor and feels that this has been useful.    October 2021 hemoglobin A1c of 5.9. The patient has been on the tandem t:slim pump with control IQ since roughly November 2021.  He is very satisfied with the program.  July 2022 hemoglobin A1c of 5.6.       Interval history: Patient continues on lisinopril 15 mg daily.  July 2023 hemoglobin A1c 5.8.  He has noted some pounds of hypoglycemia overnight.  However he is overall very satisfied with his t:slim pump with control IQ.    Current Insulin Pump Settings:  Tandem t:slim pump with control IQ  Basal rate:  Midnight to 11 PM: 0.7 units/h  11 PM to midnight: 0.575 units/h    Insulin sensitivity:  Midnight to 6 AM: 1 per 50  6 AM to 11 PM: 1 per 40  11 PM to midnight: 1 per 60    Carb coverage:  Midnight to 6 AM: 1 per 12  6 AM to 11 PM: 1 per 9  11 PM to midnight: 1 per 12    Diabetes monitoring and complications:  CAD: No  Last eye exam results: Negative in August 2022  Microalbuminuria: Negative in July 2023, on lisinopril  Neuropathy: No  HTN: Yes, on Lisinopril   On Statin: No  On Aspirin: No  Depression: No  Erectile dysfunction: No     #2 Low vitamin D  There is a history of low vitamin D.   July 2022 vitamin D was normal at 42.    Interval history: Recheck vitamin D is pending    Review of Systems:   Pertinent items are noted in HPI.  All other systems are negative.    Active Medications:   Current Outpatient Medications   Medication Sig Dispense Refill    acetone, Urine, test STRP 1 each by In Vitro route as needed. 50 strip 3    blood glucose (TOSHA CONTOUR) test strip strips work with meter / pump. Test 6 times daily  Tosha contour  NEXT strips 600 strip 3    Blood Pressure Monitor KIT Take bp 2 times a week or PRN problems.  Dx DM 1 kit 0    Continuous Blood Gluc Sensor (DEXCOM G6 SENSOR) MISC Change every 10 days. 9 each 3    Continuous Blood Gluc Transmit (DEXCOM G6 TRANSMITTER) MISC Change every 3 months. 1 each 3    Glucagon (BAQSIMI TWO PACK) 3 MG/DOSE POWD Spray 1 Application in nostril as needed (as needed) Cost checking only 1 each 1    insulin aspart (NOVOLOG VIAL) 100 UNITS/ML vial Use in pump approx 50 units daily 50 mL 3    insulin cartridge (T:SLIM 3ML) misc pump supply Insulin cartridge to  "be used with pump as directed.  Change every 2-3 days or as directed. 40 each 1    insulin glargine (LANTUS PEN) 100 UNIT/ML pen Pt to be on lantus 15 units daily if pump fails 3 mL 0    insulin glargine (LANTUS SOLOSTAR) 100 UNIT/ML pen Inject 15 Units Subcutaneous daily In case of pump failure. 15 mL 0    Insulin Infusion Pump Supplies (AUTOSOFT XC INFUSION SET) MISC 1 each every 3 days Autosoft XC 9 mm cannula, 23 inch tubing. 40 each 1    lisinopril (ZESTRIL) 5 MG tablet Take 3 tablets (15 mg) by mouth daily Take 3 tablet (15 mg) daily. 270 tablet 4    MICROLET LANCETS MISC 1 each 6 times daily 200 each 11    valACYclovir (VALTREX) 1000 mg tablet Take 1 tablet (1,000 mg) by mouth 3 times daily for 7 days 21 tablet 0     Allergies:   Benadryl [altaryl]      Past Medical History:  Type I diabetes mellitus   Hypertension      Past Surgical History:  History reviewed. No pertinent past surgical history.      Family History:   Thyroid disease - mother  Hypertension - mother      Social History:   The patient works at the [employer] [place].     Physical Exam:   Blood pressure 121/80, pulse 65, height 1.778 m (5' 10\"), weight 73 kg (161 lb), SpO2 98 %.    GENERAL APPEARANCE: Alert and no distress  NECK: No lymphadenopathy appreciated  Thyroid: No obvious nodules palpated   CV: RRR without M/R/G  Lungs: CTA bilaterally  Abdomen: Soft, Nontender, non distended, positive bowel sounds   Neuro: normal reflexes, no focal deficits  Skin: No infection in feet   Mood: Normal   Lymph: neg in neck and supraclavicular area    Data:  Office Visit on 07/21/2023   Component Date Value Ref Range Status    Hemoglobin A1C POCT 07/21/2023 5.8  4.3 - <5.7 % Final   Lab on 07/20/2023   Component Date Value Ref Range Status    Hemoglobin A1C 07/20/2023 5.8 (H)  <5.7 % Final    Normal <5.7%   Prediabetes 5.7-6.4%    Diabetes 6.5% or higher     Note: Adopted from ADA consensus guidelines.    Sodium 07/20/2023 141  136 - 145 mmol/L Final    " Potassium 07/20/2023 3.9  3.4 - 5.3 mmol/L Final    Chloride 07/20/2023 103  98 - 107 mmol/L Final    Carbon Dioxide (CO2) 07/20/2023 28  22 - 29 mmol/L Final    Anion Gap 07/20/2023 10  7 - 15 mmol/L Final    Urea Nitrogen 07/20/2023 8.0  6.0 - 20.0 mg/dL Final    Creatinine 07/20/2023 0.73  0.67 - 1.17 mg/dL Final    Calcium 07/20/2023 9.5  8.6 - 10.0 mg/dL Final    Glucose 07/20/2023 113 (H)  70 - 99 mg/dL Final    GFR Estimate 07/20/2023 >90  >60 mL/min/1.73m2 Final    Creatinine Urine mg/dL 07/20/2023 58.3  mg/dL Final    The reference ranges have not been established in urine creatinine. The results should be integrated into the clinical context for interpretation.    Albumin Urine mg/L 07/20/2023 <12.0  mg/L Final    The reference ranges have not been established in urine albumin. The results should be integrated into the clinical context for interpretation.    Albumin Urine mg/g Cr 07/20/2023    Final    Unable to calculate, urine albumin and/or urine creatinine is outside detectable limits.  Microalbuminuria is defined as an albumin:creatinine ratio of 17 to 299 for males and 25 to 299 for females. A ratio of albumin:creatinine of 300 or higher is indicative of overt proteinuria.  Due to biologic variability, positive results should be confirmed by a second, first-morning random or 24-hour timed urine specimen. If there is discrepancy, a third specimen is recommended. When 2 out of 3 results are in the microalbuminuria range, this is evidence for incipient nephropathy and warrants increased efforts at glucose control, blood pressure control, and institution of therapy with an angiotensin-converting-enzyme (ACE) inhibitor (if the patient can tolerate it).      Cholesterol 07/20/2023 148  <200 mg/dL Final    Triglycerides 07/20/2023 40  <150 mg/dL Final    Direct Measure HDL 07/20/2023 69  >=40 mg/dL Final    LDL Cholesterol Calculated 07/20/2023 71  <=100 mg/dL Final    Non HDL Cholesterol 07/20/2023 79   <130 mg/dL Final    TSH 07/20/2023 0.79  0.30 - 4.20 uIU/mL Final         Assessment and Plan:  #1 Type I diabetes mellitus   Patient is now on a t:slim pump with control IQ.  Overall doing well.  Referral made to ophthalmology.  Refill sent to pharmacy.  Plan as noted below:    Revise pump settings:  Tandem t:slim pump with control IQ  Basal rate:  Midnight to 6 am : 0.5 units/hour  6 am to 11 PM: 0.7 units/h  11 PM to midnight: 0.5 units/hour    Insulin sensitivity:  Midnight to 6 AM: 1 per 60  6 AM to 11 PM: 1 per 40  11 PM to midnight: 1 per 60    Carb coverage:  Midnight to 6 AM: 1 per 12  6 AM to 11 PM: 1 per 9  11 PM to midnight: 1 per 12    #2 hypertension  Continue lisinopril 15 mg daily-prescription sent to pharmacy.      Return visit in 6 months and 1 year      Again, thank you for allowing me to participate in the care of your patient.      Sincerely,    Bushra Rosa MD

## 2023-07-23 LAB
DEPRECATED CALCIDIOL+CALCIFEROL SERPL-MC: <50 UG/L (ref 20–75)
VITAMIN D2 SERPL-MCNC: <5 UG/L
VITAMIN D3 SERPL-MCNC: 45 UG/L

## 2023-07-24 ENCOUNTER — TELEPHONE (OUTPATIENT)
Dept: ENDOCRINOLOGY | Facility: CLINIC | Age: 43
End: 2023-07-24
Payer: COMMERCIAL

## 2023-07-24 NOTE — TELEPHONE ENCOUNTER
Labs noted below    -  Dear Yung    Here are your labs.  Your vitamin D came back and was normal.  In short, everything looks amazing.    If you have any questions, please feel free to contact my nurse at [phone] select option #3 for triage nurse  or  option #1 for scheduling related questions.    Regards    Bushra Rosa MD     Office Visit on 07/21/2023   Component Date Value Ref Range Status     Hemoglobin A1C POCT 07/21/2023 5.8  4.3 - <5.7 % Final   Lab on 07/20/2023   Component Date Value Ref Range Status     Hemoglobin A1C 07/20/2023 5.8 (H)  <5.7 % Final    Normal <5.7%   Prediabetes 5.7-6.4%    Diabetes 6.5% or higher     Note: Adopted from ADA consensus guidelines.     Sodium 07/20/2023 141  136 - 145 mmol/L Final     Potassium 07/20/2023 3.9  3.4 - 5.3 mmol/L Final     Chloride 07/20/2023 103  98 - 107 mmol/L Final     Carbon Dioxide (CO2) 07/20/2023 28  22 - 29 mmol/L Final     Anion Gap 07/20/2023 10  7 - 15 mmol/L Final     Urea Nitrogen 07/20/2023 8.0  6.0 - 20.0 mg/dL Final     Creatinine 07/20/2023 0.73  0.67 - 1.17 mg/dL Final     Calcium 07/20/2023 9.5  8.6 - 10.0 mg/dL Final     Glucose 07/20/2023 113 (H)  70 - 99 mg/dL Final     GFR Estimate 07/20/2023 >90  >60 mL/min/1.73m2 Final     Creatinine Urine mg/dL 07/20/2023 58.3  mg/dL Final    The reference ranges have not been established in urine creatinine. The results should be integrated into the clinical context for interpretation.     Albumin Urine mg/L 07/20/2023 <12.0  mg/L Final    The reference ranges have not been established in urine albumin. The results should be integrated into the clinical context for interpretation.     Albumin Urine mg/g Cr 07/20/2023    Final    Unable to calculate, urine albumin and/or urine creatinine is outside detectable limits.  Microalbuminuria is defined as an albumin:creatinine ratio of 17 to 299 for males and 25 to 299 for females. A ratio of albumin:creatinine of 300 or higher is indicative of overt  proteinuria.  Due to biologic variability, positive results should be confirmed by a second, first-morning random or 24-hour timed urine specimen. If there is discrepancy, a third specimen is recommended. When 2 out of 3 results are in the microalbuminuria range, this is evidence for incipient nephropathy and warrants increased efforts at glucose control, blood pressure control, and institution of therapy with an angiotensin-converting-enzyme (ACE) inhibitor (if the patient can tolerate it).       Cholesterol 07/20/2023 148  <200 mg/dL Final     Triglycerides 07/20/2023 40  <150 mg/dL Final     Direct Measure HDL 07/20/2023 69  >=40 mg/dL Final     LDL Cholesterol Calculated 07/20/2023 71  <=100 mg/dL Final     Non HDL Cholesterol 07/20/2023 79  <130 mg/dL Final     TSH 07/20/2023 0.79  0.30 - 4.20 uIU/mL Final     25 OH Vitamin D2 07/20/2023 <5  ug/L Final     25 OH Vitamin D3 07/20/2023 45  ug/L Final     25 OH Vit D Total 07/20/2023 <50  20 - 75 ug/L Final    Season, race, dietary intake, and treatment affect the concentration of 25-hydroxy-Vitamin D. Values may decrease during winter months and increase during summer months. Values 20-29 ug/L may indicate Vitamin D insufficiency and values <20 ug/L may indicate Vitamin D deficiency.

## 2023-09-01 ENCOUNTER — OFFICE VISIT (OUTPATIENT)
Dept: OPHTHALMOLOGY | Facility: CLINIC | Age: 43
End: 2023-09-01
Attending: INTERNAL MEDICINE
Payer: COMMERCIAL

## 2023-09-01 DIAGNOSIS — H52.13 MYOPIA OF BOTH EYES: ICD-10-CM

## 2023-09-01 DIAGNOSIS — E10.9 CONTROLLED DIABETES MELLITUS TYPE 1 WITHOUT COMPLICATIONS (H): Primary | ICD-10-CM

## 2023-09-01 PROCEDURE — 92015 DETERMINE REFRACTIVE STATE: CPT | Performed by: OPTOMETRIST

## 2023-09-01 PROCEDURE — 92014 COMPRE OPH EXAM EST PT 1/>: CPT | Performed by: OPTOMETRIST

## 2023-09-01 ASSESSMENT — TONOMETRY
IOP_METHOD: ICARE
OD_IOP_MMHG: 11
OS_IOP_MMHG: 14

## 2023-09-01 ASSESSMENT — CONF VISUAL FIELD
OS_SUPERIOR_TEMPORAL_RESTRICTION: 0
OS_NORMAL: 1
METHOD: COUNTING FINGERS
OD_SUPERIOR_TEMPORAL_RESTRICTION: 0
OS_SUPERIOR_NASAL_RESTRICTION: 0
OS_INFERIOR_NASAL_RESTRICTION: 0
OD_INFERIOR_NASAL_RESTRICTION: 0
OD_INFERIOR_TEMPORAL_RESTRICTION: 0
OD_SUPERIOR_NASAL_RESTRICTION: 0
OD_NORMAL: 1
OS_INFERIOR_TEMPORAL_RESTRICTION: 0

## 2023-09-01 ASSESSMENT — VISUAL ACUITY
METHOD: SNELLEN - LINEAR
CORRECTION_TYPE: GLASSES
OD_CC: 20/20
OS_CC: 20/20

## 2023-09-01 ASSESSMENT — SLIT LAMP EXAM - LIDS
COMMENTS: TR BLEPH
COMMENTS: TR BLEPH

## 2023-09-01 ASSESSMENT — REFRACTION_MANIFEST
OS_CYLINDER: SPHERE
OD_ADD: +1.00
OS_ADD: +1.00
OS_SPHERE: -1.00
OD_SPHERE: -0.75
OD_CYLINDER: SPHERE

## 2023-09-01 ASSESSMENT — EXTERNAL EXAM - LEFT EYE: OS_EXAM: NORMAL

## 2023-09-01 ASSESSMENT — CUP TO DISC RATIO
OS_RATIO: 0.25
OD_RATIO: 0.25

## 2023-09-01 ASSESSMENT — EXTERNAL EXAM - RIGHT EYE: OD_EXAM: NORMAL

## 2023-09-01 NOTE — NURSING NOTE
Chief Complaints and History of Present Illnesses   Patient presents with    Diabetic Eye Exam     Chief Complaint(s) and History of Present Illness(es)       Diabetic Eye Exam              Associated symptoms: Negative for itching, photophobia, recent URI and nausea    Diabetes Type: Type 1    Treatments tried: no treatments    Pain scale: 0/10              Comments    Patient denies any current eye issues.  Lab Results       Component                Value               Date                       A1C                      5.8                 07/20/2023                 A1C                      5.9                 10/08/2021                 A1C                      5.9                 08/28/2013                 A1C                      6.1                 01/18/2013                 A1C                      5.5                 09/14/2012                 A1C                      4.9                 03/02/2012                 A1C                      5.2                 11/03/2011             NEO Borden September 1, 2023 10:34 AM

## 2023-09-01 NOTE — PROGRESS NOTES
A/P  1.) Type 1 Diabetes without ophthalmic manifestation   -Last A1c 5.8, excellent control. No h/o diabetic retinopathy  -Reviewed effects of DM on the eyes and importance of good blood sugar control - he is well aware  -Monitor annually with dilation    2.) Low Myopia OU  -Wearing DVO glasses prn. Otherwise doing well. Continue    Monitor 1 year diabetic eye exam, sooner prn    I have confirmed the patient's CC, HPI and reviewed Past Medical History, Past Surgical History, Social History, Family History, Problem List, Medication List and agree with Tech note.     Blanka Alvarez, OD FAAO FSLS

## 2024-01-10 DIAGNOSIS — E10.9 CONTROLLED DIABETES MELLITUS TYPE 1 WITHOUT COMPLICATIONS (H): ICD-10-CM

## 2024-02-12 NOTE — PROGRESS NOTES
Outcome for 02/12/24 8:24 AM: Data uploaded on [employer]  Saira Soto MA  Outcome for 02/21/24 8:49 AM: Data obtained via [employer] website  Liya Meade LPN

## 2024-02-17 ENCOUNTER — HEALTH MAINTENANCE LETTER (OUTPATIENT)
Age: 44
End: 2024-02-17

## 2024-02-22 NOTE — PROGRESS NOTES
Video-Visit Details    Type of service:  Video Visit    Virtual visit conducted by Jerald.      Originating Location (pt. Location) [facility]    Distant Location (provider location):  Off site    Mode of Communication:  Video Conference via [employer]    Physician has received verbal consent for a Video Visit from the patient? YES      Bushra Roas MD    2/23/24    Chief Complaint:   Diabetes     History of Present Illness:   Joseph O Thoen is a 43 year old male with a history of type I diabetes mellitus who presents alone for follow-up of diabetes.    Start time 932, stop time 942, chart review, documentation time, coordination of care, 10 minutes.  Total time spent day of encounter 20 minutes.    #1 Type I diabetes mellitus   The patient was initially seen in May 2011 at which time he had a 30 pound weight loss with polyuria, polydipsia, serum glucose of 466, and Hemoglobin A1c of 13.5%.  His C-peptide was 0.8 with a glucose of 318.  DEMARCO antibodies were negative.  He was initially started on Metformin however his blood sugars did not improve therefore he was switched to Lantus and Novolog.  Blood work in September 2011 was significant for negative anti-islet and anti-insulin antibodies.  Since starting insulin, his diabetes has been well controlled with Hemoglobin A1c values all <7%.  He started on a Medtronic insulin pump in July 2012 and switched to the closed-loop system in January 2018.  The patient did not like the Medtronic closed-loop system due to the alarms.  so he is on the Medtronic pump with the Dexcom G6 sensor (started in January 2021).  He reports that he is extremely happy with the Dexcom G6 sensor and feels that this has been useful.    October 2021 hemoglobin A1c of 5.9. The patient has been on the tandem t:slim pump with control IQ since roughly November 2021.  He is very satisfied with the program.  July 2022 hemoglobin A1c of 5.6.  July 2022 labs show negative urine for protein, negative TTG,  hemoglobin A1c of 5.6, vitamin D of 42, and TSH of 0.72.   Interval history: Patient continues on lisinopril 15 mg daily.   He has reported some hyperglycemia in the evening.      Blood Glucose Monitoring:  Glucose 132, 80% within range, 15% above range,     Insulin program:  Average daily dose of 48 units, 31% (15 units) basal, 69% (33 units) bolus    Pump settings as noted below:  Basal rate:  Midnight to 6 AM: 0.5 units/h  6 AM to 11 PM: 0.7 units/h  11 PM to midnight: 0.5 units/h    Correction factor:  Midnight to 6 AM: 1 per 60  6 AM to 11 PM: 1 per 40  11 PM to midnight: 1 per 60    Carb ratio:  Midnight to 6 AM: 1 per 12  6 AM to 11 PM: 1 per 9  9 PM to midnight: 1 per 12    Target glucose: 120    Diabetes monitoring and complications:  CAD: July 2023 LDL 71  Last eye exam results: Negative in September 2023  Microalbuminuria: Negative in July 2023, on lisinopril  Neuropathy: No  HTN: Yes, on Lisinopril   On Statin: No  On Aspirin: No  Depression: No  Erectile dysfunction: No     #2 Low vitamin D  There is a history of low vitamin D.   July 2022 vitamin D was normal at 42.    Interval history: July 2023 vitamin D was 50    Review of Systems:   Pertinent items are noted in HPI.  All other systems are negative.    Active Medications:   Current Outpatient Medications   Medication Sig Dispense Refill     acetone, Urine, test STRP 1 each by In Vitro route as needed. 50 strip 3     blood glucose (TOSHA CONTOUR) test strip strips work with meter / pump. Test 6 times daily  Tosha contour  NEXT strips 600 strip 3     Blood Pressure Monitor KIT Take bp 2 times a week or PRN problems.  Dx DM 1 kit 0     Continuous Blood Gluc Sensor (DEXCOM G6 SENSOR) MISC Change every 10 days. 9 each 3     Continuous Blood Gluc Transmit (DEXCOM G6 TRANSMITTER) MISC Change every 3 months. 1 each 3     Glucagon (BAQSIMI TWO PACK) 3 MG/DOSE POWD Spray 1 Application in nostril as needed (as needed) Cost checking only 1 each 1     insulin  "aspart (NOVOLOG VIAL) 100 UNITS/ML vial Use in pump approx 50 units daily 50 mL 3     insulin cartridge (T:SLIM 3ML) misc pump supply Insulin cartridge to be used with pump as directed.  Change every 2-3 days or as directed. 40 each 1     insulin glargine (LANTUS PEN) 100 UNIT/ML pen Pt to be on lantus 15 units daily if pump fails 3 mL 0     Insulin Infusion Pump Supplies (AUTOSOFT XC INFUSION SET) MISC 1 each every 3 days Autosoft XC 9 mm cannula, 23 inch tubing. 40 each 1     lisinopril (ZESTRIL) 5 MG tablet Take 3 tablets (15 mg) by mouth daily Take 3 tablet (15 mg) daily. 270 tablet 4     MICROLET LANCETS MISC 1 each 6 times daily 200 each 11     valACYclovir (VALTREX) 1000 mg tablet Take 1 tablet (1,000 mg) by mouth 3 times daily for 7 days 21 tablet 0     Allergies:   Benadryl [altaryl]      Past Medical History:  Type I diabetes mellitus   Hypertension      Past Surgical History:  History reviewed. No pertinent past surgical history.      Family History:   Thyroid disease - mother  Hypertension - mother      Social History:   The patient works at the [employer].     Physical Exam:   GENERAL: Healthy, alert and no distress\",\"EYES: Eyes grossly normal to inspection.  No discharge or erythema, or obvious scleral/conjunctival abnormalities.\",\"RESP: No audible wheeze, cough, or visible cyanosis.  No visible retractions or increased work of breathing.  \",\"SKIN: Visible skin clear. No significant rash, abnormal pigmentation or lesions.\",\"NEURO: Cranial nerves grossly intact.  Mentation and speech appropriate for age.\",\"PSYCH: Mentation appears normal, affect normal/bright, judgement and insight intact, normal speech and appearance well-groomed.    Data:  Office Visit on 07/21/2023   Component Date Value Ref Range Status     Hemoglobin A1C POCT 07/21/2023 5.8  4.3 - <5.7 % Final   Lab on 07/20/2023   Component Date Value Ref Range Status     Hemoglobin A1C 07/20/2023 5.8 (H)  <5.7 % Final    Normal <5.7% "   Prediabetes 5.7-6.4%    Diabetes 6.5% or higher     Note: Adopted from ADA consensus guidelines.     Sodium 07/20/2023 141  136 - 145 mmol/L Final     Potassium 07/20/2023 3.9  3.4 - 5.3 mmol/L Final     Chloride 07/20/2023 103  98 - 107 mmol/L Final     Carbon Dioxide (CO2) 07/20/2023 28  22 - 29 mmol/L Final     Anion Gap 07/20/2023 10  7 - 15 mmol/L Final     Urea Nitrogen 07/20/2023 8.0  6.0 - 20.0 mg/dL Final     Creatinine 07/20/2023 0.73  0.67 - 1.17 mg/dL Final     Calcium 07/20/2023 9.5  8.6 - 10.0 mg/dL Final     Glucose 07/20/2023 113 (H)  70 - 99 mg/dL Final     GFR Estimate 07/20/2023 >90  >60 mL/min/1.73m2 Final     Creatinine Urine mg/dL 07/20/2023 58.3  mg/dL Final    The reference ranges have not been established in urine creatinine. The results should be integrated into the clinical context for interpretation.     Albumin Urine mg/L 07/20/2023 <12.0  mg/L Final    The reference ranges have not been established in urine albumin. The results should be integrated into the clinical context for interpretation.     Albumin Urine mg/g Cr 07/20/2023    Final    Unable to calculate, urine albumin and/or urine creatinine is outside detectable limits.  Microalbuminuria is defined as an albumin:creatinine ratio of 17 to 299 for males and 25 to 299 for females. A ratio of albumin:creatinine of 300 or higher is indicative of overt proteinuria.  Due to biologic variability, positive results should be confirmed by a second, first-morning random or 24-hour timed urine specimen. If there is discrepancy, a third specimen is recommended. When 2 out of 3 results are in the microalbuminuria range, this is evidence for incipient nephropathy and warrants increased efforts at glucose control, blood pressure control, and institution of therapy with an angiotensin-converting-enzyme (ACE) inhibitor (if the patient can tolerate it).       Cholesterol 07/20/2023 148  <200 mg/dL Final     Triglycerides 07/20/2023 40  <150  mg/dL Final     Direct Measure HDL 07/20/2023 69  >=40 mg/dL Final     LDL Cholesterol Calculated 07/20/2023 71  <=100 mg/dL Final     Non HDL Cholesterol 07/20/2023 79  <130 mg/dL Final     TSH 07/20/2023 0.79  0.30 - 4.20 uIU/mL Final     25 OH Vitamin D2 07/20/2023 <5  ug/L Final     25 OH Vitamin D3 07/20/2023 45  ug/L Final     25 OH Vit D Total 07/20/2023 <50  20 - 75 ug/L Final    Season, race, dietary intake, and treatment affect the concentration of 25-hydroxy-Vitamin D. Values may decrease during winter months and increase during summer months. Values 20-29 ug/L may indicate Vitamin D insufficiency and values <20 ug/L may indicate Vitamin D deficiency.     Assessment and Plan:  #1 Type I diabetes mellitus   Patient is now on a t:slim pump with control IQ.  Given his insulin requirements, I think he needs a little bit more correction factor.  Adjustments as noted below.  We will also switch him over to the Dexcom G7.  Referral made to diabetes education to help him with the Dexcom G7 switch in the setting of the t:slim pump.  Prescription for Dexcom G7 sent to [facility].    Pump settings as noted below:  Basal rate:  Midnight to 6 AM: 0.5 units/h  6 AM to 11 PM: 0.7 units/h  11 PM to midnight: 0.5 units/h    Correction factor:  Midnight to 6 AM: 1 per 45  6 AM to 10 AM: 1 per 35  10 AM to 11 pm: 1 per 40  11 PM to midnight: 1 per 45    Carb ratio:  Midnight to 6 AM: 1 per 12  6 AM to 11 PM: 1 per 9  9 PM to midnight: 1 per 12    Target glucose: 120    #2 hypertension  Continue lisinopril 15 mg daily-prescription sent to pharmacy.    #3 history of low vitamin D  Will recheck vitamin D with return visit.  July 2023 vitamin D was normal.    Return visit in 6 months and again in 1 year.  Labs below before return visit in 6 months.    Orders Placed This Encounter   Procedures     25 Hydroxyvitamin D2 and D3     TSH     Lipid Profile     Albumin Random Urine Quantitative with Creat Ratio      Basic metabolic panel     Hemoglobin A1c     Tissue transglutaminase rylee IgA and IgG     Adult Diabetes Education  Referral

## 2024-02-23 ENCOUNTER — VIRTUAL VISIT (OUTPATIENT)
Dept: ENDOCRINOLOGY | Facility: CLINIC | Age: 44
End: 2024-02-23
Payer: COMMERCIAL

## 2024-02-23 DIAGNOSIS — E10.9 CONTROLLED DIABETES MELLITUS TYPE 1 WITHOUT COMPLICATIONS (H): Primary | ICD-10-CM

## 2024-02-23 PROCEDURE — 99213 OFFICE O/P EST LOW 20 MIN: CPT | Mod: 95 | Performed by: INTERNAL MEDICINE

## 2024-02-23 RX ORDER — INSULIN ASPART 100 [IU]/ML
INJECTION, SOLUTION INTRAVENOUS; SUBCUTANEOUS
Qty: 50 ML | Refills: 3 | Status: SHIPPED | OUTPATIENT
Start: 2024-02-23 | End: 2024-07-19

## 2024-02-23 RX ORDER — ACYCLOVIR 400 MG/1
TABLET ORAL
Qty: 3 EACH | Refills: 11 | Status: SHIPPED | OUTPATIENT
Start: 2024-02-23 | End: 2024-07-19

## 2024-02-23 RX ORDER — INSULIN PUMP CARTRIDGE
1 CARTRIDGE (EA) SUBCUTANEOUS
Qty: 120 EACH | Refills: 5 | Status: SHIPPED | OUTPATIENT
Start: 2024-02-23

## 2024-02-23 RX ORDER — LISINOPRIL 5 MG/1
15 TABLET ORAL DAILY
Qty: 270 TABLET | Refills: 4 | Status: SHIPPED | OUTPATIENT
Start: 2024-02-23 | End: 2024-02-23

## 2024-02-23 RX ORDER — INSULIN INFUSION SET/CARTRIDGE
1 COMBINATION PACKAGE (EA) MISCELLANEOUS
Qty: 40 EACH | Refills: 1 | Status: SHIPPED | OUTPATIENT
Start: 2024-02-23

## 2024-02-23 RX ORDER — LISINOPRIL 5 MG/1
15 TABLET ORAL DAILY
Qty: 270 TABLET | Refills: 4 | Status: SHIPPED | OUTPATIENT
Start: 2024-02-23 | End: 2024-07-19

## 2024-02-23 NOTE — NURSING NOTE
Is the patient currently in the state of [place]? YES    Visit mode:VIDEO    If the visit is dropped, the patient can be reconnected by: VIDEO VISIT: Send to e-mail at: [email or web address]    Will anyone else be joining the visit? NO  (If patient encounters technical issues they should call [phone]    How would you like to obtain your AVS? MyChart    Are changes needed to the allergy or medication list? No patient reported making no changes to e-check information since completing e-check in for visit. VF did not review this information again with patient due to this.    Reason for visit: RECHECK    Graciela CHONG

## 2024-02-23 NOTE — LETTER
2/23/2024       RE: Joseph O Thoen  [address] [facility] [address]     Dear Colleague,    Thank you for referring your patient, Joseph O Thoen, to the [facility] [place] [facility]. Please see a copy of my visit note below.    Outcome for 02/12/24 8:24 AM: Data uploaded on [employer]  Saira Soto MA  Outcome for 02/21/24 8:49 AM: Data obtained via [employer] website  Liya Meade LPN                         Video-Visit Details    Type of service:  Video Visit    Virtual visit conducted by Jerald.      Originating Location (pt. Location) [facility]    Distant Location (provider location):  Off site    Mode of Communication:  Video Conference via [employer]    Physician has received verbal consent for a Video Visit from the patient? YES      Bushra Rosa MD    2/23/24    Chief Complaint:   Diabetes     History of Present Illness:   Joseph O Thoen is a 43 year old male with a history of type I diabetes mellitus who presents alone for follow-up of diabetes.    Start time 932, stop time 942, chart review, documentation time, coordination of care, 10 minutes.  Total time spent day of encounter 20 minutes.    #1 Type I diabetes mellitus   The patient was initially seen in May 2011 at which time he had a 30 pound weight loss with polyuria, polydipsia, serum glucose of 466, and Hemoglobin A1c of 13.5%.  His C-peptide was 0.8 with a glucose of 318.  DEMARCO antibodies were negative.  He was initially started on Metformin however his blood sugars did not improve therefore he was switched to Lantus and Novolog.  Blood work in September 2011 was significant for negative anti-islet and anti-insulin antibodies.  Since starting insulin, his diabetes has been well controlled with Hemoglobin A1c values all <7%.  He started on a Medtronic insulin pump in July 2012 and switched to the closed-loop system in January 2018.  The patient did not like the  Medtronic closed-loop system due to the alarms.  so he is on the Medtronic pump with the Dexcom G6 sensor (started in January 2021).  He reports that he is extremely happy with the Dexcom G6 sensor and feels that this has been useful.    October 2021 hemoglobin A1c of 5.9. The patient has been on the tandem t:slim pump with control IQ since roughly November 2021.  He is very satisfied with the program.  July 2022 hemoglobin A1c of 5.6.  July 2022 labs show negative urine for protein, negative TTG, hemoglobin A1c of 5.6, vitamin D of 42, and TSH of 0.72.   Interval history: Patient continues on lisinopril 15 mg daily.   He has reported some hyperglycemia in the evening.      Blood Glucose Monitoring:  Glucose 132, 80% within range, 15% above range,     Insulin program:  Average daily dose of 48 units, 31% (15 units) basal, 69% (33 units) bolus    Pump settings as noted below:  Basal rate:  Midnight to 6 AM: 0.5 units/h  6 AM to 11 PM: 0.7 units/h  11 PM to midnight: 0.5 units/h    Correction factor:  Midnight to 6 AM: 1 per 60  6 AM to 11 PM: 1 per 40  11 PM to midnight: 1 per 60    Carb ratio:  Midnight to 6 AM: 1 per 12  6 AM to 11 PM: 1 per 9  9 PM to midnight: 1 per 12    Target glucose: 120    Diabetes monitoring and complications:  CAD: July 2023 LDL 71  Last eye exam results: Negative in September 2023  Microalbuminuria: Negative in July 2023, on lisinopril  Neuropathy: No  HTN: Yes, on Lisinopril   On Statin: No  On Aspirin: No  Depression: No  Erectile dysfunction: No     #2 Low vitamin D  There is a history of low vitamin D.   July 2022 vitamin D was normal at 42.    Interval history: July 2023 vitamin D was 50    Review of Systems:   Pertinent items are noted in HPI.  All other systems are negative.    Active Medications:   Current Outpatient Medications   Medication Sig Dispense Refill    acetone, Urine, test STRP 1 each by In Vitro route as needed. 50 strip 3    blood glucose (STAS CONTOUR) test strip  "strips work with meter / pump. Test 6 times daily  Tosha contour  NEXT strips 600 strip 3    Blood Pressure Monitor KIT Take bp 2 times a week or PRN problems.  Dx DM 1 kit 0    Continuous Blood Gluc Sensor (DEXCOM G6 SENSOR) MISC Change every 10 days. 9 each 3    Continuous Blood Gluc Transmit (DEXCOM G6 TRANSMITTER) MISC Change every 3 months. 1 each 3    Glucagon (BAQSIMI TWO PACK) 3 MG/DOSE POWD Spray 1 Application in nostril as needed (as needed) Cost checking only 1 each 1    insulin aspart (NOVOLOG VIAL) 100 UNITS/ML vial Use in pump approx 50 units daily 50 mL 3    insulin cartridge (T:SLIM 3ML) misc pump supply Insulin cartridge to be used with pump as directed.  Change every 2-3 days or as directed. 40 each 1    insulin glargine (LANTUS PEN) 100 UNIT/ML pen Pt to be on lantus 15 units daily if pump fails 3 mL 0    Insulin Infusion Pump Supplies (AUTOSOFT XC INFUSION SET) MISC 1 each every 3 days Autosoft XC 9 mm cannula, 23 inch tubing. 40 each 1    lisinopril (ZESTRIL) 5 MG tablet Take 3 tablets (15 mg) by mouth daily Take 3 tablet (15 mg) daily. 270 tablet 4    MICROLET LANCETS MISC 1 each 6 times daily 200 each 11    valACYclovir (VALTREX) 1000 mg tablet Take 1 tablet (1,000 mg) by mouth 3 times daily for 7 days 21 tablet 0     Allergies:   Benadryl [altaryl]      Past Medical History:  Type I diabetes mellitus   Hypertension      Past Surgical History:  History reviewed. No pertinent past surgical history.      Family History:   Thyroid disease - mother  Hypertension - mother      Social History:   The patient works at the [employer].     Physical Exam:   GENERAL: Healthy, alert and no distress\",\"EYES: Eyes grossly normal to inspection.  No discharge or erythema, or obvious scleral/conjunctival abnormalities.\",\"RESP: No audible wheeze, cough, or visible cyanosis.  No visible retractions or increased work of breathing.  \",\"SKIN: Visible skin clear. No significant rash, abnormal pigmentation or " "lesions.\",\"NEURO: Cranial nerves grossly intact.  Mentation and speech appropriate for age.\",\"PSYCH: Mentation appears normal, affect normal/bright, judgement and insight intact, normal speech and appearance well-groomed.    Data:  Office Visit on 07/21/2023   Component Date Value Ref Range Status    Hemoglobin A1C POCT 07/21/2023 5.8  4.3 - <5.7 % Final   Lab on 07/20/2023   Component Date Value Ref Range Status    Hemoglobin A1C 07/20/2023 5.8 (H)  <5.7 % Final    Normal <5.7%   Prediabetes 5.7-6.4%    Diabetes 6.5% or higher     Note: Adopted from ADA consensus guidelines.    Sodium 07/20/2023 141  136 - 145 mmol/L Final    Potassium 07/20/2023 3.9  3.4 - 5.3 mmol/L Final    Chloride 07/20/2023 103  98 - 107 mmol/L Final    Carbon Dioxide (CO2) 07/20/2023 28  22 - 29 mmol/L Final    Anion Gap 07/20/2023 10  7 - 15 mmol/L Final    Urea Nitrogen 07/20/2023 8.0  6.0 - 20.0 mg/dL Final    Creatinine 07/20/2023 0.73  0.67 - 1.17 mg/dL Final    Calcium 07/20/2023 9.5  8.6 - 10.0 mg/dL Final    Glucose 07/20/2023 113 (H)  70 - 99 mg/dL Final    GFR Estimate 07/20/2023 >90  >60 mL/min/1.73m2 Final    Creatinine Urine mg/dL 07/20/2023 58.3  mg/dL Final    The reference ranges have not been established in urine creatinine. The results should be integrated into the clinical context for interpretation.    Albumin Urine mg/L 07/20/2023 <12.0  mg/L Final    The reference ranges have not been established in urine albumin. The results should be integrated into the clinical context for interpretation.    Albumin Urine mg/g Cr 07/20/2023    Final    Unable to calculate, urine albumin and/or urine creatinine is outside detectable limits.  Microalbuminuria is defined as an albumin:creatinine ratio of 17 to 299 for males and 25 to 299 for females. A ratio of albumin:creatinine of 300 or higher is indicative of overt proteinuria.  Due to biologic variability, positive results should be confirmed by a second, first-morning random or " 24-hour timed urine specimen. If there is discrepancy, a third specimen is recommended. When 2 out of 3 results are in the microalbuminuria range, this is evidence for incipient nephropathy and warrants increased efforts at glucose control, blood pressure control, and institution of therapy with an angiotensin-converting-enzyme (ACE) inhibitor (if the patient can tolerate it).      Cholesterol 07/20/2023 148  <200 mg/dL Final    Triglycerides 07/20/2023 40  <150 mg/dL Final    Direct Measure HDL 07/20/2023 69  >=40 mg/dL Final    LDL Cholesterol Calculated 07/20/2023 71  <=100 mg/dL Final    Non HDL Cholesterol 07/20/2023 79  <130 mg/dL Final    TSH 07/20/2023 0.79  0.30 - 4.20 uIU/mL Final    25 OH Vitamin D2 07/20/2023 <5  ug/L Final    25 OH Vitamin D3 07/20/2023 45  ug/L Final    25 OH Vit D Total 07/20/2023 <50  20 - 75 ug/L Final    Season, race, dietary intake, and treatment affect the concentration of 25-hydroxy-Vitamin D. Values may decrease during winter months and increase during summer months. Values 20-29 ug/L may indicate Vitamin D insufficiency and values <20 ug/L may indicate Vitamin D deficiency.     Assessment and Plan:  #1 Type I diabetes mellitus   Patient is now on a t:slim pump with control IQ.  Given his insulin requirements, I think he needs a little bit more correction factor.  Adjustments as noted below.  We will also switch him over to the Dexcom G7.  Referral made to diabetes education to help him with the Dexcom G7 switch in the setting of the t:slim pump.  Prescription for Dexcom G7 sent to [place] [facility].    Pump settings as noted below:  Basal rate:  Midnight to 6 AM: 0.5 units/h  6 AM to 11 PM: 0.7 units/h  11 PM to midnight: 0.5 units/h    Correction factor:  Midnight to 6 AM: 1 per 45  6 AM to 10 AM: 1 per 35  10 AM to 11 pm: 1 per 40  11 PM to midnight: 1 per 45    Carb ratio:  Midnight to 6 AM: 1 per 12  6 AM to 11 PM: 1 per 9  9 PM to midnight: 1 per 12    Target  glucose: 120    #2 hypertension  Continue lisinopril 15 mg daily-prescription sent to pharmacy.    #3 history of low vitamin D  Will recheck vitamin D with return visit.  July 2023 vitamin D was normal.    Return visit in 6 months and again in 1 year.  Labs below before return visit in 6 months.    Orders Placed This Encounter   Procedures    25 Hydroxyvitamin D2 and D3    TSH    Lipid Profile    Albumin Random Urine Quantitative with Creat Ratio    Basic metabolic panel    Hemoglobin A1c    Tissue transglutaminase rylee IgA and IgG    Adult Diabetes Education  Referral     Bushra Rosa MD

## 2024-03-04 ENCOUNTER — TELEPHONE (OUTPATIENT)
Dept: ENDOCRINOLOGY | Facility: CLINIC | Age: 44
End: 2024-03-04
Payer: COMMERCIAL

## 2024-03-15 ENCOUNTER — VIRTUAL VISIT (OUTPATIENT)
Dept: EDUCATION SERVICES | Facility: CLINIC | Age: 44
End: 2024-03-15
Attending: INTERNAL MEDICINE
Payer: COMMERCIAL

## 2024-03-15 DIAGNOSIS — E10.9 CONTROLLED DIABETES MELLITUS TYPE 1 WITHOUT COMPLICATIONS (H): ICD-10-CM

## 2024-03-15 PROCEDURE — 98966 PH1 ASSMT&MGMT NQHP 5-10: CPT | Mod: 95 | Performed by: DIETITIAN, REGISTERED

## 2024-03-15 NOTE — PROGRESS NOTES
stanley Education Follow-up    Subjective/Objective:  Type of service:  Video Visit  Originating Location (pt. Location): Home  Distant Location (provider location): [facility]ABET[facility]UC[facility] [place]  Mode of Communication:  Video Conference via [employer]  Joined the call at 3/15/2024, 9:31:53 am.  Left the call at 3/15/2024, 10:00:33 am.  You were on the call for 28 minutes 39 seconds .  How would patient like to obtain AVS? MyChart    Diabetes is being managed with Lifestyle (diet/activity), Diabetes Medications   Diabetes Medication(s)       Diabetic Other       Glucagon (BAQSIMI TWO PACK) 3 MG/DOSE POWD Spray 1 Application in nostril as needed (as needed) Cost checking only       Insulin       insulin aspart (NOVOLOG VIAL) 100 UNITS/ML vial Use in pump approx 50 units daily     insulin glargine (LANTUS PEN) 100 UNIT/ML pen Pt to be on lantus 15 units daily if pump fails            Assessment:  Upgrading TSlim pump from G6 to G7.  Has done the software upgrade.  Attempted to put Clarity on phone, however dexcom had a tech glitch and would not allow this.  Clarified from co-worker that you can run the dexcom solely through the pump.  Reviewed use and differences with G7.   Pump and dexcom and insulin through [facility]     Plan/Response:  Follow up on mychart as needed     Jodi Lozano RD, LD, [facility]  Diabetes Education  Time spent: 10 minutes education (other time was trouble shooting - not DSME)

## 2024-03-15 NOTE — Clinical Note
"    3/15/2024         RE: Joseph O Thoen  [address] [facility] [address]        Dear Colleague,    Thank you for referring your patient, Joseph O Thoen, to the [facility] [place]. Please see a copy of my visit note below.        Lab Results   Component Value Date    A1C 5.8 07/20/2023    A1C 5.9 10/08/2021    A1C 5.9 08/28/2013    A1C 6.1 01/18/2013    A1C 5.5 09/14/2012    A1C 4.9 03/02/2012    A1C 5.2 11/03/2011         Wt Readings from Last 5 Encounters:   07/21/23 73 kg (161 lb)   07/15/22 69.6 kg (153 lb 6.4 oz)   10/25/21 71.6 kg (157 lb 12.8 oz)   03/20/21 65.8 kg (145 lb)   03/16/21 65.8 kg (145 lb)       Lab Results   Component Value Date    A1C 5.8 07/20/2023    A1C 5.9 10/08/2021    A1C 5.9 08/28/2013    A1C 6.1 01/18/2013    A1C 5.5 09/14/2012    A1C 4.9 03/02/2012    A1C 5.2 11/03/2011       Pump and dexcom and insulin through [facility]   Joined the call at 3/15/2024, 9:31:53 am.  Left the call at 3/15/2024, 10:00:33 am.  You were on the call for 28 minutes 39 seconds .    10 mins ed visit (    INTERVENTION:   ***                Diabetes Education Follow-up    Subjective/Objective:  Type of service:  Video Visit  Originating Location (pt. Location): {video visit patient location:993608::\"Home\"}  Distant Location (provider location): {RD location:810225}  Mode of Communication:  Video Conference via [employer]  Joined the call at 3/15/2024, 9:31:53 am.  Left the call at 3/15/2024, 10:00:33 am.  You were on the call for 28 minutes 39 seconds .  How would patient like to obtain AVS? MyChart    Diabetes is being managed with Lifestyle (diet/activity), Diabetes Medications   Diabetes Medication(s)       Diabetic Other       Glucagon (BAQSIMI TWO PACK) 3 MG/DOSE POWD Spray 1 Application in nostril as needed (as needed) Cost checking only       Insulin       insulin aspart (NOVOLOG VIAL) 100 UNITS/ML vial Use in pump approx 50 units daily     insulin glargine " (LANTUS PEN) 100 UNIT/ML pen Pt to be on lantus 15 units daily if pump fails            Assessment:  Upgrading TSlim pump from G6 to G7.  Has done the software upgrade.  Attempted to put Clarity on phone, however dexcom had a tech glitch and would not allow this.  Clarified from co-worker that you can run the dexcom solely through the pump.  Reviewed use and differences with G7.   Pump and dexcom and insulin through [facility]     Plan/Response:  Follow up on mychart as needed     Jodi Lozano RD, LD, [facility]  Diabetes Education  Time spent: 10 minutes education (other time was trouble shooting - not DSME)

## 2024-03-26 ENCOUNTER — OFFICE VISIT (OUTPATIENT)
Dept: FAMILY MEDICINE | Facility: CLINIC | Age: 44
End: 2024-03-26
Payer: COMMERCIAL

## 2024-03-26 VITALS
HEART RATE: 57 BPM | OXYGEN SATURATION: 100 % | TEMPERATURE: 97.8 F | SYSTOLIC BLOOD PRESSURE: 152 MMHG | WEIGHT: 163.5 LBS | BODY MASS INDEX: 24.22 KG/M2 | HEIGHT: 69 IN | RESPIRATION RATE: 14 BRPM | DIASTOLIC BLOOD PRESSURE: 82 MMHG

## 2024-03-26 DIAGNOSIS — Z00.00 ENCOUNTER FOR ANNUAL PHYSICAL EXAM: Primary | ICD-10-CM

## 2024-03-26 DIAGNOSIS — Z12.11 SCREEN FOR COLON CANCER: ICD-10-CM

## 2024-03-26 DIAGNOSIS — Z23 ENCOUNTER FOR IMMUNIZATION: ICD-10-CM

## 2024-03-26 PROCEDURE — 91320 SARSCV2 VAC 30MCG TRS-SUC IM: CPT

## 2024-03-26 PROCEDURE — 90686 IIV4 VACC NO PRSV 0.5 ML IM: CPT

## 2024-03-26 PROCEDURE — 99396 PREV VISIT EST AGE 40-64: CPT | Mod: 25

## 2024-03-26 PROCEDURE — 90472 IMMUNIZATION ADMIN EACH ADD: CPT

## 2024-03-26 PROCEDURE — 90471 IMMUNIZATION ADMIN: CPT

## 2024-03-26 PROCEDURE — 90480 ADMN SARSCOV2 VAC 1/ONLY CMP: CPT

## 2024-03-26 PROCEDURE — 90677 PCV20 VACCINE IM: CPT

## 2024-03-26 SDOH — HEALTH STABILITY: PHYSICAL HEALTH: ON AVERAGE, HOW MANY DAYS PER WEEK DO YOU ENGAGE IN MODERATE TO STRENUOUS EXERCISE (LIKE A BRISK WALK)?: 2 DAYS

## 2024-03-26 ASSESSMENT — PAIN SCALES - GENERAL: PAINLEVEL: NO PAIN (0)

## 2024-03-26 ASSESSMENT — SOCIAL DETERMINANTS OF HEALTH (SDOH): HOW OFTEN DO YOU GET TOGETHER WITH FRIENDS OR RELATIVES?: ONCE A WEEK

## 2024-03-26 NOTE — PROGRESS NOTES
Preventive Care Visit  [facility]  Mo Madrigal NP, Nurse Practitioner Primary Care  Mar 26, 2024      Assessment & Plan     Screen for colon cancer  - Colonoscopy Screening  Referral; Future    Encounter for immunization  - Pneumococcal 20 Valent Conjugate (Prevnar 20)  - INFLUENZA VACCINE IM > 6 MONTHS VALENT IIV4 (AFLURIA/FLUZONE)  - COVID-19 12+ (2023-24) (PFIZER)    Encounter for annual physical exam  Discussed goal of getting back into exercise routine.    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 Monogamous.    Diet:Vegetarian. Does eggs. Does carb counting.  Exercise: less now with young child.   Sleep: Variable. Has young children.    Patient run out of blood pressure medicines and just started re-taking this AM. Takes 15 mg lisinopril.          3/26/2024     9:07 AM   Additional Questions   Roomed by SOURAV   Accompanied by SELF        Health Care Directive  Patient does not have a Health Care Directive or Living Will: Not discussed at today's visit.    HPI        3/26/2024   General Health   How would you rate your overall physical health? Good   Feel stress (tense, anxious, or unable to sleep) Only a little   (!) STRESS CONCERN      3/26/2024   Nutrition   Three or more servings of calcium each day? Yes   Diet: Vegetarian/vegan   How many servings of fruit and vegetables per day? (!) 2-3   How many sweetened beverages each day? 0-1         3/26/2024   Exercise   Days per week of moderate/strenous exercise 2 days   (!) EXERCISE CONCERN      3/26/2024   Social Factors   Frequency of gathering with friends or relatives Once a week   Worry food won't last until get money to buy more No   Food not last or not have enough money for food? No   Do you have housing?  Yes   Are you worried about losing your housing? No  "  Lack of transportation? No   Unable to get utilities (heat,electricity)? No         3/26/2024   Dental   Dentist two times every year?  - Every 3 months Yes   Eye- goes once per year.        3/26/2024   TB Screening   Were you born outside of the [place]? No         Today's PHQ-2 Score:       3/26/2024     8:51 AM   PHQ-2 ( 1999 Pfizer)   Q1: Little interest or pleasure in doing things 0   Q2: Feeling down, depressed or hopeless 0   PHQ-2 Score 0   Q1: Little interest or pleasure in doing things Not at all   Q2: Feeling down, depressed or hopeless Not at all   PHQ-2 Score 0           3/26/2024   Substance Use   Alcohol more than 3/day or more than 7/wk No   Do you use any other substances recreationally? No     Social History     Tobacco Use    Smoking status: Never    Smokeless tobacco: Never   Substance Use Topics    Alcohol use: Yes     Alcohol/week: 1.0 standard drink of alcohol     Types: 1 Standard drinks or equivalent per week     Comment: 1 glass of wine if rough day with good. About 5 glasses per week.    Drug use: No             3/26/2024   One time HIV Screening   Previous HIV test? No         3/26/2024   STI Screening   New sexual partner(s) since last STI/HIV test? No   ASCVD Risk   The 10-year ASCVD risk score (German DK, et al., 2019) is: 2.1%    Values used to calculate the score:      Age: 44 years      Sex: Male      Is Non- : No      Diabetic: Yes      Tobacco smoker: No      Systolic Blood Pressure: 152 mmHg      Is BP treated: No      HDL Cholesterol: 69 mg/dL      Total Cholesterol: 148 mg/dL        3/26/2024   Contraception/Family Planning   Questions about contraception or family planning No        Reviewed and updated as needed this visit by Provider   Tobacco      Surg Hx                      Objective    Exam  BP (!) 152/82   Pulse 57   Temp 97.8  F (36.6  C) (Temporal)   Resp 14   Ht 1.76 m (5' 9.29\")   Wt 74.2 kg (163 lb 8 oz)   SpO2 100%   BMI " "23.94 kg/m     Estimated body mass index is 23.94 kg/m  as calculated from the following:    Height as of this encounter: 1.76 m (5' 9.29\").    Weight as of this encounter: 74.2 kg (163 lb 8 oz).    Physical Exam  GENERAL: alert and no distress  EYES: Eyes grossly normal to inspection, PERRL and conjunctivae and sclerae normal  HENT: ear canals and TM's normal, nose and mouth without ulcers or lesions  NECK: no adenopathy, no asymmetry, masses, or scars  RESP: lungs clear to auscultation - no rales, rhonchi or wheezes  CV: regular rate and rhythm, normal S1 S2, no S3 or S4, no murmur, click or rub, no peripheral edema  ABDOMEN: soft, nontender, no hepatosplenomegaly, no masses and bowel sounds normal  MS: no gross musculoskeletal defects noted, no edema  SKIN: no suspicious lesions or rashes  NEURO: Normal strength and tone, mentation intact and speech normal  PSYCH: mentation appears normal, affect normal/bright        Signed Electronically by: Mo Madrigal NP    "

## 2024-05-13 ENCOUNTER — TELEPHONE (OUTPATIENT)
Dept: GASTROENTEROLOGY | Facility: CLINIC | Age: 44
End: 2024-05-13
Payer: COMMERCIAL

## 2024-05-13 DIAGNOSIS — Z12.11 SPECIAL SCREENING FOR MALIGNANT NEOPLASMS, COLON: Primary | ICD-10-CM

## 2024-05-13 DIAGNOSIS — Z12.11 ENCOUNTER FOR SCREENING COLONOSCOPY: Primary | ICD-10-CM

## 2024-05-13 RX ORDER — BISACODYL 5 MG/1
TABLET, DELAYED RELEASE ORAL
Qty: 4 TABLET | Refills: 0 | Status: SHIPPED | OUTPATIENT
Start: 2024-05-13 | End: 2024-07-19

## 2024-05-13 NOTE — TELEPHONE ENCOUNTER
Attempted to contact patient in order to complete pre assessment questions.     No answer. Left message to return call to [phone] option 4    Callback required communication sent via [employer].    Briseida Acuna RN  Endoscopy Procedure Pre Assessment RN

## 2024-05-13 NOTE — TELEPHONE ENCOUNTER
"Endoscopy Scheduling Screen    Have you had a positive Covid test in the last 14 days?  No    What is your communication preference for Instructions and/or Bowel Prep?   MyChart    What insurance is in the chart?  Other:  [employer]    Ordering/Referring Provider:   ROMINA KOTHARI        (If ordering provider performs procedure, schedule with ordering provider unless otherwise instructed. )    BMI: Estimated body mass index is 23.94 kg/m  as calculated from the following:    Height as of 3/26/24: 1.76 m (5' 9.29\").    Weight as of 3/26/24: 74.2 kg (163 lb 8 oz).     Sedation Ordered  moderate sedation.   If patient BMI > 50 do not schedule in [facility].    If patient BMI > 45 do not schedule at [facility].    Are you taking methadone or Suboxone?  No    Have you had difficulties, pain, or discomfort during past endoscopy procedures?  No    Are you taking any prescription medications for pain 3 or more times per week?   NO, No RN review required.    Do you have a history of malignant hyperthermia?  No    (Females) Are you currently pregnant?   No     Have you been diagnosed or told you have pulmonary hypertension?   No    Do you have an LVAD?  No    Have you been told you have moderate to severe sleep apnea?  No    Have you been told you have COPD, asthma, or any other lung disease?  No    Do you have any heart conditions?  No     Have you ever had or are you waiting for an organ transplant?  No. Continue scheduling, no site restrictions.    Have you had a stroke or transient ischemic attack (TIA aka \"mini stroke\" in the last 6 months?   No    Have you been diagnosed with or been told you have cirrhosis of the liver?   No    Are you currently on dialysis?   No    Do you need assistance transferring?   No    BMI: Estimated body mass index is 23.94 kg/m  as calculated from the following:    Height as of 3/26/24: 1.76 m (5' 9.29\").    Weight as of 3/26/24: 74.2 kg (163 lb 8 oz).     Is patients BMI > 40 and scheduling location " [facility]?  No    Do you take an injectable medication for weight loss or diabetes (excluding insulin)?  No    Do you take the medication Naltrexone?  No    Do you take blood thinners?  No       Prep   Are you currently on dialysis or do you have chronic kidney disease?  No    Do you have a diagnosis of diabetes?  Yes (Golytely Prep)    Do you have a diagnosis of cystic fibrosis (CF)?  No    On a regular basis do you go 3 -5 days between bowel movements?  No    BMI > 40?  No    Preferred Pharmacy:    [facility] [address]  [address]  [facility] [address]  Phone: [phone] Fax: [phone] Alternate Fax: [phone], [phone]                  Final Scheduling Details     Procedure scheduled  Colonoscopy    Surgeon:  Vanessa     Date of procedure:  5/28     Pre-OP / PAC:   No - Not required for this site.    Location  [facility] - Patient preference.    Sedation   Moderate Sedation - Per order.      Patient Reminders:   You will receive a call from a Nurse to review instructions and health history.  This assessment must be completed prior to your procedure.  Failure to complete the Nurse assessment may result in the procedure being cancelled.      On the day of your procedure, please designate an adult(s) who can drive you home stay with you for the next 24 hours. The medicines used in the exam will make you sleepy. You will not be able to drive.      You cannot take public transportation, ride share services, or non-medical taxi service without a responsible caregiver.  Medical transport services are allowed with the requirement that a responsible caregiver will receive you at your destination.  We require that drivers and caregivers are confirmed prior to your procedure.

## 2024-05-13 NOTE — TELEPHONE ENCOUNTER
Pre assessment completed for upcoming procedure.   (Please see previous telephone encounter notes for complete details)    Patient  returned call.       Procedure details:    Arrival time and facility location reviewed.    Pre op exam needed? N/A    Designated  policy reviewed. Instructed to have someone stay 6  hours post procedure.       Medication review:    Medications reviewed. Please see supporting documentation below. Holding recommendations discussed (if applicable).   Insulin.  Consult with your managing provider.       Prep for procedure:     Procedure prep instructions reviewed.        Any additional information needed:  N/A      Patient  verbalized understanding and had no questions or concerns at this time.      Radha Sosa RN  Endoscopy Procedure Pre Assessment RN  [phone] option 4

## 2024-05-13 NOTE — TELEPHONE ENCOUNTER
Pre visit planning completed.      Procedure details:    Patient scheduled for Colonoscopy  on 5/28/2024.     Arrival time: 0745. Procedure time 0845    Facility location: [facility]; [address], [address], [place] [address]. Check in location: Main entrance at registration desk.    Sedation type: Conscious sedation     Pre op exam needed? N/A    Indication for procedure: Screen for colon cancer [Z12.11]       Chart review:     Electronic implanted devices? No    Recent diagnosis of diverticulitis within the last 6 weeks? No    Diabetic? Yes. Diabetic medication HOLDING recommendations: Insulin: Yes. Consult with managing provider       Medication review:    Anticoagulants? No    NSAIDS? No NSAID medications per patient's medication list.  RN will verify with pre-assessment call.    Other medication HOLDING recommendations:  N/A      Prep for procedure:     Bowel prep recommendation: Standard Golytely. Bowel prep prescription sent to    [place], [place] -  [facility] [id number]  Due to: diabetes.     Prep instructions sent via mayra Acuna RN  Endoscopy Procedure Pre Assessment RN  [phone] option 4

## 2024-05-15 NOTE — TELEPHONE ENCOUNTER
Caller: Joseph O Thoen     Reason for Reschedule/Cancellation   (please be detailed, any staff messages or encounters to note?): transportation conflict      Prior to reschedule please review:  Ordering Provider: Kaitlin  Sedation Determined: moderate  Does patient have any ASC Exclusions, please identify?: n      Notes on Cancelled Procedure:  Procedure: Lower Endoscopy [Colonoscopy]   Date: 5/28  Location: [facility]; [address], [place] [address]   Surgeon: Tevin      Rescheduled: Yes,   Procedure: Lower Endoscopy [Colonoscopy]    Date: 9/4/24   Location: [facility]; [address], [address], [place] [address]    Surgeon: Adams   Sedation Level Scheduled  moderate ,  Reason for Sedation Level ordered   Instructions updated and sent: y     Does patient need PAC or Pre -Op Rescheduled? : no       Did you cancel or rescheduled an E[facility] procedure? No.

## 2024-06-19 NOTE — PROGRESS NOTES
06/19/24 11:09 AM  PATIENT LAB/IMAGING STATUS : MyChart sent to patient to complete standing/future orders     Adeline Levine CMA

## 2024-06-20 DIAGNOSIS — E10.9 CONTROLLED DIABETES MELLITUS TYPE 1 WITHOUT COMPLICATIONS (H): Primary | ICD-10-CM

## 2024-06-20 RX ORDER — INSULIN ASPART 100 [IU]/ML
INJECTION, SOLUTION INTRAVENOUS; SUBCUTANEOUS
Qty: 15 ML | Refills: 2 | Status: SHIPPED | OUTPATIENT
Start: 2024-06-20 | End: 2024-07-19

## 2024-07-12 ENCOUNTER — LAB (OUTPATIENT)
Dept: LAB | Facility: CLINIC | Age: 44
End: 2024-07-12
Payer: COMMERCIAL

## 2024-07-12 DIAGNOSIS — E10.9 CONTROLLED DIABETES MELLITUS TYPE 1 WITHOUT COMPLICATIONS (H): ICD-10-CM

## 2024-07-12 LAB
ANION GAP SERPL CALCULATED.3IONS-SCNC: 9 MMOL/L (ref 7–15)
BUN SERPL-MCNC: 9.9 MG/DL (ref 6–20)
CALCIUM SERPL-MCNC: 9.4 MG/DL (ref 8.6–10)
CHLORIDE SERPL-SCNC: 101 MMOL/L (ref 98–107)
CHOLEST SERPL-MCNC: 125 MG/DL
CREAT SERPL-MCNC: 0.66 MG/DL (ref 0.67–1.17)
CREAT UR-MCNC: 27.7 MG/DL
DEPRECATED HCO3 PLAS-SCNC: 26 MMOL/L (ref 22–29)
EGFRCR SERPLBLD CKD-EPI 2021: >90 ML/MIN/1.73M2
FASTING STATUS PATIENT QL REPORTED: NO
FASTING STATUS PATIENT QL REPORTED: NO
GLUCOSE SERPL-MCNC: 171 MG/DL (ref 70–99)
HBA1C MFR BLD: 5.9 %
HDLC SERPL-MCNC: 57 MG/DL
LDLC SERPL CALC-MCNC: 57 MG/DL
MICROALBUMIN UR-MCNC: <12 MG/L
MICROALBUMIN/CREAT UR: NORMAL MG/G{CREAT}
NONHDLC SERPL-MCNC: 68 MG/DL
POTASSIUM SERPL-SCNC: 4.5 MMOL/L (ref 3.4–5.3)
SODIUM SERPL-SCNC: 136 MMOL/L (ref 135–145)
TRIGL SERPL-MCNC: 53 MG/DL
TSH SERPL DL<=0.005 MIU/L-ACNC: 0.83 UIU/ML (ref 0.3–4.2)

## 2024-07-12 PROCEDURE — 86364 TISS TRNSGLTMNASE EA IG CLAS: CPT | Performed by: INTERNAL MEDICINE

## 2024-07-12 PROCEDURE — 80048 BASIC METABOLIC PNL TOTAL CA: CPT | Performed by: PATHOLOGY

## 2024-07-12 PROCEDURE — 82043 UR ALBUMIN QUANTITATIVE: CPT | Performed by: INTERNAL MEDICINE

## 2024-07-12 PROCEDURE — 84443 ASSAY THYROID STIM HORMONE: CPT | Performed by: PATHOLOGY

## 2024-07-12 PROCEDURE — 36415 COLL VENOUS BLD VENIPUNCTURE: CPT | Performed by: PATHOLOGY

## 2024-07-12 PROCEDURE — 80061 LIPID PANEL: CPT | Performed by: PATHOLOGY

## 2024-07-12 PROCEDURE — 83036 HEMOGLOBIN GLYCOSYLATED A1C: CPT | Performed by: INTERNAL MEDICINE

## 2024-07-12 PROCEDURE — 82306 VITAMIN D 25 HYDROXY: CPT | Performed by: INTERNAL MEDICINE

## 2024-07-12 PROCEDURE — 99000 SPECIMEN HANDLING OFFICE-LAB: CPT | Performed by: PATHOLOGY

## 2024-07-15 LAB
TTG IGA SER-ACNC: 0.5 U/ML
TTG IGG SER-ACNC: 0.8 U/ML

## 2024-07-17 ENCOUNTER — TELEPHONE (OUTPATIENT)
Dept: ENDOCRINOLOGY | Facility: CLINIC | Age: 44
End: 2024-07-17
Payer: COMMERCIAL

## 2024-07-17 LAB
DEPRECATED CALCIDIOL+CALCIFEROL SERPL-MC: <45 UG/L (ref 20–75)
VITAMIN D2 SERPL-MCNC: <5 UG/L
VITAMIN D3 SERPL-MCNC: 40 UG/L

## 2024-07-17 NOTE — TELEPHONE ENCOUNTER
Pt has upcoming appt    -  Dear Yung    Here are your labs which we will review at your return visit.  Overall looks good.    If you have any questions, please feel free to contact my nurse at [phone] select option #3 for triage nurse  or  option #1 for scheduling related questions.    Regards    Bushra Rosa MD     Lab on 07/12/2024   Component Date Value Ref Range Status    Hemoglobin A1C 07/12/2024 5.9 (H)  <5.7 % Final    Normal <5.7%   Prediabetes 5.7-6.4%    Diabetes 6.5% or higher     Note: Adopted from ADA consensus guidelines.    25 OH Vitamin D2 07/12/2024 <5  ug/L Final    25 OH Vitamin D3 07/12/2024 40  ug/L Final    25 OH Vit D Total 07/12/2024 <45  20 - 75 ug/L Final    Season, race, dietary intake, and treatment affect the concentration of 25-hydroxy-Vitamin D. Values may decrease during winter months and increase during summer months. Values 20-29 ug/L may indicate Vitamin D insufficiency and values <20 ug/L may indicate Vitamin D deficiency.    TSH 07/12/2024 0.83  0.30 - 4.20 uIU/mL Final    Cholesterol 07/12/2024 125  <200 mg/dL Final    Triglycerides 07/12/2024 53  <150 mg/dL Final    Direct Measure HDL 07/12/2024 57  >=40 mg/dL Final    LDL Cholesterol Calculated 07/12/2024 57  <=100 mg/dL Final    Non HDL Cholesterol 07/12/2024 68  <130 mg/dL Final    Patient Fasting > 8hrs? 07/12/2024 No   Final    Creatinine Urine mg/dL 07/12/2024 27.7  mg/dL Final    The reference ranges have not been established in urine creatinine. The results should be integrated into the clinical context for interpretation.    Albumin Urine mg/L 07/12/2024 <12.0  mg/L Final    The reference ranges have not been established in urine albumin. The results should be integrated into the clinical context for interpretation.    Albumin Urine mg/g Cr 07/12/2024    Final    Unable to calculate, urine albumin and/or urine creatinine is outside detectable limits.  Microalbuminuria is defined as an albumin:creatinine ratio of 17 to  299 for males and 25 to 299 for females. A ratio of albumin:creatinine of 300 or higher is indicative of overt proteinuria.  Due to biologic variability, positive results should be confirmed by a second, first-morning random or 24-hour timed urine specimen. If there is discrepancy, a third specimen is recommended. When 2 out of 3 results are in the microalbuminuria range, this is evidence for incipient nephropathy and warrants increased efforts at glucose control, blood pressure control, and institution of therapy with an angiotensin-converting-enzyme (ACE) inhibitor (if the patient can tolerate it).      Sodium 07/12/2024 136  135 - 145 mmol/L Final    Potassium 07/12/2024 4.5  3.4 - 5.3 mmol/L Final    Chloride 07/12/2024 101  98 - 107 mmol/L Final    Carbon Dioxide (CO2) 07/12/2024 26  22 - 29 mmol/L Final    Anion Gap 07/12/2024 9  7 - 15 mmol/L Final    Urea Nitrogen 07/12/2024 9.9  6.0 - 20.0 mg/dL Final    Creatinine 07/12/2024 0.66 (L)  0.67 - 1.17 mg/dL Final    GFR Estimate 07/12/2024 >90  >60 mL/min/1.73m2 Final    eGFR calculated using 2021 CKD-EPI equation.    Calcium 07/12/2024 9.4  8.6 - 10.0 mg/dL Final    Glucose 07/12/2024 171 (H)  70 - 99 mg/dL Final    Patient Fasting > 8hrs? 07/12/2024 No   Final    Tissue Transglutaminase Antibody I* 07/12/2024 0.5  <7.0 U/mL Final    Negative- The tTG-IgA assay has limited utility for patients with decreased levels of IgA. Screening for celiac disease should include IgA testing to rule out selective IgA deficiency and to guide selection and interpretation of serological testing. tTG-IgG testing may be positive in celiac disease patients with IgA deficiency.    Tissue Transglutaminase Antibody I* 07/12/2024 0.8  <7.0 U/mL Final    Negative

## 2024-07-18 NOTE — PROGRESS NOTES
INPERSON VISIT    7/19/24      Chief Complaint:   Diabetes     History of Present Illness:   Joseph O Thoen is a 44 year old male with a history of type I diabetes mellitus who presents alone for follow-up of diabetes.    30 minutes spent on pt on the day of the encounter including documentation time, chart review, discussion with pt, and coordination of care     #1 Type I diabetes mellitus   The patient was initially seen in May 2011 at which time he had a 30 pound weight loss with polyuria, polydipsia, serum glucose of 466, and Hemoglobin A1c of 13.5%.  His C-peptide was 0.8 with a glucose of 318.  DEMARCO antibodies were negative.  He was initially started on Metformin however his blood sugars did not improve therefore he was switched to Lantus and Novolog.  Blood work in September 2011 was significant for negative anti-islet and anti-insulin antibodies.  Since starting insulin, his diabetes has been well controlled with Hemoglobin A1c values all <7%.  He started on a Medtronic insulin pump in July 2012 and switched to the closed-loop system in January 2018.  The patient did not like the Medtronic closed-loop system due to the alarms.  so he is on the Medtronic pump with the Dexcom G6 sensor (started in January 2021).  He reports that he is extremely happy with the Dexcom G6 sensor and feels that this has been useful.    October 2021 hemoglobin A1c of 5.9. The patient has been on the tandem t:slim pump with control IQ since roughly November 2021.  He is very satisfied with the program.  July 2022 hemoglobin A1c of 5.6.  July 2022 labs show negative urine for protein, negative TTG, hemoglobin A1c of 5.6, vitamin D of 42, and TSH of 0.72.     Interval history: Patient continues on lisinopril 15 mg daily.  Feels well. Likes dexcom g7 sensor.    Blood Glucose Monitoring:  Glucose 136, 86% within range, 10% above range,     Insulin program:  Average daily dose of 45  units, 34% (15 units) basal, 66% (29 units)  bolus    Pump settings as noted below:  Basal rate:  Midnight to 6 AM: 0.5 units/h  6 AM to 11 PM: 0.7 units/h  11 PM to midnight: 0.5 units/h    Correction factor:  Midnight to 6 AM: 1 per 45  6 AM to 10 AM: 1 per 35  10 AM to 11 pm: 1 per 40  11 PM to midnight: 1 per 40    Carb ratio:  Midnight to 6 AM: 1 per 12  6 AM to 11 PM: 1 per 9  9 PM to midnight: 1 per 12    Target glucose: 120    Diabetes monitoring and complications:  CAD: July 2024 LDL of 57  Last eye exam results: Negative in September 2023  Microalbuminuria: Negative in July 2024 on lisinopril  Neuropathy: No  HTN: Yes, on Lisinopril   On Statin: No  On Aspirin: No  Depression: No  Erectile dysfunction: No     #2 Low vitamin D  There is a history of low vitamin D.   July 2022 vitamin D was normal at 42. July 2023 vitamin D was 50    Interval history: July 2024 vitamin D was 45    Review of Systems:   Pertinent items are noted in HPI.  All other systems are negative.    Active Medications:   Current Outpatient Medications   Medication Sig Dispense Refill    acetone, Urine, test STRP 1 each by In Vitro route as needed. 50 strip 3    bisacodyl (DULCOLAX) 5 MG EC tablet Take 2 tablets at 3 pm the day before your procedure. If your procedure is before 11 am, take 2 additional tablets at 11 pm. If your procedure is after 11 am, take 2 additional tablets at 6 am. For additional instructions refer to your colonoscopy prep instructions. 4 tablet 0    blood glucose (TOSHA CONTOUR) test strip strips work with meter / pump. Test 6 times daily  Tosha contour  NEXT strips 600 strip 3    Blood Pressure Monitor KIT Take bp 2 times a week or PRN problems.  Dx DM 1 kit 0    Continuous Blood Gluc Sensor (DEXCOM G7 SENSOR) MISC Change every 10 days. 3 each 11    Glucagon (BAQSIMI TWO PACK) 3 MG/DOSE POWD Spray 1 Application in nostril as needed (as needed) Cost checking only 1 each 1    insulin aspart (NOVOLOG VIAL) 100 UNITS/ML vial Use in pump approx 50 units daily 50  "mL 3    insulin glargine (LANTUS PEN) 100 UNIT/ML pen Pt to be on lantus 15 units daily if pump fails 3 mL 0    Insulin Infusion Pump Supplies (AUTOSOFT XC INFUSION SET) MISC 1 each every 3 days Autosoft XC 9 mm cannula, 23 inch tubing. 40 each 1    Insulin Infusion Pump Supplies (T:SLIM X2 3ML CARTRIDGE) MISC Inject 1 applicator Subcutaneous every 48 hours Insulin cartridge to be used with pump as directed.  Change every 2-3 days or as directed. 120 each 5    lisinopril (ZESTRIL) 5 MG tablet Take 3 tablets (15 mg) by mouth daily Take 3 tablet (15 mg) daily. 270 tablet 4    MICROLET LANCETS MISC 1 each 6 times daily 200 each 11    NOVOLOG FLEXPEN 100 UNIT/ML soln Use for pump failure 50 units daily 15 mL 2    polyethylene glycol (GOLYTELY) 236 g suspension The night before the exam at 6 pm drink an 8-ounce glass every 15 minutes until the jug is half empty. If you arrive before 11 AM: Drink the other half of the Golytely jug at 11 PM night before procedure. If you arrive after 11 AM: Drink the other half of the Golytely jug at 6 AM day of procedure. For additional instructions refer to your colonoscopy prep instructions. 4000 mL 0    valACYclovir (VALTREX) 1000 mg tablet Take 1 tablet (1,000 mg) by mouth 3 times daily for 7 days 21 tablet 0     Allergies:   Benadryl [altaryl]      Past Medical History:  Type I diabetes mellitus   Hypertension      Past Surgical History:  History reviewed. No pertinent past surgical history.      Family History:   Thyroid disease - mother  Hypertension - mother      Social History:   The patient works at the [employer].     Physical Exam:   Blood pressure 127/81, pulse 57, height 1.765 m (5' 9.5\"), weight 73.5 kg (162 lb), SpO2 98%.     GENERAL APPEARANCE: Alert and no distress  NECK: No lymphadenopathy appreciated  Thyroid: No obvious nodules palpated   CV: RRR without M/R/G  Lungs: CTA bilaterally  Abdomen: Soft, Nontender, non distended, positive bowel sounds   Neuro: no " focal deficits  Skin: No infection in feet , intact to monofilament  Mood: Normal   Lymph: neg in neck and supraclavicular area        Data:    No visits with results within 1 Week(s) from this visit.   Latest known visit with results is:   Lab on 07/12/2024   Component Date Value Ref Range Status    Hemoglobin A1C 07/12/2024 5.9 (H)  <5.7 % Final    Normal <5.7%   Prediabetes 5.7-6.4%    Diabetes 6.5% or higher     Note: Adopted from ADA consensus guidelines.    25 OH Vitamin D2 07/12/2024 <5  ug/L Final    25 OH Vitamin D3 07/12/2024 40  ug/L Final    25 OH Vit D Total 07/12/2024 <45  20 - 75 ug/L Final    Season, race, dietary intake, and treatment affect the concentration of 25-hydroxy-Vitamin D. Values may decrease during winter months and increase during summer months. Values 20-29 ug/L may indicate Vitamin D insufficiency and values <20 ug/L may indicate Vitamin D deficiency.    TSH 07/12/2024 0.83  0.30 - 4.20 uIU/mL Final    Cholesterol 07/12/2024 125  <200 mg/dL Final    Triglycerides 07/12/2024 53  <150 mg/dL Final    Direct Measure HDL 07/12/2024 57  >=40 mg/dL Final    LDL Cholesterol Calculated 07/12/2024 57  <=100 mg/dL Final    Non HDL Cholesterol 07/12/2024 68  <130 mg/dL Final    Patient Fasting > 8hrs? 07/12/2024 No   Final    Creatinine Urine mg/dL 07/12/2024 27.7  mg/dL Final    The reference ranges have not been established in urine creatinine. The results should be integrated into the clinical context for interpretation.    Albumin Urine mg/L 07/12/2024 <12.0  mg/L Final    The reference ranges have not been established in urine albumin. The results should be integrated into the clinical context for interpretation.    Albumin Urine mg/g Cr 07/12/2024    Final    Unable to calculate, urine albumin and/or urine creatinine is outside detectable limits.  Microalbuminuria is defined as an albumin:creatinine ratio of 17 to 299 for males and 25 to 299 for females. A ratio of albumin:creatinine of 300  or higher is indicative of overt proteinuria.  Due to biologic variability, positive results should be confirmed by a second, first-morning random or 24-hour timed urine specimen. If there is discrepancy, a third specimen is recommended. When 2 out of 3 results are in the microalbuminuria range, this is evidence for incipient nephropathy and warrants increased efforts at glucose control, blood pressure control, and institution of therapy with an angiotensin-converting-enzyme (ACE) inhibitor (if the patient can tolerate it).      Sodium 07/12/2024 136  135 - 145 mmol/L Final    Potassium 07/12/2024 4.5  3.4 - 5.3 mmol/L Final    Chloride 07/12/2024 101  98 - 107 mmol/L Final    Carbon Dioxide (CO2) 07/12/2024 26  22 - 29 mmol/L Final    Anion Gap 07/12/2024 9  7 - 15 mmol/L Final    Urea Nitrogen 07/12/2024 9.9  6.0 - 20.0 mg/dL Final    Creatinine 07/12/2024 0.66 (L)  0.67 - 1.17 mg/dL Final    GFR Estimate 07/12/2024 >90  >60 mL/min/1.73m2 Final    eGFR calculated using 2021 CKD-EPI equation.    Calcium 07/12/2024 9.4  8.6 - 10.0 mg/dL Final    Glucose 07/12/2024 171 (H)  70 - 99 mg/dL Final    Patient Fasting > 8hrs? 07/12/2024 No   Final    Tissue Transglutaminase Antibody I* 07/12/2024 0.5  <7.0 U/mL Final    Negative- The tTG-IgA assay has limited utility for patients with decreased levels of IgA. Screening for celiac disease should include IgA testing to rule out selective IgA deficiency and to guide selection and interpretation of serological testing. tTG-IgG testing may be positive in celiac disease patients with IgA deficiency.    Tissue Transglutaminase Antibody I* 07/12/2024 0.8  <7.0 U/mL Final    Negative       Assessment and Plan:  #1 Type I diabetes mellitus   Patient is now on a t:slim pump with control IQ and dexcom g7- doing well. He will let me know if he wants the tandem MOBI.  Sripts updates.     #2 hypertension  Continue lisinopril 15 mg daily-prescription sent to pharmacy.    #3 history of low  vitamin D  Now resolved    Return  visit in 1 year with labs before return visit    Orders Placed This Encounter   Procedures    25 Hydroxyvitamin D2 and D3    TSH    Lipid Profile    Albumin Random Urine Quantitative with Creat Ratio    Basic metabolic panel    Hemoglobin A1c    Tissue transglutaminase rylee IgA and IgG

## 2024-07-19 ENCOUNTER — OFFICE VISIT (OUTPATIENT)
Dept: ENDOCRINOLOGY | Facility: CLINIC | Age: 44
End: 2024-07-19
Payer: COMMERCIAL

## 2024-07-19 VITALS
BODY MASS INDEX: 23.19 KG/M2 | SYSTOLIC BLOOD PRESSURE: 127 MMHG | HEART RATE: 57 BPM | DIASTOLIC BLOOD PRESSURE: 81 MMHG | WEIGHT: 162 LBS | HEIGHT: 70 IN | OXYGEN SATURATION: 98 %

## 2024-07-19 DIAGNOSIS — E10.9 CONTROLLED DIABETES MELLITUS TYPE 1 WITHOUT COMPLICATIONS (H): ICD-10-CM

## 2024-07-19 PROCEDURE — 99214 OFFICE O/P EST MOD 30 MIN: CPT | Performed by: INTERNAL MEDICINE

## 2024-07-19 RX ORDER — INSULIN ASPART 100 [IU]/ML
INJECTION, SOLUTION INTRAVENOUS; SUBCUTANEOUS
Qty: 15 ML | Refills: 2 | Status: SHIPPED | OUTPATIENT
Start: 2024-07-19

## 2024-07-19 RX ORDER — INSULIN ASPART 100 [IU]/ML
INJECTION, SOLUTION INTRAVENOUS; SUBCUTANEOUS
Qty: 50 ML | Refills: 3 | Status: SHIPPED | OUTPATIENT
Start: 2024-07-19

## 2024-07-19 RX ORDER — LISINOPRIL 5 MG/1
15 TABLET ORAL DAILY
Qty: 270 TABLET | Refills: 4 | Status: SHIPPED | OUTPATIENT
Start: 2024-07-19

## 2024-07-19 RX ORDER — ACYCLOVIR 400 MG/1
TABLET ORAL
Qty: 3 EACH | Refills: 11 | Status: SHIPPED | OUTPATIENT
Start: 2024-07-19

## 2024-07-19 ASSESSMENT — PAIN SCALES - GENERAL: PAINLEVEL: NO PAIN (0)

## 2024-07-19 NOTE — LETTER
7/19/2024       RE: Joseph O Thoen  [address] [facility] [place] [address]     Dear Colleague,    Thank you for referring your patient, Joseph O Thoen, to the [facility] [place] [facility]. Please see a copy of my visit note below.    06/19/24 11:09 AM  PATIENT LAB/IMAGING STATUS : MyChart sent to patient to complete standing/future orders     Adeline Levine CMA                INPERSON VISIT    7/19/24      Chief Complaint:   Diabetes     History of Present Illness:   Joseph O Thoen is a 44 year old male with a history of type I diabetes mellitus who presents alone for follow-up of diabetes.    30 minutes spent on pt on the day of the encounter including documentation time, chart review, discussion with pt, and coordination of care     #1 Type I diabetes mellitus   The patient was initially seen in May 2011 at which time he had a 30 pound weight loss with polyuria, polydipsia, serum glucose of 466, and Hemoglobin A1c of 13.5%.  His C-peptide was 0.8 with a glucose of 318.  DEMARCO antibodies were negative.  He was initially started on Metformin however his blood sugars did not improve therefore he was switched to Lantus and Novolog.  Blood work in September 2011 was significant for negative anti-islet and anti-insulin antibodies.  Since starting insulin, his diabetes has been well controlled with Hemoglobin A1c values all <7%.  He started on a Medtronic insulin pump in July 2012 and switched to the closed-loop system in January 2018.  The patient did not like the Medtronic closed-loop system due to the alarms.  so he is on the Medtronic pump with the Dexcom G6 sensor (started in January 2021).  He reports that he is extremely happy with the Dexcom G6 sensor and feels that this has been useful.    October 2021 hemoglobin A1c of 5.9. The patient has been on the tandem t:slim pump with control IQ since roughly November 2021.  He is very  satisfied with the program.  July 2022 hemoglobin A1c of 5.6.  July 2022 labs show negative urine for protein, negative TTG, hemoglobin A1c of 5.6, vitamin D of 42, and TSH of 0.72.     Interval history: Patient continues on lisinopril 15 mg daily.  Feels well. Likes dexcom g7 sensor.    Blood Glucose Monitoring:  Glucose 136, 86% within range, 10% above range,     Insulin program:  Average daily dose of 45  units, 34% (15 units) basal, 66% (29 units) bolus    Pump settings as noted below:  Basal rate:  Midnight to 6 AM: 0.5 units/h  6 AM to 11 PM: 0.7 units/h  11 PM to midnight: 0.5 units/h    Correction factor:  Midnight to 6 AM: 1 per 45  6 AM to 10 AM: 1 per 35  10 AM to 11 pm: 1 per 40  11 PM to midnight: 1 per 40    Carb ratio:  Midnight to 6 AM: 1 per 12  6 AM to 11 PM: 1 per 9  9 PM to midnight: 1 per 12    Target glucose: 120    Diabetes monitoring and complications:  CAD: July 2024 LDL of 57  Last eye exam results: Negative in September 2023  Microalbuminuria: Negative in July 2024 on lisinopril  Neuropathy: No  HTN: Yes, on Lisinopril   On Statin: No  On Aspirin: No  Depression: No  Erectile dysfunction: No     #2 Low vitamin D  There is a history of low vitamin D.   July 2022 vitamin D was normal at 42. July 2023 vitamin D was 50    Interval history: July 2024 vitamin D was 45    Review of Systems:   Pertinent items are noted in HPI.  All other systems are negative.    Active Medications:   Current Outpatient Medications   Medication Sig Dispense Refill    acetone, Urine, test STRP 1 each by In Vitro route as needed. 50 strip 3    bisacodyl (DULCOLAX) 5 MG EC tablet Take 2 tablets at 3 pm the day before your procedure. If your procedure is before 11 am, take 2 additional tablets at 11 pm. If your procedure is after 11 am, take 2 additional tablets at 6 am. For additional instructions refer to your colonoscopy prep instructions. 4 tablet 0    blood glucose (STAS CONTOUR) test strip strips work with meter  / pump. Test 6 times daily  Tosha contour  NEXT strips 600 strip 3    Blood Pressure Monitor KIT Take bp 2 times a week or PRN problems.  Dx DM 1 kit 0    Continuous Blood Gluc Sensor (DEXCOM G7 SENSOR) MISC Change every 10 days. 3 each 11    Glucagon (BAQSIMI TWO PACK) 3 MG/DOSE POWD Spray 1 Application in nostril as needed (as needed) Cost checking only 1 each 1    insulin aspart (NOVOLOG VIAL) 100 UNITS/ML vial Use in pump approx 50 units daily 50 mL 3    insulin glargine (LANTUS PEN) 100 UNIT/ML pen Pt to be on lantus 15 units daily if pump fails 3 mL 0    Insulin Infusion Pump Supplies (AUTOSOFT XC INFUSION SET) MISC 1 each every 3 days Autosoft XC 9 mm cannula, 23 inch tubing. 40 each 1    Insulin Infusion Pump Supplies (T:SLIM X2 3ML CARTRIDGE) MISC Inject 1 applicator Subcutaneous every 48 hours Insulin cartridge to be used with pump as directed.  Change every 2-3 days or as directed. 120 each 5    lisinopril (ZESTRIL) 5 MG tablet Take 3 tablets (15 mg) by mouth daily Take 3 tablet (15 mg) daily. 270 tablet 4    MICROLET LANCETS MISC 1 each 6 times daily 200 each 11    NOVOLOG FLEXPEN 100 UNIT/ML soln Use for pump failure 50 units daily 15 mL 2    polyethylene glycol (GOLYTELY) 236 g suspension The night before the exam at 6 pm drink an 8-ounce glass every 15 minutes until the jug is half empty. If you arrive before 11 AM: Drink the other half of the Golytely jug at 11 PM night before procedure. If you arrive after 11 AM: Drink the other half of the Golytely jug at 6 AM day of procedure. For additional instructions refer to your colonoscopy prep instructions. 4000 mL 0    valACYclovir (VALTREX) 1000 mg tablet Take 1 tablet (1,000 mg) by mouth 3 times daily for 7 days 21 tablet 0     Allergies:   Benadryl [altaryl]      Past Medical History:  Type I diabetes mellitus   Hypertension      Past Surgical History:  History reviewed. No pertinent past surgical history.      Family History:   Thyroid disease -  "mother  Hypertension - mother      Social History:   The patient works at the [employer].     Physical Exam:   Blood pressure 127/81, pulse 57, height 1.765 m (5' 9.5\"), weight 73.5 kg (162 lb), SpO2 98%.     GENERAL APPEARANCE: Alert and no distress  NECK: No lymphadenopathy appreciated  Thyroid: No obvious nodules palpated   CV: RRR without M/R/G  Lungs: CTA bilaterally  Abdomen: Soft, Nontender, non distended, positive bowel sounds   Neuro: no focal deficits  Skin: No infection in feet , intact to monofilament  Mood: Normal   Lymph: neg in neck and supraclavicular area        Data:    No visits with results within 1 Week(s) from this visit.   Latest known visit with results is:   Lab on 07/12/2024   Component Date Value Ref Range Status    Hemoglobin A1C 07/12/2024 5.9 (H)  <5.7 % Final    Normal <5.7%   Prediabetes 5.7-6.4%    Diabetes 6.5% or higher     Note: Adopted from ADA consensus guidelines.    25 OH Vitamin D2 07/12/2024 <5  ug/L Final    25 OH Vitamin D3 07/12/2024 40  ug/L Final    25 OH Vit D Total 07/12/2024 <45  20 - 75 ug/L Final    Season, race, dietary intake, and treatment affect the concentration of 25-hydroxy-Vitamin D. Values may decrease during winter months and increase during summer months. Values 20-29 ug/L may indicate Vitamin D insufficiency and values <20 ug/L may indicate Vitamin D deficiency.    TSH 07/12/2024 0.83  0.30 - 4.20 uIU/mL Final    Cholesterol 07/12/2024 125  <200 mg/dL Final    Triglycerides 07/12/2024 53  <150 mg/dL Final    Direct Measure HDL 07/12/2024 57  >=40 mg/dL Final    LDL Cholesterol Calculated 07/12/2024 57  <=100 mg/dL Final    Non HDL Cholesterol 07/12/2024 68  <130 mg/dL Final    Patient Fasting > 8hrs? 07/12/2024 No   Final    Creatinine Urine mg/dL 07/12/2024 27.7  mg/dL Final    The reference ranges have not been established in urine creatinine. The results should be integrated into the clinical context for interpretation.    Albumin Urine " mg/L 07/12/2024 <12.0  mg/L Final    The reference ranges have not been established in urine albumin. The results should be integrated into the clinical context for interpretation.    Albumin Urine mg/g Cr 07/12/2024    Final    Unable to calculate, urine albumin and/or urine creatinine is outside detectable limits.  Microalbuminuria is defined as an albumin:creatinine ratio of 17 to 299 for males and 25 to 299 for females. A ratio of albumin:creatinine of 300 or higher is indicative of overt proteinuria.  Due to biologic variability, positive results should be confirmed by a second, first-morning random or 24-hour timed urine specimen. If there is discrepancy, a third specimen is recommended. When 2 out of 3 results are in the microalbuminuria range, this is evidence for incipient nephropathy and warrants increased efforts at glucose control, blood pressure control, and institution of therapy with an angiotensin-converting-enzyme (ACE) inhibitor (if the patient can tolerate it).      Sodium 07/12/2024 136  135 - 145 mmol/L Final    Potassium 07/12/2024 4.5  3.4 - 5.3 mmol/L Final    Chloride 07/12/2024 101  98 - 107 mmol/L Final    Carbon Dioxide (CO2) 07/12/2024 26  22 - 29 mmol/L Final    Anion Gap 07/12/2024 9  7 - 15 mmol/L Final    Urea Nitrogen 07/12/2024 9.9  6.0 - 20.0 mg/dL Final    Creatinine 07/12/2024 0.66 (L)  0.67 - 1.17 mg/dL Final    GFR Estimate 07/12/2024 >90  >60 mL/min/1.73m2 Final    eGFR calculated using 2021 CKD-EPI equation.    Calcium 07/12/2024 9.4  8.6 - 10.0 mg/dL Final    Glucose 07/12/2024 171 (H)  70 - 99 mg/dL Final    Patient Fasting > 8hrs? 07/12/2024 No   Final    Tissue Transglutaminase Antibody I* 07/12/2024 0.5  <7.0 U/mL Final    Negative- The tTG-IgA assay has limited utility for patients with decreased levels of IgA. Screening for celiac disease should include IgA testing to rule out selective IgA deficiency and to guide selection and interpretation of serological testing.  tTG-IgG testing may be positive in celiac disease patients with IgA deficiency.    Tissue Transglutaminase Antibody I* 07/12/2024 0.8  <7.0 U/mL Final    Negative       Assessment and Plan:  #1 Type I diabetes mellitus   Patient is now on a t:slim pump with control IQ and dexcom g7- doing well. He will let me know if he wants the tandem MOBI.  Sripts updates.     #2 hypertension  Continue lisinopril 15 mg daily-prescription sent to pharmacy.    #3 history of low vitamin D  Now resolved    Return  visit in 1 year with labs before return visit    Orders Placed This Encounter   Procedures    25 Hydroxyvitamin D2 and D3    TSH    Lipid Profile    Albumin Random Urine Quantitative with Creat Ratio    Basic metabolic panel    Hemoglobin A1c    Tissue transglutaminase rylee IgA and IgG        Bushra Rosa MD

## 2024-08-14 RX ORDER — BISACODYL 5 MG/1
TABLET, DELAYED RELEASE ORAL
Qty: 4 TABLET | Refills: 0 | Status: ON HOLD | OUTPATIENT
Start: 2024-08-14 | End: 2024-09-04

## 2024-08-14 NOTE — TELEPHONE ENCOUNTER
Standard Golytely Bowel Prep recommended due to diabetes.  Instructions were sent via [employer]. Bowel prep was sent 8/14/2024 to    [place], [place] - [address] [place] [address] [phone]    Sally Betancourt RN on 8/14/2024 at 10:49 AM

## 2024-08-23 NOTE — TELEPHONE ENCOUNTER
Rescheduled colonoscopy    Attempted to contact patient in order to complete pre assessment questions.     No answer. Left message to return call to [phone] option 4    Callback required communication sent via [employer].      Procedure details:    Patient scheduled for Colonoscopy on 9.4.24.     Arrival time: 1200. Procedure time 1300    Facility location: [facility]; [address] [place] [address], [address] Floor, [place] [address]. Check in location: Main entrance at registration desk.    Sedation type: Conscious sedation     Pre op exam needed? No.    Indication for procedure: screening      Chart review:     Electronic implanted devices? No    Recent diagnosis of diverticulitis within the last 6 weeks? No      Medication review:    Diabetic? Yes. Diabetic medication HOLDING recommendations: Insulin: Consult with managing provider     Anticoagulants? No    Weight loss medication/injectable? No.    NSAIDS? No NSAID medications per patient's medication list.  RN will verify with pre-assessment call.    Other medication HOLDING recommendations:  N/A      Prep for procedure:     Bowel prep recommendation: Standard Golytely. Bowel prep prescription PREVIOUSLY sent to    [facility] - [place], [place] - [address] [place] [address] [phone] - inquire if patient has this    Due to: diabetes.     Prep instructions sent via [employer].       Nettie Lugo RN  Endoscopy Procedure Pre Assessment

## 2024-08-26 NOTE — TELEPHONE ENCOUNTER
Pre assessment completed for upcoming procedure.   (Please see previous telephone encounter notes for complete details)    Patient  returned call.       Procedure details:    Arrival time and facility location reviewed.    Pre op exam needed? No.    Designated  policy reviewed. Instructed to have someone stay 6  hours post procedure.       Medication review:    Insulin.  Consult with your managing provider.       Prep for procedure:     Procedure prep instructions reviewed.        Any additional information needed:  N/A      Patient  verbalized understanding and had no questions or concerns at this time.      Graciela Ruvalcaba RN  Endoscopy Procedure Pre Assessment   [phone] option 4

## 2024-09-03 NOTE — H&P
Gastroenterology Pre-op History and Physical    Joseph O Thoen MRN# [id number]   Age: 44 year old YOB: 1980      Date of Surgery: 09/03/24  [facility]      Date of Exam 9/3/2024 Facility Same Day       Primary care provider: No Ref-Primary, Physician         Chief Complaint and/or Reason for Procedure:   43 yo male for screening colonoscopy.  No prior exams.  Brother and father with polyps but not cancer or need for surgical resection.  No anticoagulation.         Past Medical and Surgical History:     Past Medical History:   Diagnosis Date    Diabetes (H)     HTN (hypertension)      Past Surgical History:   Procedure Laterality Date    NO HISTORY OF SURGERY              Medications (include herbals and vitamins):        No medications prior to admission.             Allergies:      Allergies   Allergen Reactions    Benadryl [Diphenhydramine Hcl] Itching               Physical Exam:   All vitals have been reviewed  No data found.  No intake/output data recorded.  Airway assessment:   Patient is able to open mouth wide  Patient is able to stick out tongue  Mallampatti classification: Class I (visualization of the soft palate, fauces, uvula, anterior and posterior pillars)}      Lungs:   No increased work of breathing, good air exchange, clear to auscultation bilaterally, no crackles or wheezing     Cardiovascular:   regular rate and rhythm and normal S1 and S2                 Anesthetic risk and/or ASA classification:   ASA 2    Benito Lamas MD

## 2024-09-04 ENCOUNTER — HOSPITAL ENCOUNTER (OUTPATIENT)
Facility: CLINIC | Age: 44
Discharge: HOME | End: 2024-09-04
Attending: INTERNAL MEDICINE | Admitting: INTERNAL MEDICINE
Payer: COMMERCIAL

## 2024-09-04 VITALS
OXYGEN SATURATION: 97 % | SYSTOLIC BLOOD PRESSURE: 119 MMHG | DIASTOLIC BLOOD PRESSURE: 56 MMHG | RESPIRATION RATE: 18 BRPM | HEART RATE: 74 BPM

## 2024-09-04 LAB
COLONOSCOPY: NORMAL
GLUCOSE BLDC GLUCOMTR-MCNC: 110 MG/DL (ref 70–99)
GLUCOSE BLDC GLUCOMTR-MCNC: 115 MG/DL (ref 70–99)

## 2024-09-04 PROCEDURE — 99153 MOD SED SAME PHYS/QHP EA: CPT | Performed by: INTERNAL MEDICINE

## 2024-09-04 PROCEDURE — 82962 GLUCOSE BLOOD TEST: CPT

## 2024-09-04 PROCEDURE — G0121 COLON CA SCRN NOT HI RSK IND: HCPCS | Performed by: INTERNAL MEDICINE

## 2024-09-04 PROCEDURE — 45378 DIAGNOSTIC COLONOSCOPY: CPT | Performed by: INTERNAL MEDICINE

## 2024-09-04 PROCEDURE — 250N000011 HC RX IP 250 OP 636: Performed by: INTERNAL MEDICINE

## 2024-09-04 PROCEDURE — G0500 MOD SEDAT ENDO SERVICE >5YRS: HCPCS | Performed by: INTERNAL MEDICINE

## 2024-09-04 RX ORDER — NALOXONE HYDROCHLORIDE 0.4 MG/ML
0.2 INJECTION, SOLUTION INTRAMUSCULAR; INTRAVENOUS; SUBCUTANEOUS
Status: DISCONTINUED | OUTPATIENT
Start: 2024-09-04 | End: 2024-09-04 | Stop reason: HOSPADM

## 2024-09-04 RX ORDER — NALOXONE HYDROCHLORIDE 0.4 MG/ML
0.4 INJECTION, SOLUTION INTRAMUSCULAR; INTRAVENOUS; SUBCUTANEOUS
Status: DISCONTINUED | OUTPATIENT
Start: 2024-09-04 | End: 2024-09-04 | Stop reason: HOSPADM

## 2024-09-04 RX ORDER — FLUMAZENIL 0.1 MG/ML
0.2 INJECTION, SOLUTION INTRAVENOUS
Status: DISCONTINUED | OUTPATIENT
Start: 2024-09-04 | End: 2024-09-04 | Stop reason: HOSPADM

## 2024-09-04 RX ORDER — FENTANYL CITRATE 50 UG/ML
INJECTION, SOLUTION INTRAMUSCULAR; INTRAVENOUS PRN
Status: DISCONTINUED | OUTPATIENT
Start: 2024-09-04 | End: 2024-09-04 | Stop reason: HOSPADM

## 2024-09-04 RX ORDER — PROCHLORPERAZINE MALEATE 5 MG
10 TABLET ORAL EVERY 6 HOURS PRN
Status: DISCONTINUED | OUTPATIENT
Start: 2024-09-04 | End: 2024-09-04 | Stop reason: HOSPADM

## 2024-09-04 RX ORDER — ONDANSETRON 2 MG/ML
4 INJECTION INTRAMUSCULAR; INTRAVENOUS EVERY 6 HOURS PRN
Status: DISCONTINUED | OUTPATIENT
Start: 2024-09-04 | End: 2024-09-04 | Stop reason: HOSPADM

## 2024-09-04 RX ORDER — ONDANSETRON 4 MG/1
4 TABLET, ORALLY DISINTEGRATING ORAL EVERY 6 HOURS PRN
Status: DISCONTINUED | OUTPATIENT
Start: 2024-09-04 | End: 2024-09-04 | Stop reason: HOSPADM

## 2024-09-04 ASSESSMENT — ACTIVITIES OF DAILY LIVING (ADL)
ADLS_ACUITY_SCORE: 35

## 2024-10-25 ENCOUNTER — TELEPHONE (OUTPATIENT)
Dept: ENDOCRINOLOGY | Facility: CLINIC | Age: 44
End: 2024-10-25
Payer: COMMERCIAL

## 2024-12-16 NOTE — MR AVS SNAPSHOT
After Visit Summary   1/19/2018    Joseph O Thoen    MRN: [id number]           Patient Information     Date Of Birth          1980        Visit Information        Provider Department      1/19/2018 8:00 AM Bushra Rosa MD [facility]        Today's Diagnoses     Controlled diabetes mellitus type 1 without complications (H)    -  1      Care Instructions    Increase lisinopril to 15 mg daily and recheck labs in 1 week          Follow-ups after your visit        Additional Services     OPHTHALMOLOGY ADULT REFERRAL       Pt has diabetes                  Follow-up notes from your care team     Return in about 6 months (around 7/19/2018).      Your next 10 appointments already scheduled     Jan 26, 2018  8:00 AM CST   LAB with  LAB   [facility] ([facility])    [address] [place] [address]  [address] [facility] [id number] [phone]           Please do not eat 10-12 hours before your appointment if you are coming in fasting for labs on lipids, cholesterol, or glucose (sugar). This does not apply to pregnant women. Water, hot tea and black coffee (with nothing added) are okay. Do not drink other fluids, diet soda or chew gum.            Jan 26, 2018  8:20 AM CST   (Arrive by 8:05 AM)   RETURN GENERAL with Brodie Cheek OD   [facility] ([facility])    [address] [place] [address]  [address] [facility] [id number] [phone]            Jul 20, 2018  8:30 AM CDT   (Arrive by 8:15 AM)   RETURN DIABETES with Bushra Rosa MD   [facility] ([facility])    [address] [place] [address]  [address] [facility] [id number] [phone]              Future tests that were ordered for you today     Open Future Orders        Priority Expected Expires Ordered    25 Hydroxyvitamin D2 and D3 Routine 1/19/2018 1/31/2018 1/19/2018    Albumin level Routine 1/19/2018 1/31/2018 1/19/2018    TSH Routine  NUTRITION ASSESSMENT  Pt presents for weight management MNT education.  Referring/Supervising Provider: Adriana Durham MD   Diagnosis: Obesity (BMI 30-39.9)    Food Recall:  Breakfast: 9-10 am bread with coffee OR cookies with coffee (sugar, cinnamon)   AM Snack:   Lunch: 11-2 pm 2 eggs, 2 tortilla  OR ham and cheese quesadilla   PM Snack:   Dinner: 6-7 pm 4 tortillas, meat, vegetables   Evening Snacks: coffee with sweet bread OR none   Beverages: coffee with sugar, water, tea   Fruits: 2 x per week   Vegetables: 3-4 x per week   Exercise: not currently     Pt with concern for weight loss. Pt has tried eating less and exercising for weight loss in the past.       Education  Discussed/Instructed on the Following    Discussed Healthy Plate Method, standard portion sizes, heart healthy dietary pattern, and nutrition facts label.   Discussed identifying carb food sources  Recommended using measuring cups to help visualize portion sizes when cooking at home.  Discussed HDL vs LDL, saturated vs unsaturated fats, dietary sources of saturated and unsaturated fats, and strategies to limit sodium intake.  Discussed importance of eating 3 meals daily with snacks as needed.  Emphasized balance when planning both meals and snacks.   Discussed gradually increasing physical activity to 150 minutes/week.        Handouts/Resources Provided/Introduced:  Planning Healthy Meals    Nutrition Assessment  Support Structure Present: yes   Patient Understands Disease State: yes     Current Activity Level:  Sedentary    Primary Meal Preparer: patient    Dinning Out:  4 x per month    Alcohol Consumption:  Never    Barriers to Reaching Goals:   None    Treatment Plan  Goals:  1-2 workouts per week  Follow my plate for portion control  Reduce carbs in diet    35 minutes spent with pt face-to-face.     Cinthya Akbar RD   "1/19/2018 1/31/2018 1/19/2018    Lipid Profile Routine 1/19/2018 1/31/2018 1/19/2018    Albumin Random Urine Quantitative with Creat Ratio Routine 1/19/2018 1/31/2018 1/19/2018    Tissue transglutaminase rylee IgA and IgG Routine 1/19/2018 1/31/2018 1/19/2018    Basic metabolic panel Routine 1/26/2018 1/31/2018 1/19/2018            Who to contact     Please call your clinic at [phone] to:    Ask questions about your health    Make or cancel appointments    Discuss your medicines    Learn about your test results    Speak to your doctor   If you have compliments or concerns about an experience at your clinic, or if you wish to file a complaint, please contact [facility] Patient Relations at [phone] or email us at [email or web address]         Additional Information About Your Visit        [employer] Information     [email or web address] gives you secure access to your electronic health record. If you see a primary care provider, you can also send messages to your care team and make appointments. If you have questions, please call your primary care clinic.  If you do not have a primary care provider, please call [phone] and they will assist you.      [employer] is an electronic gateway that provides easy, online access to your medical records. With [employer], you can request a clinic appointment, read your test results, renew a prescription or communicate with your care team.     To access your existing account, please contact your [facility] or call [phone] for assistance.        Care EveryWhere ID     This is your Care EveryWhere ID. This could be used by other organizations to access your [facility] medical records  [id number]        Your Vitals Were     Pulse Height BMI (Body Mass Index)             60 1.778 m (5' 10\") 21.39 kg/m2          Blood Pressure from Last 3 Encounters:   01/19/18 145/82   07/14/17 144/79   01/31/17 131/84    Weight from Last 3 " Encounters:   01/19/18 67.6 kg (149 lb 1.6 oz)   07/14/17 66.4 kg (146 lb 4.8 oz)   01/31/17 65.2 kg (143 lb 11.2 oz)              We Performed the Following     OPHTHALMOLOGY ADULT REFERRAL          Today's Medication Changes          These changes are accurate as of: 1/19/18 10:32 AM.  If you have any questions, ask your nurse or doctor.               Start taking these medicines.        Dose/Directions    blood glucose monitoring test strip   Commonly known as:  SATS CONTOUR   Used for:  Controlled diabetes mellitus type 1 without complications (H)        Needs these test strips because this is the only meter that works with his pump.  Will need to test 6 times daily   Quantity:  200 strip   Refills:  prn         These medicines have changed or have updated prescriptions.        Dose/Directions    insulin aspart 100 UNITS/ML injection   Commonly known as:  NovoLOG VIAL   This may have changed:  additional instructions   Used for:  Controlled diabetes mellitus type 1 without complications (H)        Pt uses about 50 units per day in the pump   Quantity:  50 mL   Refills:  3       insulin glargine 100 UNIT/ML injection   Commonly known as:  LANTUS   This may have changed:  how much to take   Used for:  Controlled diabetes mellitus type 1 without complications (H)        Dose:  16 Units   Inject 16 Units Subcutaneous At Bedtime To provide basal insulin If pump fails   Quantity:  3 mL   Refills:  0       lisinopril 5 MG tablet   Commonly known as:  PRINIVIL/ZESTRIL   This may have changed:    - medication strength  - how much to take  - how to take this  - when to take this  - additional instructions   Used for:  Controlled diabetes mellitus type 1 without complications (H)        Take 3 tablet (15 mg) daily   Quantity:  270 tablet   Refills:  1         Stop taking these medicines if you haven't already. Please contact your care team if you have questions.     BD KOKI U/F 32G X 4 MM   Generic drug:  insulin pen  "needle           insulin syringe 31G X 5/16\" 0.3 ML Misc           Pen Needles 31G X 8 MM Misc                Where to get your medicines      These medications were sent to [facility] - [place], [place] - [address]  [address], [facility] [address]    Hours:  TRANSPLANT PHONE NUMBER [phone] Phone:  [phone]     insulin aspart 100 UNITS/ML injection    lisinopril 5 MG tablet         These medications were sent to [facility] [employer] - [place], [place] - [address]  [address] [address]     Phone:  [phone]     blood glucose monitoring test strip    MICROLET LANCETS Misc         Some of these will need a paper prescription and others can be bought over the counter.  Ask your nurse if you have questions.     Bring a paper prescription for each of these medications     insulin glargine 100 UNIT/ML injection                Primary Care Provider Office Phone # Fax #    Neo Owens [phone]       [address] [facility]  [facility] [address]        Equal Access to Services     TRUDI VANN AH: Hadii aad ku hadasho Soomaali, waaxda luqadaha, qaybta kaalmada adeegyada, waxay idiin hayaan adeeg kharawen lamary ah. So [facility] [phone].    ATENCIÓN: Si habla español, tiene a altman disposición servicios gratuitos de asistencia lingüística. Llame al [phone].    We comply with applicable federal civil rights laws and [place] laws. We do not discriminate on the basis of race, color, national origin, age, disability, sex, sexual orientation, or gender identity.            Thank you!     Thank you for choosing [facility]  for your care. Our goal is always to provide you with excellent care. Hearing back from our patients is one way we can continue to improve our services. Please take a few minutes to complete the written survey that you may " receive in the mail after your visit with us. Thank you!             Your Updated Medication List - Protect others around you: Learn how to safely use, store and throw away your medicines at www.disposemymeds.org.          This list is accurate as of: 1/19/18 10:32 AM.  Always use your most recent med list.                   Brand Name Dispense Instructions for use Diagnosis    acetone (Urine) test Strp     50 strip    1 each by In Vitro route as needed.    Diabetes mellitus type 1, controlled (H)       blood glucose monitoring test strip    STAS CONTOUR    200 strip    Needs these test strips because this is the only meter that works with his pump.  Will need to test 6 times daily    Controlled diabetes mellitus type 1 without complications (H)       Blood Pressure Monitor Kit     1 kit    Take bp 2 times a week or PRN problems.  Dx DM    Diabetes mellitus type 1, controlled (H)       insulin aspart 100 UNITS/ML injection    NovoLOG VIAL    50 mL    Pt uses about 50 units per day in the pump    Controlled diabetes mellitus type 1 without complications (H)       insulin glargine 100 UNIT/ML injection    LANTUS    3 mL    Inject 16 Units Subcutaneous At Bedtime To provide basal insulin If pump fails    Controlled diabetes mellitus type 1 without complications (H)       lisinopril 5 MG tablet    PRINIVIL/ZESTRIL    270 tablet    Take 3 tablet (15 mg) daily    Controlled diabetes mellitus type 1 without complications (H)       MICROLET LANCETS Misc     200 each    1 each 6 times daily    Controlled diabetes mellitus type 1 without complications (H)

## 2024-12-26 ENCOUNTER — MYC REFILL (OUTPATIENT)
Dept: ENDOCRINOLOGY | Facility: CLINIC | Age: 44
End: 2024-12-26
Payer: COMMERCIAL

## 2024-12-26 DIAGNOSIS — E10.9 CONTROLLED DIABETES MELLITUS TYPE 1 WITHOUT COMPLICATIONS (H): ICD-10-CM

## 2024-12-26 RX ORDER — INSULIN INFUSION SET/CARTRIDGE
1 COMBINATION PACKAGE (EA) MISCELLANEOUS
Qty: 30 EACH | Refills: 3 | Status: SHIPPED | OUTPATIENT
Start: 2024-12-26

## 2024-12-26 RX ORDER — INSULIN INFUSION SET/CARTRIDGE
1 COMBINATION PACKAGE (EA) MISCELLANEOUS
Qty: 40 EACH | Refills: 1 | Status: SHIPPED | OUTPATIENT
Start: 2024-12-26 | End: 2024-12-26

## 2025-02-08 ENCOUNTER — HEALTH MAINTENANCE LETTER (OUTPATIENT)
Age: 45
End: 2025-02-08

## 2025-02-26 ENCOUNTER — TELEPHONE (OUTPATIENT)
Dept: ENDOCRINOLOGY | Facility: CLINIC | Age: 45
End: 2025-02-26
Payer: COMMERCIAL

## 2025-02-26 NOTE — TELEPHONE ENCOUNTER
Left Voicemail (1st Attempt) for the patient to call back and schedule the following:    Appointment type: RETURN ENDOCRINE  Provider: Bushra Rosa MD  Return date: Alley 7/18  Specialty phone number: [phone]  Additional appointment(s) needed:  Additonal Notes:  LVM, MyCx1 due to changes in the providers andrea this appt needs to be alley.  See options below to use to alley this appt.      Example:  6/6  Rosa   Virtual  in the hold slots  7/22  Rosa  Virtual  in the hold slots  7/23  Rosa  In Person  in the hold slots    Paras Neumann on 2/26/2025 at 10:17 AM

## 2025-03-26 DIAGNOSIS — E10.9 CONTROLLED DIABETES MELLITUS TYPE 1 WITHOUT COMPLICATIONS (H): ICD-10-CM

## 2025-03-26 RX ORDER — INSULIN PUMP CARTRIDGE
1 CARTRIDGE (EA) SUBCUTANEOUS
Qty: 45 EACH | Refills: 3 | Status: SHIPPED | OUTPATIENT
Start: 2025-03-26 | End: 2025-03-27

## 2025-03-27 ENCOUNTER — TELEPHONE (OUTPATIENT)
Dept: ENDOCRINOLOGY | Facility: CLINIC | Age: 45
End: 2025-03-27
Payer: COMMERCIAL

## 2025-03-27 DIAGNOSIS — E10.9 CONTROLLED DIABETES MELLITUS TYPE 1 WITHOUT COMPLICATIONS (H): ICD-10-CM

## 2025-03-27 RX ORDER — INSULIN INFUSION SET/CARTRIDGE
1 COMBINATION PACKAGE (EA) MISCELLANEOUS SEE ADMIN INSTRUCTIONS
Qty: 45 EACH | Refills: 3 | Status: SHIPPED | OUTPATIENT
Start: 2025-03-27

## 2025-03-27 RX ORDER — INSULIN PUMP CARTRIDGE
1 CARTRIDGE (EA) SUBCUTANEOUS SEE ADMIN INSTRUCTIONS
Qty: 45 EACH | Refills: 3 | Status: SHIPPED | OUTPATIENT
Start: 2025-03-27

## 2025-03-27 NOTE — TELEPHONE ENCOUNTER
Health Call Center    Phone Message    May a detailed message be left on voicemail: yes     Reason for Call: Medication Question or concern regarding medication   Prescription Clarification  Name of Medication: Insulin Infusion Pump Supplies (T:SLIM X2 3ML CARTRIDGE) MISC [[id number]] (Order [id number]   Prescribing Provider: Chow   Pharmacy:  [facility] - [place], [place] - [address]     What on the order needs clarification?   Pharmacy wondering instructions on how many times and what days patient is using this.      Action Taken: Message routed to:  [facility] endo    Travel Screening: Not Applicable     Date of Service:

## 2025-05-11 ENCOUNTER — HEALTH MAINTENANCE LETTER (OUTPATIENT)
Age: 45
End: 2025-05-11

## 2025-07-21 ENCOUNTER — LAB (OUTPATIENT)
Dept: LAB | Facility: CLINIC | Age: 45
End: 2025-07-21
Payer: COMMERCIAL

## 2025-07-21 DIAGNOSIS — E10.9 CONTROLLED DIABETES MELLITUS TYPE 1 WITHOUT COMPLICATIONS (H): ICD-10-CM

## 2025-07-21 LAB
ANION GAP SERPL CALCULATED.3IONS-SCNC: 13 MMOL/L (ref 7–15)
BUN SERPL-MCNC: 6.3 MG/DL (ref 6–20)
CALCIUM SERPL-MCNC: 9.8 MG/DL (ref 8.8–10.4)
CHLORIDE SERPL-SCNC: 102 MMOL/L (ref 98–107)
CHOLEST SERPL-MCNC: 141 MG/DL
CREAT SERPL-MCNC: 0.74 MG/DL (ref 0.67–1.17)
CREAT UR-MCNC: 25.5 MG/DL
EGFRCR SERPLBLD CKD-EPI 2021: >90 ML/MIN/1.73M2
EST. AVERAGE GLUCOSE BLD GHB EST-MCNC: 123 MG/DL
FASTING STATUS PATIENT QL REPORTED: YES
FASTING STATUS PATIENT QL REPORTED: YES
GLUCOSE SERPL-MCNC: 99 MG/DL (ref 70–99)
HBA1C MFR BLD: 5.9 %
HCO3 SERPL-SCNC: 27 MMOL/L (ref 22–29)
HDLC SERPL-MCNC: 76 MG/DL
LDLC SERPL CALC-MCNC: 58 MG/DL
MICROALBUMIN UR-MCNC: <12 MG/L
MICROALBUMIN/CREAT UR: NORMAL MG/G{CREAT}
NONHDLC SERPL-MCNC: 65 MG/DL
POTASSIUM SERPL-SCNC: 4.6 MMOL/L (ref 3.4–5.3)
SODIUM SERPL-SCNC: 142 MMOL/L (ref 135–145)
TRIGL SERPL-MCNC: 36 MG/DL
TSH SERPL DL<=0.005 MIU/L-ACNC: 0.96 UIU/ML (ref 0.3–4.2)

## 2025-07-21 PROCEDURE — 36415 COLL VENOUS BLD VENIPUNCTURE: CPT | Performed by: PATHOLOGY

## 2025-07-21 PROCEDURE — 86364 TISS TRNSGLTMNASE EA IG CLAS: CPT | Performed by: INTERNAL MEDICINE

## 2025-07-21 PROCEDURE — 84443 ASSAY THYROID STIM HORMONE: CPT | Performed by: PATHOLOGY

## 2025-07-21 PROCEDURE — 80048 BASIC METABOLIC PNL TOTAL CA: CPT | Performed by: PATHOLOGY

## 2025-07-21 PROCEDURE — 99000 SPECIMEN HANDLING OFFICE-LAB: CPT | Performed by: PATHOLOGY

## 2025-07-21 PROCEDURE — 82043 UR ALBUMIN QUANTITATIVE: CPT | Performed by: INTERNAL MEDICINE

## 2025-07-21 PROCEDURE — 83036 HEMOGLOBIN GLYCOSYLATED A1C: CPT | Performed by: INTERNAL MEDICINE

## 2025-07-21 PROCEDURE — 82306 VITAMIN D 25 HYDROXY: CPT | Performed by: INTERNAL MEDICINE

## 2025-07-21 PROCEDURE — 80061 LIPID PANEL: CPT | Performed by: PATHOLOGY

## 2025-07-22 LAB
TTG IGA SER-ACNC: <0.2 U/ML
TTG IGG SER-ACNC: <0.6 U/ML

## 2025-07-23 ENCOUNTER — OFFICE VISIT (OUTPATIENT)
Dept: ENDOCRINOLOGY | Facility: CLINIC | Age: 45
End: 2025-07-23
Payer: COMMERCIAL

## 2025-07-23 VITALS
BODY MASS INDEX: 23.29 KG/M2 | WEIGHT: 160 LBS | OXYGEN SATURATION: 98 % | DIASTOLIC BLOOD PRESSURE: 83 MMHG | HEART RATE: 54 BPM | SYSTOLIC BLOOD PRESSURE: 137 MMHG

## 2025-07-23 DIAGNOSIS — E10.9 CONTROLLED DIABETES MELLITUS TYPE 1 WITHOUT COMPLICATIONS (H): Primary | ICD-10-CM

## 2025-07-23 LAB
DEPRECATED CALCIDIOL+CALCIFEROL SERPL-MC: <44 UG/L (ref 20–75)
VITAMIN D2 SERPL-MCNC: <5 UG/L
VITAMIN D3 SERPL-MCNC: 39 UG/L

## 2025-07-23 RX ORDER — GLUCAGON 3 MG/1
1 POWDER NASAL PRN
Qty: 1 EACH | Refills: 1 | Status: SHIPPED | OUTPATIENT
Start: 2025-07-23

## 2025-07-23 RX ORDER — LISINOPRIL 5 MG/1
15 TABLET ORAL DAILY
Qty: 270 TABLET | Refills: 4 | Status: SHIPPED | OUTPATIENT
Start: 2025-07-23

## 2025-07-23 RX ORDER — ACYCLOVIR 400 MG/1
TABLET ORAL
Qty: 3 EACH | Refills: 11 | Status: SHIPPED | OUTPATIENT
Start: 2025-07-23

## 2025-07-23 RX ORDER — INSULIN ASPART 100 [IU]/ML
INJECTION, SOLUTION INTRAVENOUS; SUBCUTANEOUS
Qty: 50 ML | Refills: 3 | Status: SHIPPED | OUTPATIENT
Start: 2025-07-23

## 2025-07-23 ASSESSMENT — PAIN SCALES - GENERAL: PAINLEVEL_OUTOF10: NO PAIN (0)

## 2025-07-23 NOTE — PROGRESS NOTES
INPERSON VISIT    7/23/25      Chief Complaint:   Diabetes     History of Present Illness:   Joseph O Thoen is a 45  year old male with a history of type I diabetes mellitus who presents alone for follow-up of diabetes.    20 minutes spent on pt on the day of the encounter including documentation time, chart review, discussion with pt, and coordination of care     I spent a total of  20  minutes on the date of encounter reviewing medical records, evaluating the patient, coordinating care and documenting in the EHR, as detailed, exclusive of CGM    The longitudinal plan of care for the diagnosis(es)/condition(s) as documented were addressed during this visit. Due to the added complexity in care, I will continue to support the pt in the subsequent management and with ongoing continuity of care.      #1 Type I diabetes mellitus   The patient was initially seen in May 2011 at which time he had a 30 pound weight loss with polyuria, polydipsia, serum glucose of 466, and Hemoglobin A1c of 13.5%.  His C-peptide was 0.8 with a glucose of 318.  DEMARCO antibodies were negative.  He was initially started on Metformin however his blood sugars did not improve therefore he was switched to Lantus and Novolog.  Blood work in September 2011 was significant for negative anti-islet and anti-insulin antibodies.  Since starting insulin, his diabetes has been well controlled with Hemoglobin A1c values all <7%.  He started on a Medtronic insulin pump in July 2012 and switched to the closed-loop system in January 2018.  The patient did not like the Medtronic closed-loop system due to the alarms.  so he is on the Medtronic pump with the Dexcom G6 sensor (started in January 2021).  He reports that he is extremely happy with the Dexcom G6 sensor and feels that this has been useful.    October 2021 hemoglobin A1c of 5.9. The patient has been on the tandem t:slim pump with control IQ since roughly November 2021.  He is very satisfied with the  program.  July 2022 hemoglobin A1c of 5.6.  July 2022 labs show negative urine for protein, negative TTG, hemoglobin A1c of 5.6, vitamin D of 42, and TSH of 0.72.     Interval history: Patient continues on lisinopril 15 mg daily.  Feels well. Likes dexcom g7 sensor.  Reports that he is eligible for a pump upgrade in summer 2025 and interested in the Tandem Mobi or Omnipod.  July 2025 hemoglobin A1c of 5.9.  Has upcoming eye exam.    Blood Glucose Monitoring:  CGM reviewed: 81% in range, 12% above range, average glucose 132 estimated hemoglobin A1c is 6.5.  July 2025 hemoglobin A1c at 5.9.    Total daily insulin at 43 units.  This is 34% basal (14 units), and 66% bolus (29 units)    Pump settings as noted below:  Basal rate:  Midnight to 6 AM: 0.5 units/h  6 AM to 11 PM: 0.7 units/h  11 PM to midnight: 0.5 units/h    Correction factor:  Midnight to 6 AM: 1 per 45  6 AM to 10 AM: 1 per 35  10 AM to 11 pm: 1 per 40  11 PM to midnight: 1 per 40    Carb ratio:  Midnight to 6 AM: 1 per 12  6 AM to 11 PM: 1 per 9  9 PM to midnight: 1 per 12    Target glucose: 120    Diabetes monitoring and complications:  CAD: July 2024 LDL of 57  Last eye exam results: Negative in September 2023  Microalbuminuria: Negative in July 2024 on lisinopril  Neuropathy: No  HTN: Yes, on Lisinopril   On Statin: No  On Aspirin: No  Depression: No  Erectile dysfunction: No     #2 Low vitamin D  There is a history of low vitamin D.   July 2022 vitamin D was normal at 42. July 2023 vitamin D was 50. July 2024 vitamin D was 45    Interval history: July 2025 vitamin D 44.    Review of Systems:   Pertinent items are noted in HPI.  All other systems are negative.    Active Medications:   Current Outpatient Medications   Medication Sig Dispense Refill    acetone, Urine, test STRP 1 each by In Vitro route as needed. 50 strip 3    blood glucose (TOSHA CONTOUR) test strip strips work with meter / pump. Test 6 times daily  Tosha contour  NEXT strips 600 strip  3    Blood Pressure Monitor KIT Take bp 2 times a week or PRN problems.  Dx DM 1 kit 0    Continuous Glucose Sensor (DEXCOM G7 SENSOR) MISC Change every 10 days. 3 each 11    Glucagon (BAQSIMI TWO PACK) 3 MG/DOSE POWD Spray 1 Application in nostril as needed (as needed) Cost checking only 1 each 1    insulin aspart (NOVOLOG VIAL) 100 UNITS/ML vial Use in pump approx 50 units daily 50 mL 3    insulin glargine (LANTUS PEN) 100 UNIT/ML pen Pt to be on lantus 15 units daily if pump fails 3 mL 0    Insulin Infusion Pump Supplies (AUTOSOFT XC INFUSION SET) MISC 1 each See Admin Instructions. Change every 2-3 days for insulin administration via pump. Autosoft XC 9 mm cannula, 23 inch tubing. 45 each 3    Insulin Infusion Pump Supplies (T:SLIM X2 3ML CARTRIDGE) MISC 1 each by Other route See Admin Instructions. Change every 2- 3 days for insulin administration via pump. 45 each 3    lisinopril (ZESTRIL) 5 MG tablet Take 3 tablets (15 mg) by mouth daily Take 3 tablet (15 mg) daily. 270 tablet 4    MICROLET LANCETS MISC 1 each 6 times daily 200 each 11    NOVOLOG FLEXPEN 100 UNIT/ML soln Use for pump failure 50 units daily 15 mL 2    valACYclovir (VALTREX) 1000 mg tablet Take 1 tablet (1,000 mg) by mouth 3 times daily for 7 days 21 tablet 0     Allergies:   Benadryl [altaryl]      Past Medical History:  Type I diabetes mellitus   Hypertension      Past Surgical History:  History reviewed. No pertinent past surgical history.      Family History:   Thyroid disease - mother  Hypertension - mother      Social History:   The patient works at the [employer] [place].     Physical Exam:   Blood pressure 137/83, pulse 54, weight 72.6 kg (160 lb), SpO2 98%.     GENERAL APPEARANCE: Alert and no distress  NECK: No lymphadenopathy appreciated  Thyroid: No obvious nodules palpated   CV: RRR without M/R/G  Lungs: CTA bilaterally  Abdomen: Soft, Nontender, non distended, positive bowel sounds   Neuro: no focal deficits  Skin: No infection  in feet , intact to monofilament  Mood: Normal   Lymph: neg in neck and supraclavicular area        Data:  Lab on 07/21/2025   Component Date Value Ref Range Status    25 [place] Vitamin D2 07/21/2025 <5  ug/L Final    25 [place] Vitamin D3 07/21/2025 39  ug/L Final    25 [place] Vit D Total 07/21/2025 <44  20 - 75 ug/L Final    Season, race, dietary intake, and treatment affect the concentration of 25-hydroxy-Vitamin D. Values may decrease during winter months and increase during summer months. Values 20-29 ug/L may indicate Vitamin D insufficiency and values <20 ug/L may indicate Vitamin D deficiency.    TSH 07/21/2025 0.96  0.30 - 4.20 uIU/mL Final    Cholesterol 07/21/2025 141  <200 mg/dL Final    Triglycerides 07/21/2025 36  <150 mg/dL Final    Direct Measure HDL 07/21/2025 76  >=40 mg/dL Final    LDL Cholesterol Calculated 07/21/2025 58  <100 mg/dL Final    LDL calculated using the Friedewald equation.    Non HDL Cholesterol 07/21/2025 65  <130 mg/dL Final    Patient Fasting > 8hrs? 07/21/2025 Yes   Final    Creatinine Urine mg/dL 07/21/2025 25.5  mg/dL Final    The reference ranges have not been established in urine creatinine. The results should be integrated into the clinical context for interpretation.    Albumin Urine mg/L 07/21/2025 <12.0  mg/L Final    The reference ranges have not been established in urine albumin. The results should be integrated into the clinical context for interpretation.    Albumin Urine mg/g Cr 07/21/2025    Final    Unable to calculate, urine albumin and/or urine creatinine is outside detectable limits.  Microalbuminuria is defined as an albumin:creatinine ratio of 17 to 299 for males and 25 to 299 for females. A ratio of albumin:creatinine of 300 or higher is indicative of overt proteinuria.  Due to biologic variability, positive results should be confirmed by a second, first-morning random or 24-hour timed urine specimen. If there is discrepancy, a third specimen is recommended. When 2 out  of 3 results are in the microalbuminuria range, this is evidence for incipient nephropathy and warrants increased efforts at glucose control, blood pressure control, and institution of therapy with an angiotensin-converting-enzyme (ACE) inhibitor (if the patient can tolerate it).      Sodium 07/21/2025 142  135 - 145 mmol/L Final    Potassium 07/21/2025 4.6  3.4 - 5.3 mmol/L Final    Chloride 07/21/2025 102  98 - 107 mmol/L Final    Carbon Dioxide (CO2) 07/21/2025 27  22 - 29 mmol/L Final    Anion Gap 07/21/2025 13  7 - 15 mmol/L Final    Urea Nitrogen 07/21/2025 6.3  6.0 - 20.0 mg/dL Final    Creatinine 07/21/2025 0.74  0.67 - 1.17 mg/dL Final    GFR Estimate 07/21/2025 >90  >60 mL/min/1.73m2 Final    eGFR calculated using 2021 CKD-EPI equation.    Calcium 07/21/2025 9.8  8.8 - 10.4 mg/dL Final    Glucose 07/21/2025 99  70 - 99 mg/dL Final    Patient Fasting > 8hrs? 07/21/2025 Yes   Final    Estimated Average Glucose 07/21/2025 123 (H)  <117 mg/dL Final    Hemoglobin A1C 07/21/2025 5.9 (H)  <5.7 % Final    Normal <5.7%   Prediabetes 5.7-6.4%    Diabetes 6.5% or higher     Note: Adopted from ADA consensus guidelines.    Tissue Transglutaminase Antibody I* 07/21/2025 <0.2  <7.0 U/mL Final    Negative- The tTG-IgA assay has limited utility for patients with decreased levels of IgA. Screening for celiac disease should include IgA testing to rule out selective IgA deficiency and to guide selection and interpretation of serological testing. tTG-IgG testing may be positive in celiac disease patients with IgA deficiency.    Tissue Transglutaminase Antibody I* 07/21/2025 <0.6  <7.0 U/mL Final    Negative           Assessment and Plan:  #1 Type I diabetes mellitus   Discussed with patient on pump options.  I think he would likely benefit from the tandem mobi.  In the meantime, we will have the patient try sleep mode.  He will meet with our diabetes educator to consider the different pump options.    #2 hypertension  Continue  lisinopril 15 mg daily-prescription sent to pharmacy.    #3 history of low vitamin D  Now resolved    Return visit in 6 months.

## 2025-07-23 NOTE — NURSING NOTE
"Chief Complaint   Patient presents with    Diabetes     Vital signs:      BP: 137/83 Pulse: 54     SpO2: 98 %       Weight: 72.6 kg (160 lb)  Estimated body mass index is 23.29 kg/m  as calculated from the following:    Height as of 7/19/24: 1.765 m (5' 9.5\").    Weight as of this encounter: 72.6 kg (160 lb).        "

## 2025-07-23 NOTE — LETTER
7/23/2025       RE: Joseph O Thoen  [address] [facility] [address]     Dear Colleague,    Thank you for referring your patient, Joseph O Thoen, to the [facility] [place] at [facility]. Please see a copy of my visit note below.                  INPERSON VISIT    7/23/25      Chief Complaint:   Diabetes     History of Present Illness:   Joseph O Thoen is a 45  year old male with a history of type I diabetes mellitus who presents alone for follow-up of diabetes.    20 minutes spent on pt on the day of the encounter including documentation time, chart review, discussion with pt, and coordination of care     I spent a total of  20  minutes on the date of encounter reviewing medical records, evaluating the patient, coordinating care and documenting in the EHR, as detailed, exclusive of CGM    The longitudinal plan of care for the diagnosis(es)/condition(s) as documented were addressed during this visit. Due to the added complexity in care, I will continue to support the pt in the subsequent management and with ongoing continuity of care.      #1 Type I diabetes mellitus   The patient was initially seen in May 2011 at which time he had a 30 pound weight loss with polyuria, polydipsia, serum glucose of 466, and Hemoglobin A1c of 13.5%.  His C-peptide was 0.8 with a glucose of 318.  DEMARCO antibodies were negative.  He was initially started on Metformin however his blood sugars did not improve therefore he was switched to Lantus and Novolog.  Blood work in September 2011 was significant for negative anti-islet and anti-insulin antibodies.  Since starting insulin, his diabetes has been well controlled with Hemoglobin A1c values all <7%.  He started on a Medtronic insulin pump in July 2012 and switched to the closed-loop system in January 2018.  The patient did not like the Medtronic closed-loop system due to the alarms.  so he is on the  Medtronic pump with the Dexcom G6 sensor (started in January 2021).  He reports that he is extremely happy with the Dexcom G6 sensor and feels that this has been useful.    October 2021 hemoglobin A1c of 5.9. The patient has been on the tandem t:slim pump with control IQ since roughly November 2021.  He is very satisfied with the program.  July 2022 hemoglobin A1c of 5.6.  July 2022 labs show negative urine for protein, negative TTG, hemoglobin A1c of 5.6, vitamin D of 42, and TSH of 0.72.     Interval history: Patient continues on lisinopril 15 mg daily.  Feels well. Likes dexcom g7 sensor.  Reports that he is eligible for a pump upgrade in summer 2025 and interested in the Tandem Mobi or Omnipod.  July 2025 hemoglobin A1c of 5.9.  Has upcoming eye exam.    Blood Glucose Monitoring:  CGM reviewed: 81% in range, 12% above range, average glucose 132 estimated hemoglobin A1c is 6.5.  July 2025 hemoglobin A1c at 5.9.    Total daily insulin at 43 units.  This is 34% basal (14 units), and 66% bolus (29 units)    Pump settings as noted below:  Basal rate:  Midnight to 6 AM: 0.5 units/h  6 AM to 11 PM: 0.7 units/h  11 PM to midnight: 0.5 units/h    Correction factor:  Midnight to 6 AM: 1 per 45  6 AM to 10 AM: 1 per 35  10 AM to 11 pm: 1 per 40  11 PM to midnight: 1 per 40    Carb ratio:  Midnight to 6 AM: 1 per 12  6 AM to 11 PM: 1 per 9  9 PM to midnight: 1 per 12    Target glucose: 120    Diabetes monitoring and complications:  CAD: July 2024 LDL of 57  Last eye exam results: Negative in September 2023  Microalbuminuria: Negative in July 2024 on lisinopril  Neuropathy: No  HTN: Yes, on Lisinopril   On Statin: No  On Aspirin: No  Depression: No  Erectile dysfunction: No     #2 Low vitamin D  There is a history of low vitamin D.   July 2022 vitamin D was normal at 42. July 2023 vitamin D was 50. July 2024 vitamin D was 45    Interval history: July 2025 vitamin D 44.    Review of Systems:   Pertinent items are noted in  HPI.  All other systems are negative.    Active Medications:   Current Outpatient Medications   Medication Sig Dispense Refill     acetone, Urine, test STRP 1 each by In Vitro route as needed. 50 strip 3     blood glucose (TOSHA CONTOUR) test strip strips work with meter / pump. Test 6 times daily  Tosha contour  NEXT strips 600 strip 3     Blood Pressure Monitor KIT Take bp 2 times a week or PRN problems.  Dx DM 1 kit 0     Continuous Glucose Sensor (DEXCOM G7 SENSOR) MISC Change every 10 days. 3 each 11     Glucagon (BAQSIMI TWO PACK) 3 MG/DOSE POWD Spray 1 Application in nostril as needed (as needed) Cost checking only 1 each 1     insulin aspart (NOVOLOG VIAL) 100 UNITS/ML vial Use in pump approx 50 units daily 50 mL 3     insulin glargine (LANTUS PEN) 100 UNIT/ML pen Pt to be on lantus 15 units daily if pump fails 3 mL 0     Insulin Infusion Pump Supplies (AUTOSOFT XC INFUSION SET) MISC 1 each See Admin Instructions. Change every 2-3 days for insulin administration via pump. Autosoft XC 9 mm cannula, 23 inch tubing. 45 each 3     Insulin Infusion Pump Supplies (T:SLIM X2 3ML CARTRIDGE) MISC 1 each by Other route See Admin Instructions. Change every 2- 3 days for insulin administration via pump. 45 each 3     lisinopril (ZESTRIL) 5 MG tablet Take 3 tablets (15 mg) by mouth daily Take 3 tablet (15 mg) daily. 270 tablet 4     MICROLET LANCETS MISC 1 each 6 times daily 200 each 11     NOVOLOG FLEXPEN 100 UNIT/ML soln Use for pump failure 50 units daily 15 mL 2     valACYclovir (VALTREX) 1000 mg tablet Take 1 tablet (1,000 mg) by mouth 3 times daily for 7 days 21 tablet 0     Allergies:   Benadryl [altaryl]      Past Medical History:  Type I diabetes mellitus   Hypertension      Past Surgical History:  History reviewed. No pertinent past surgical history.      Family History:   Thyroid disease - mother  Hypertension - mother      Social History:   The patient works at the [employer].     Physical Exam:    Blood pressure 137/83, pulse 54, weight 72.6 kg (160 lb), SpO2 98%.     GENERAL APPEARANCE: Alert and no distress  NECK: No lymphadenopathy appreciated  Thyroid: No obvious nodules palpated   CV: RRR without M/R/G  Lungs: CTA bilaterally  Abdomen: Soft, Nontender, non distended, positive bowel sounds   Neuro: no focal deficits  Skin: No infection in feet , intact to monofilament  Mood: Normal   Lymph: neg in neck and supraclavicular area        Data:  Lab on 07/21/2025   Component Date Value Ref Range Status     25 OH Vitamin D2 07/21/2025 <5  ug/L Final     25 OH Vitamin D3 07/21/2025 39  ug/L Final     25 OH Vit D Total 07/21/2025 <44  20 - 75 ug/L Final    Season, race, dietary intake, and treatment affect the concentration of 25-hydroxy-Vitamin D. Values may decrease during winter months and increase during summer months. Values 20-29 ug/L may indicate Vitamin D insufficiency and values <20 ug/L may indicate Vitamin D deficiency.     TSH 07/21/2025 0.96  0.30 - 4.20 uIU/mL Final     Cholesterol 07/21/2025 141  <200 mg/dL Final     Triglycerides 07/21/2025 36  <150 mg/dL Final     Direct Measure HDL 07/21/2025 76  >=40 mg/dL Final     LDL Cholesterol Calculated 07/21/2025 58  <100 mg/dL Final    LDL calculated using the Friedewald equation.     Non HDL Cholesterol 07/21/2025 65  <130 mg/dL Final     Patient Fasting > 8hrs? 07/21/2025 Yes   Final     Creatinine Urine mg/dL 07/21/2025 25.5  mg/dL Final    The reference ranges have not been established in urine creatinine. The results should be integrated into the clinical context for interpretation.     Albumin Urine mg/L 07/21/2025 <12.0  mg/L Final    The reference ranges have not been established in urine albumin. The results should be integrated into the clinical context for interpretation.     Albumin Urine mg/g Cr 07/21/2025    Final    Unable to calculate, urine albumin and/or urine creatinine is outside detectable limits.  Microalbuminuria is defined as  an albumin:creatinine ratio of 17 to 299 for males and 25 to 299 for females. A ratio of albumin:creatinine of 300 or higher is indicative of overt proteinuria.  Due to biologic variability, positive results should be confirmed by a second, first-morning random or 24-hour timed urine specimen. If there is discrepancy, a third specimen is recommended. When 2 out of 3 results are in the microalbuminuria range, this is evidence for incipient nephropathy and warrants increased efforts at glucose control, blood pressure control, and institution of therapy with an angiotensin-converting-enzyme (ACE) inhibitor (if the patient can tolerate it).       Sodium 07/21/2025 142  135 - 145 mmol/L Final     Potassium 07/21/2025 4.6  3.4 - 5.3 mmol/L Final     Chloride 07/21/2025 102  98 - 107 mmol/L Final     Carbon Dioxide (CO2) 07/21/2025 27  22 - 29 mmol/L Final     Anion Gap 07/21/2025 13  7 - 15 mmol/L Final     Urea Nitrogen 07/21/2025 6.3  6.0 - 20.0 mg/dL Final     Creatinine 07/21/2025 0.74  0.67 - 1.17 mg/dL Final     GFR Estimate 07/21/2025 >90  >60 mL/min/1.73m2 Final    eGFR calculated using 2021 CKD-EPI equation.     Calcium 07/21/2025 9.8  8.8 - 10.4 mg/dL Final     Glucose 07/21/2025 99  70 - 99 mg/dL Final     Patient Fasting > 8hrs? 07/21/2025 Yes   Final     Estimated Average Glucose 07/21/2025 123 (H)  <117 mg/dL Final     Hemoglobin A1C 07/21/2025 5.9 (H)  <5.7 % Final    Normal <5.7%   Prediabetes 5.7-6.4%    Diabetes 6.5% or higher     Note: Adopted from ADA consensus guidelines.     Tissue Transglutaminase Antibody I* 07/21/2025 <0.2  <7.0 U/mL Final    Negative- The tTG-IgA assay has limited utility for patients with decreased levels of IgA. Screening for celiac disease should include IgA testing to rule out selective IgA deficiency and to guide selection and interpretation of serological testing. tTG-IgG testing may be positive in celiac disease patients with IgA deficiency.     Tissue Transglutaminase  Antibody I* 07/21/2025 <0.6  <7.0 U/mL Final    Negative           Assessment and Plan:  #1 Type I diabetes mellitus   Discussed with patient on pump options.  I think he would likely benefit from the tandem mobi.  In the meantime, we will have the patient try sleep mode.  He will meet with our diabetes educator to consider the different pump options.    #2 hypertension  Continue lisinopril 15 mg daily-prescription sent to pharmacy.    #3 history of low vitamin D  Now resolved    Return visit in 6 months.    Again, thank you for allowing me to participate in the care of your patient.      Sincerely,    Bushra Rosa MD

## 2025-07-24 ENCOUNTER — PATIENT OUTREACH (OUTPATIENT)
Dept: CARE COORDINATION | Facility: CLINIC | Age: 45
End: 2025-07-24
Payer: COMMERCIAL

## 2025-07-28 ENCOUNTER — PATIENT OUTREACH (OUTPATIENT)
Dept: CARE COORDINATION | Facility: CLINIC | Age: 45
End: 2025-07-28
Payer: COMMERCIAL

## 2025-08-28 ENCOUNTER — OFFICE VISIT (OUTPATIENT)
Dept: OPHTHALMOLOGY | Facility: CLINIC | Age: 45
End: 2025-08-28
Payer: COMMERCIAL

## 2025-08-28 DIAGNOSIS — H52.4 MYOPIA WITH PRESBYOPIA OF BOTH EYES: ICD-10-CM

## 2025-08-28 DIAGNOSIS — H52.13 MYOPIA WITH PRESBYOPIA OF BOTH EYES: ICD-10-CM

## 2025-08-28 DIAGNOSIS — E10.9 TYPE 1 DIABETES MELLITUS WITHOUT COMPLICATION (H): Primary | ICD-10-CM

## 2025-08-28 ASSESSMENT — VISUAL ACUITY
OS_CC: 20/20
OS_CC+: -2
METHOD: SNELLEN - LINEAR
OD_CC: 20/20
CORRECTION_TYPE: GLASSES
OD_CC+: -1

## 2025-08-28 ASSESSMENT — TONOMETRY
OS_IOP_MMHG: 14
OD_IOP_MMHG: 13
IOP_METHOD: TONOPEN

## 2025-08-28 ASSESSMENT — REFRACTION_MANIFEST
OD_CYLINDER: SPHERE
OD_SPHERE: -1.25
OS_SPHERE: -1.00
OS_CYLINDER: SPHERE
OD_ADD: +1.25
OS_ADD: +1.25

## 2025-08-28 ASSESSMENT — CUP TO DISC RATIO
OS_RATIO: 0.25
OD_RATIO: 0.25

## 2025-08-28 ASSESSMENT — REFRACTION_WEARINGRX
OS_SPHERE: -0.75
OS_CYLINDER: SPHERE
OD_CYLINDER: SPHERE
OD_SPHERE: -0.75

## 2025-08-28 ASSESSMENT — SLIT LAMP EXAM - LIDS
COMMENTS: TR BLEPH
COMMENTS: TR BLEPH

## 2025-08-28 ASSESSMENT — CONF VISUAL FIELD
OS_SUPERIOR_TEMPORAL_RESTRICTION: 0
OD_INFERIOR_TEMPORAL_RESTRICTION: 0
OS_INFERIOR_TEMPORAL_RESTRICTION: 0
OD_NORMAL: 1
METHOD: COUNTING FINGERS
OS_NORMAL: 1
OS_SUPERIOR_NASAL_RESTRICTION: 0
OD_SUPERIOR_NASAL_RESTRICTION: 0
OS_INFERIOR_NASAL_RESTRICTION: 0
OD_INFERIOR_NASAL_RESTRICTION: 0
OD_SUPERIOR_TEMPORAL_RESTRICTION: 0

## 2025-08-28 ASSESSMENT — EXTERNAL EXAM - RIGHT EYE: OD_EXAM: NORMAL

## 2025-08-28 ASSESSMENT — EXTERNAL EXAM - LEFT EYE: OS_EXAM: NORMAL

## (undated) RX ORDER — SIMETHICONE 40MG/0.6ML
SUSPENSION, DROPS(FINAL DOSAGE FORM)(ML) ORAL
Status: DISPENSED
Start: 2024-09-04

## (undated) RX ORDER — FENTANYL CITRATE 50 UG/ML
INJECTION, SOLUTION INTRAMUSCULAR; INTRAVENOUS
Status: DISPENSED
Start: 2024-09-04

## (undated) RX ORDER — ERYTHROMYCIN 5 MG/G
OINTMENT OPHTHALMIC
Status: DISPENSED
Start: 2019-04-08